# Patient Record
Sex: MALE | Race: OTHER | HISPANIC OR LATINO | URBAN - METROPOLITAN AREA
[De-identification: names, ages, dates, MRNs, and addresses within clinical notes are randomized per-mention and may not be internally consistent; named-entity substitution may affect disease eponyms.]

---

## 2019-01-01 ENCOUNTER — INPATIENT (INPATIENT)
Facility: HOSPITAL | Age: 69
LOS: 13 days | DRG: 870 | End: 2019-06-27
Attending: INTERNAL MEDICINE | Admitting: INTERNAL MEDICINE
Payer: MEDICARE

## 2019-01-01 VITALS
OXYGEN SATURATION: 100 % | DIASTOLIC BLOOD PRESSURE: 60 MMHG | HEART RATE: 72 BPM | TEMPERATURE: 98 F | SYSTOLIC BLOOD PRESSURE: 90 MMHG | RESPIRATION RATE: 22 BRPM | WEIGHT: 220.02 LBS

## 2019-01-01 VITALS — OXYGEN SATURATION: 73 %

## 2019-01-01 DIAGNOSIS — J91.8 PLEURAL EFFUSION IN OTHER CONDITIONS CLASSIFIED ELSEWHERE: ICD-10-CM

## 2019-01-01 DIAGNOSIS — Z78.1 PHYSICAL RESTRAINT STATUS: ICD-10-CM

## 2019-01-01 DIAGNOSIS — E66.01 MORBID (SEVERE) OBESITY DUE TO EXCESS CALORIES: ICD-10-CM

## 2019-01-01 DIAGNOSIS — N12 TUBULO-INTERSTITIAL NEPHRITIS, NOT SPECIFIED AS ACUTE OR CHRONIC: ICD-10-CM

## 2019-01-01 DIAGNOSIS — E87.1 HYPO-OSMOLALITY AND HYPONATREMIA: ICD-10-CM

## 2019-01-01 DIAGNOSIS — Z51.5 ENCOUNTER FOR PALLIATIVE CARE: ICD-10-CM

## 2019-01-01 DIAGNOSIS — Z93.6 OTHER ARTIFICIAL OPENINGS OF URINARY TRACT STATUS: ICD-10-CM

## 2019-01-01 DIAGNOSIS — I12.9 HYPERTENSIVE CHRONIC KIDNEY DISEASE WITH STAGE 1 THROUGH STAGE 4 CHRONIC KIDNEY DISEASE, OR UNSPECIFIED CHRONIC KIDNEY DISEASE: ICD-10-CM

## 2019-01-01 DIAGNOSIS — C34.31 MALIGNANT NEOPLASM OF LOWER LOBE, RIGHT BRONCHUS OR LUNG: ICD-10-CM

## 2019-01-01 DIAGNOSIS — J18.9 PNEUMONIA, UNSPECIFIED ORGANISM: ICD-10-CM

## 2019-01-01 DIAGNOSIS — N13.6 PYONEPHROSIS: ICD-10-CM

## 2019-01-01 DIAGNOSIS — F10.11 ALCOHOL ABUSE, IN REMISSION: ICD-10-CM

## 2019-01-01 DIAGNOSIS — C79.70 SECONDARY MALIGNANT NEOPLASM OF UNSPECIFIED ADRENAL GLAND: ICD-10-CM

## 2019-01-01 DIAGNOSIS — Z85.46 PERSONAL HISTORY OF MALIGNANT NEOPLASM OF PROSTATE: ICD-10-CM

## 2019-01-01 DIAGNOSIS — G89.29 OTHER CHRONIC PAIN: ICD-10-CM

## 2019-01-01 DIAGNOSIS — N19 UNSPECIFIED KIDNEY FAILURE: ICD-10-CM

## 2019-01-01 DIAGNOSIS — N18.4 CHRONIC KIDNEY DISEASE, STAGE 4 (SEVERE): ICD-10-CM

## 2019-01-01 DIAGNOSIS — I95.9 HYPOTENSION, UNSPECIFIED: ICD-10-CM

## 2019-01-01 DIAGNOSIS — R65.21 SEVERE SEPSIS WITH SEPTIC SHOCK: ICD-10-CM

## 2019-01-01 DIAGNOSIS — Z87.828 PERSONAL HISTORY OF OTHER (HEALED) PHYSICAL INJURY AND TRAUMA: ICD-10-CM

## 2019-01-01 DIAGNOSIS — N48.29 OTHER INFLAMMATORY DISORDERS OF PENIS: ICD-10-CM

## 2019-01-01 DIAGNOSIS — J43.9 EMPHYSEMA, UNSPECIFIED: ICD-10-CM

## 2019-01-01 DIAGNOSIS — N26.9 RENAL SCLEROSIS, UNSPECIFIED: ICD-10-CM

## 2019-01-01 DIAGNOSIS — G92 TOXIC ENCEPHALOPATHY: ICD-10-CM

## 2019-01-01 DIAGNOSIS — G47.33 OBSTRUCTIVE SLEEP APNEA (ADULT) (PEDIATRIC): ICD-10-CM

## 2019-01-01 DIAGNOSIS — L03.311 CELLULITIS OF ABDOMINAL WALL: ICD-10-CM

## 2019-01-01 DIAGNOSIS — E86.0 DEHYDRATION: ICD-10-CM

## 2019-01-01 DIAGNOSIS — C78.7 SECONDARY MALIGNANT NEOPLASM OF LIVER AND INTRAHEPATIC BILE DUCT: ICD-10-CM

## 2019-01-01 DIAGNOSIS — K80.10 CALCULUS OF GALLBLADDER WITH CHRONIC CHOLECYSTITIS WITHOUT OBSTRUCTION: ICD-10-CM

## 2019-01-01 DIAGNOSIS — N25.0 RENAL OSTEODYSTROPHY: ICD-10-CM

## 2019-01-01 DIAGNOSIS — Z71.89 OTHER SPECIFIED COUNSELING: ICD-10-CM

## 2019-01-01 DIAGNOSIS — E11.22 TYPE 2 DIABETES MELLITUS WITH DIABETIC CHRONIC KIDNEY DISEASE: ICD-10-CM

## 2019-01-01 DIAGNOSIS — Z90.6 ACQUIRED ABSENCE OF OTHER PARTS OF URINARY TRACT: ICD-10-CM

## 2019-01-01 DIAGNOSIS — E87.0 HYPEROSMOLALITY AND HYPERNATREMIA: ICD-10-CM

## 2019-01-01 DIAGNOSIS — N17.0 ACUTE KIDNEY FAILURE WITH TUBULAR NECROSIS: ICD-10-CM

## 2019-01-01 DIAGNOSIS — E87.5 HYPERKALEMIA: ICD-10-CM

## 2019-01-01 DIAGNOSIS — Z90.5 ACQUIRED ABSENCE OF KIDNEY: ICD-10-CM

## 2019-01-01 DIAGNOSIS — E87.4 MIXED DISORDER OF ACID-BASE BALANCE: ICD-10-CM

## 2019-01-01 DIAGNOSIS — B95.2 ENTEROCOCCUS AS THE CAUSE OF DISEASES CLASSIFIED ELSEWHERE: ICD-10-CM

## 2019-01-01 DIAGNOSIS — E11.65 TYPE 2 DIABETES MELLITUS WITH HYPERGLYCEMIA: ICD-10-CM

## 2019-01-01 DIAGNOSIS — K59.00 CONSTIPATION, UNSPECIFIED: ICD-10-CM

## 2019-01-01 DIAGNOSIS — Z93.3 COLOSTOMY STATUS: ICD-10-CM

## 2019-01-01 DIAGNOSIS — D63.1 ANEMIA IN CHRONIC KIDNEY DISEASE: ICD-10-CM

## 2019-01-01 DIAGNOSIS — F41.9 ANXIETY DISORDER, UNSPECIFIED: ICD-10-CM

## 2019-01-01 DIAGNOSIS — M54.9 DORSALGIA, UNSPECIFIED: ICD-10-CM

## 2019-01-01 DIAGNOSIS — K43.5 PARASTOMAL HERNIA WITHOUT OBSTRUCTION OR GANGRENE: ICD-10-CM

## 2019-01-01 DIAGNOSIS — Z16.11 RESISTANCE TO PENICILLINS: ICD-10-CM

## 2019-01-01 DIAGNOSIS — A41.9 SEPSIS, UNSPECIFIED ORGANISM: ICD-10-CM

## 2019-01-01 DIAGNOSIS — J96.01 ACUTE RESPIRATORY FAILURE WITH HYPOXIA: ICD-10-CM

## 2019-01-01 DIAGNOSIS — Z93.3 COLOSTOMY STATUS: Chronic | ICD-10-CM

## 2019-01-01 DIAGNOSIS — R41.3 OTHER AMNESIA: ICD-10-CM

## 2019-01-01 DIAGNOSIS — Z90.6 ACQUIRED ABSENCE OF OTHER PARTS OF URINARY TRACT: Chronic | ICD-10-CM

## 2019-01-01 DIAGNOSIS — Z66 DO NOT RESUSCITATE: ICD-10-CM

## 2019-01-01 DIAGNOSIS — F17.210 NICOTINE DEPENDENCE, CIGARETTES, UNCOMPLICATED: ICD-10-CM

## 2019-01-01 DIAGNOSIS — T17.990A OTHER FOREIGN OBJECT IN RESPIRATORY TRACT, PART UNSPECIFIED IN CAUSING ASPHYXIATION, INITIAL ENCOUNTER: ICD-10-CM

## 2019-01-01 DIAGNOSIS — I71.9 AORTIC ANEURYSM OF UNSPECIFIED SITE, WITHOUT RUPTURE: ICD-10-CM

## 2019-01-01 DIAGNOSIS — F11.90 OPIOID USE, UNSPECIFIED, UNCOMPLICATED: ICD-10-CM
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
## 2019-01-01 DIAGNOSIS — Z79.84 LONG TERM (CURRENT) USE OF ORAL HYPOGLYCEMIC DRUGS: ICD-10-CM

## 2019-01-01 DIAGNOSIS — E87.70 FLUID OVERLOAD, UNSPECIFIED: ICD-10-CM

## 2019-01-01 DIAGNOSIS — I48.91 UNSPECIFIED ATRIAL FIBRILLATION: ICD-10-CM

## 2019-01-01 LAB
-  AMPICILLIN: SIGNIFICANT CHANGE UP
-  VANCOMYCIN: SIGNIFICANT CHANGE UP
24R-OH-CALCIDIOL SERPL-MCNC: 25.7 NG/ML — LOW (ref 30–80)
ALBUMIN SERPL ELPH-MCNC: 2 G/DL — LOW (ref 3.3–5)
ALBUMIN SERPL ELPH-MCNC: 2.1 G/DL — LOW (ref 3.3–5)
ALBUMIN SERPL ELPH-MCNC: 2.2 G/DL — LOW (ref 3.3–5)
ALBUMIN SERPL ELPH-MCNC: 2.2 G/DL — LOW (ref 3.3–5)
ALBUMIN SERPL ELPH-MCNC: 2.4 G/DL — LOW (ref 3.4–5)
ALBUMIN SERPL ELPH-MCNC: 2.5 G/DL — LOW (ref 3.3–5)
ALBUMIN SERPL ELPH-MCNC: 2.5 G/DL — LOW (ref 3.4–5)
ALBUMIN SERPL ELPH-MCNC: 2.8 G/DL — LOW (ref 3.3–5)
ALBUMIN SERPL ELPH-MCNC: 2.8 G/DL — LOW (ref 3.3–5)
ALBUMIN SERPL ELPH-MCNC: 3 G/DL — LOW (ref 3.3–5)
ALP SERPL-CCNC: 113 U/L — SIGNIFICANT CHANGE UP (ref 40–120)
ALP SERPL-CCNC: 115 U/L — SIGNIFICANT CHANGE UP (ref 40–120)
ALP SERPL-CCNC: 115 U/L — SIGNIFICANT CHANGE UP (ref 40–120)
ALP SERPL-CCNC: 118 U/L — SIGNIFICANT CHANGE UP (ref 40–120)
ALP SERPL-CCNC: 133 U/L — HIGH (ref 40–120)
ALP SERPL-CCNC: 137 U/L — HIGH (ref 40–120)
ALP SERPL-CCNC: 138 U/L — HIGH (ref 40–120)
ALP SERPL-CCNC: 141 U/L — HIGH (ref 40–120)
ALP SERPL-CCNC: 144 U/L — HIGH (ref 40–120)
ALP SERPL-CCNC: 148 U/L — HIGH (ref 40–120)
ALP SERPL-CCNC: 150 U/L — HIGH (ref 40–120)
ALP SERPL-CCNC: 91 U/L — SIGNIFICANT CHANGE UP (ref 40–120)
ALT FLD-CCNC: 11 U/L — SIGNIFICANT CHANGE UP (ref 10–45)
ALT FLD-CCNC: 12 U/L — SIGNIFICANT CHANGE UP (ref 12–42)
ALT FLD-CCNC: 13 U/L — SIGNIFICANT CHANGE UP (ref 10–45)
ALT FLD-CCNC: 14 U/L — SIGNIFICANT CHANGE UP (ref 10–45)
ALT FLD-CCNC: 14 U/L — SIGNIFICANT CHANGE UP (ref 10–45)
ALT FLD-CCNC: 16 U/L — SIGNIFICANT CHANGE UP (ref 10–45)
ALT FLD-CCNC: 17 U/L — SIGNIFICANT CHANGE UP (ref 10–45)
ALT FLD-CCNC: 6 U/L — LOW (ref 10–45)
ALT FLD-CCNC: 7 U/L — LOW (ref 10–45)
ALT FLD-CCNC: 8 U/L — LOW (ref 10–45)
ALT FLD-CCNC: 8 U/L — LOW (ref 10–45)
ALT FLD-CCNC: 9 U/L — LOW (ref 12–42)
AMIODARONE FLD-MCNC: <0.2 MCG/ML — LOW
AMIODARONE+DESETH SERPL-MCNC: <0.2 MCG/ML — SIGNIFICANT CHANGE UP
AMMONIA BLD-MCNC: 28 UMOL/L — SIGNIFICANT CHANGE UP (ref 11–32)
ANION GAP SERPL CALC-SCNC: 12 MMOL/L — SIGNIFICANT CHANGE UP (ref 5–17)
ANION GAP SERPL CALC-SCNC: 13 MMOL/L — SIGNIFICANT CHANGE UP (ref 5–17)
ANION GAP SERPL CALC-SCNC: 13 MMOL/L — SIGNIFICANT CHANGE UP (ref 5–17)
ANION GAP SERPL CALC-SCNC: 14 MMOL/L — SIGNIFICANT CHANGE UP (ref 5–17)
ANION GAP SERPL CALC-SCNC: 15 MMOL/L — SIGNIFICANT CHANGE UP (ref 5–17)
ANION GAP SERPL CALC-SCNC: 16 MMOL/L — SIGNIFICANT CHANGE UP (ref 5–17)
ANION GAP SERPL CALC-SCNC: 16 MMOL/L — SIGNIFICANT CHANGE UP (ref 5–17)
ANION GAP SERPL CALC-SCNC: 17 MMOL/L — SIGNIFICANT CHANGE UP (ref 5–17)
ANION GAP SERPL CALC-SCNC: 18 MMOL/L — HIGH (ref 5–17)
ANION GAP SERPL CALC-SCNC: 20 MMOL/L — HIGH (ref 5–17)
ANION GAP SERPL CALC-SCNC: 20 MMOL/L — HIGH (ref 9–16)
ANION GAP SERPL CALC-SCNC: 20 MMOL/L — HIGH (ref 9–16)
ANION GAP SERPL CALC-SCNC: 23 MMOL/L — HIGH (ref 5–17)
ANISOCYTOSIS BLD QL: SLIGHT — SIGNIFICANT CHANGE UP
APPEARANCE UR: ABNORMAL
APPEARANCE UR: CLEAR — SIGNIFICANT CHANGE UP
APPEARANCE UR: CLEAR — SIGNIFICANT CHANGE UP
APTT BLD: 29.1 SEC — SIGNIFICANT CHANGE UP (ref 27.5–36.3)
APTT BLD: 30 SEC — SIGNIFICANT CHANGE UP (ref 27.5–36.3)
APTT BLD: 31.2 SEC — SIGNIFICANT CHANGE UP (ref 27.5–36.3)
APTT BLD: 35.3 SEC — SIGNIFICANT CHANGE UP (ref 27.5–36.3)
AST SERPL-CCNC: 13 U/L — SIGNIFICANT CHANGE UP (ref 10–40)
AST SERPL-CCNC: 13 U/L — SIGNIFICANT CHANGE UP (ref 10–40)
AST SERPL-CCNC: 14 U/L — LOW (ref 15–37)
AST SERPL-CCNC: 15 U/L — SIGNIFICANT CHANGE UP (ref 10–40)
AST SERPL-CCNC: 16 U/L — SIGNIFICANT CHANGE UP (ref 15–37)
AST SERPL-CCNC: 24 U/L — SIGNIFICANT CHANGE UP (ref 10–40)
AST SERPL-CCNC: 33 U/L — SIGNIFICANT CHANGE UP (ref 10–40)
AST SERPL-CCNC: 35 U/L — SIGNIFICANT CHANGE UP (ref 10–40)
AST SERPL-CCNC: 35 U/L — SIGNIFICANT CHANGE UP (ref 10–40)
AST SERPL-CCNC: 40 U/L — SIGNIFICANT CHANGE UP (ref 10–40)
AST SERPL-CCNC: 43 U/L — HIGH (ref 10–40)
AST SERPL-CCNC: 9 U/L — LOW (ref 10–40)
B PERT IGG+IGM PNL SER: SIGNIFICANT CHANGE UP
BASE EXCESS BLDA CALC-SCNC: -10.5 MMOL/L — LOW (ref -2–3)
BASE EXCESS BLDA CALC-SCNC: -9.2 MMOL/L — LOW (ref -2–3)
BASE EXCESS BLDA CALC-SCNC: -9.9 MMOL/L — LOW (ref -2–3)
BASOPHILS # BLD AUTO: 0 K/UL — SIGNIFICANT CHANGE UP (ref 0–0.2)
BASOPHILS # BLD AUTO: 0.3 K/UL — HIGH (ref 0–0.2)
BASOPHILS NFR BLD AUTO: 0 % — SIGNIFICANT CHANGE UP (ref 0–2)
BASOPHILS NFR BLD AUTO: 0.1 % — SIGNIFICANT CHANGE UP (ref 0–2)
BASOPHILS NFR BLD AUTO: 1 % — SIGNIFICANT CHANGE UP (ref 0–2)
BILIRUB DIRECT SERPL-MCNC: <0.2 MG/DL — SIGNIFICANT CHANGE UP (ref 0–0.2)
BILIRUB INDIRECT FLD-MCNC: >0.1 MG/DL — LOW (ref 0.2–1)
BILIRUB INDIRECT FLD-MCNC: >0.1 MG/DL — LOW (ref 0.2–1)
BILIRUB INDIRECT FLD-MCNC: >0.2 MG/DL — SIGNIFICANT CHANGE UP (ref 0.2–1)
BILIRUB SERPL-MCNC: 0.3 MG/DL — SIGNIFICANT CHANGE UP (ref 0.2–1.2)
BILIRUB SERPL-MCNC: 0.4 MG/DL — SIGNIFICANT CHANGE UP (ref 0.2–1.2)
BILIRUB UR-MCNC: ABNORMAL
BILIRUB UR-MCNC: NEGATIVE — SIGNIFICANT CHANGE UP
BILIRUB UR-MCNC: NEGATIVE — SIGNIFICANT CHANGE UP
BLD GP AB SCN SERPL QL: NEGATIVE — SIGNIFICANT CHANGE UP
BUN SERPL-MCNC: 38 MG/DL — HIGH (ref 7–23)
BUN SERPL-MCNC: 39 MG/DL — HIGH (ref 7–23)
BUN SERPL-MCNC: 40 MG/DL — HIGH (ref 7–23)
BUN SERPL-MCNC: 41 MG/DL — HIGH (ref 7–23)
BUN SERPL-MCNC: 42 MG/DL — HIGH (ref 7–23)
BUN SERPL-MCNC: 43 MG/DL — HIGH (ref 7–23)
BUN SERPL-MCNC: 48 MG/DL — HIGH (ref 7–23)
BUN SERPL-MCNC: 49 MG/DL — HIGH (ref 7–23)
BUN SERPL-MCNC: 50 MG/DL — HIGH (ref 7–23)
BUN SERPL-MCNC: 53 MG/DL — HIGH (ref 7–23)
BUN SERPL-MCNC: 57 MG/DL — HIGH (ref 7–23)
BUN SERPL-MCNC: 58 MG/DL — HIGH (ref 7–23)
BUN SERPL-MCNC: 58 MG/DL — HIGH (ref 7–23)
BUN SERPL-MCNC: 59 MG/DL — HIGH (ref 7–23)
BUN SERPL-MCNC: 60 MG/DL — HIGH (ref 7–23)
BUN SERPL-MCNC: 62 MG/DL — HIGH (ref 7–23)
BUN SERPL-MCNC: 73 MG/DL — HIGH (ref 7–23)
BUN SERPL-MCNC: 74 MG/DL — HIGH (ref 7–23)
BURR CELLS BLD QL SMEAR: PRESENT — SIGNIFICANT CHANGE UP
CALCIUM SERPL-MCNC: 7.4 MG/DL — LOW (ref 8.4–10.5)
CALCIUM SERPL-MCNC: 7.7 MG/DL — LOW (ref 8.4–10.5)
CALCIUM SERPL-MCNC: 8.1 MG/DL — LOW (ref 8.4–10.5)
CALCIUM SERPL-MCNC: 8.2 MG/DL — LOW (ref 8.4–10.5)
CALCIUM SERPL-MCNC: 8.2 MG/DL — LOW (ref 8.4–10.5)
CALCIUM SERPL-MCNC: 8.3 MG/DL — LOW (ref 8.4–10.5)
CALCIUM SERPL-MCNC: 8.4 MG/DL — SIGNIFICANT CHANGE UP (ref 8.4–10.5)
CALCIUM SERPL-MCNC: 8.6 MG/DL — SIGNIFICANT CHANGE UP (ref 8.4–10.5)
CALCIUM SERPL-MCNC: 8.6 MG/DL — SIGNIFICANT CHANGE UP (ref 8.4–10.5)
CALCIUM SERPL-MCNC: 8.7 MG/DL — SIGNIFICANT CHANGE UP (ref 8.4–10.5)
CALCIUM SERPL-MCNC: 8.7 MG/DL — SIGNIFICANT CHANGE UP (ref 8.4–10.5)
CALCIUM SERPL-MCNC: 8.7 MG/DL — SIGNIFICANT CHANGE UP (ref 8.5–10.5)
CALCIUM SERPL-MCNC: 8.8 MG/DL — SIGNIFICANT CHANGE UP (ref 8.4–10.5)
CALCIUM SERPL-MCNC: 8.9 MG/DL — SIGNIFICANT CHANGE UP (ref 8.4–10.5)
CALCIUM SERPL-MCNC: 8.9 MG/DL — SIGNIFICANT CHANGE UP (ref 8.4–10.5)
CALCIUM SERPL-MCNC: 9.1 MG/DL — SIGNIFICANT CHANGE UP (ref 8.5–10.5)
CHLORIDE SERPL-SCNC: 100 MMOL/L — SIGNIFICANT CHANGE UP (ref 96–108)
CHLORIDE SERPL-SCNC: 101 MMOL/L — SIGNIFICANT CHANGE UP (ref 96–108)
CHLORIDE SERPL-SCNC: 101 MMOL/L — SIGNIFICANT CHANGE UP (ref 96–108)
CHLORIDE SERPL-SCNC: 102 MMOL/L — SIGNIFICANT CHANGE UP (ref 96–108)
CHLORIDE SERPL-SCNC: 104 MMOL/L — SIGNIFICANT CHANGE UP (ref 96–108)
CHLORIDE SERPL-SCNC: 105 MMOL/L — SIGNIFICANT CHANGE UP (ref 96–108)
CHLORIDE SERPL-SCNC: 106 MMOL/L — SIGNIFICANT CHANGE UP (ref 96–108)
CHLORIDE SERPL-SCNC: 106 MMOL/L — SIGNIFICANT CHANGE UP (ref 96–108)
CHLORIDE SERPL-SCNC: 109 MMOL/L — HIGH (ref 96–108)
CHLORIDE SERPL-SCNC: 109 MMOL/L — HIGH (ref 96–108)
CHLORIDE SERPL-SCNC: 110 MMOL/L — HIGH (ref 96–108)
CHLORIDE SERPL-SCNC: 110 MMOL/L — HIGH (ref 96–108)
CHLORIDE SERPL-SCNC: 112 MMOL/L — HIGH (ref 96–108)
CHLORIDE SERPL-SCNC: 113 MMOL/L — HIGH (ref 96–108)
CHLORIDE SERPL-SCNC: 115 MMOL/L — HIGH (ref 96–108)
CHLORIDE SERPL-SCNC: 99 MMOL/L — SIGNIFICANT CHANGE UP (ref 96–108)
CK MB BLD-MCNC: 1.82 % — SIGNIFICANT CHANGE UP
CK MB CFR SERPL CALC: 0.8 NG/ML — SIGNIFICANT CHANGE UP (ref 0.5–3.6)
CK SERPL-CCNC: 34 U/L — LOW (ref 39–308)
CO2 SERPL-SCNC: 11 MMOL/L — LOW (ref 22–31)
CO2 SERPL-SCNC: 12 MMOL/L — LOW (ref 22–31)
CO2 SERPL-SCNC: 15 MMOL/L — LOW (ref 22–31)
CO2 SERPL-SCNC: 16 MMOL/L — LOW (ref 22–31)
CO2 SERPL-SCNC: 16 MMOL/L — LOW (ref 22–31)
CO2 SERPL-SCNC: 17 MMOL/L — LOW (ref 22–31)
CO2 SERPL-SCNC: 18 MMOL/L — LOW (ref 22–31)
CO2 SERPL-SCNC: 19 MMOL/L — LOW (ref 22–31)
CO2 SERPL-SCNC: 20 MMOL/L — LOW (ref 22–31)
CO2 SERPL-SCNC: 20 MMOL/L — LOW (ref 22–31)
CO2 SERPL-SCNC: 21 MMOL/L — LOW (ref 22–31)
COHGB MFR BLDV: 2.6 % — HIGH
COLOR FLD: SIGNIFICANT CHANGE UP
COLOR SPEC: YELLOW — SIGNIFICANT CHANGE UP
CREAT ?TM UR-MCNC: 144 MG/DL — SIGNIFICANT CHANGE UP
CREAT ?TM UR-MCNC: 88 MG/DL — SIGNIFICANT CHANGE UP
CREAT SERPL-MCNC: 2.23 MG/DL — HIGH (ref 0.5–1.3)
CREAT SERPL-MCNC: 2.28 MG/DL — HIGH (ref 0.5–1.3)
CREAT SERPL-MCNC: 2.29 MG/DL — HIGH (ref 0.5–1.3)
CREAT SERPL-MCNC: 2.31 MG/DL — HIGH (ref 0.5–1.3)
CREAT SERPL-MCNC: 2.35 MG/DL — HIGH (ref 0.5–1.3)
CREAT SERPL-MCNC: 2.44 MG/DL — HIGH (ref 0.5–1.3)
CREAT SERPL-MCNC: 2.62 MG/DL — HIGH (ref 0.5–1.3)
CREAT SERPL-MCNC: 2.7 MG/DL — HIGH (ref 0.5–1.3)
CREAT SERPL-MCNC: 2.71 MG/DL — HIGH (ref 0.5–1.3)
CREAT SERPL-MCNC: 2.85 MG/DL — HIGH (ref 0.5–1.3)
CREAT SERPL-MCNC: 2.89 MG/DL — HIGH (ref 0.5–1.3)
CREAT SERPL-MCNC: 2.9 MG/DL — HIGH (ref 0.5–1.3)
CREAT SERPL-MCNC: 2.95 MG/DL — HIGH (ref 0.5–1.3)
CREAT SERPL-MCNC: 2.96 MG/DL — HIGH (ref 0.5–1.3)
CREAT SERPL-MCNC: 2.96 MG/DL — HIGH (ref 0.5–1.3)
CREAT SERPL-MCNC: 3.01 MG/DL — HIGH (ref 0.5–1.3)
CREAT SERPL-MCNC: 3.08 MG/DL — HIGH (ref 0.5–1.3)
CREAT SERPL-MCNC: 3.2 MG/DL — HIGH (ref 0.5–1.3)
CREAT SERPL-MCNC: 3.5 MG/DL — HIGH (ref 0.5–1.3)
CREAT SERPL-MCNC: 3.75 MG/DL — HIGH (ref 0.5–1.3)
CREAT SERPL-MCNC: 3.88 MG/DL — HIGH (ref 0.5–1.3)
CREAT SERPL-MCNC: 4.47 MG/DL — HIGH (ref 0.5–1.3)
CREAT SERPL-MCNC: 4.72 MG/DL — HIGH (ref 0.5–1.3)
CULTURE RESULTS: NO GROWTH — SIGNIFICANT CHANGE UP
CULTURE RESULTS: NO GROWTH — SIGNIFICANT CHANGE UP
CULTURE RESULTS: SIGNIFICANT CHANGE UP
DIFF PNL FLD: ABNORMAL
EOSINOPHIL # BLD AUTO: 1.83 K/UL — HIGH (ref 0–0.5)
EOSINOPHIL # BLD AUTO: 2.7 K/UL — HIGH (ref 0–0.5)
EOSINOPHIL # BLD AUTO: 2.88 K/UL — HIGH (ref 0–0.5)
EOSINOPHIL # BLD AUTO: 3.02 K/UL — HIGH (ref 0–0.5)
EOSINOPHIL # BLD AUTO: 3.34 K/UL — HIGH (ref 0–0.5)
EOSINOPHIL # BLD AUTO: 5.25 K/UL — HIGH (ref 0–0.5)
EOSINOPHIL NFR BLD AUTO: 10 % — HIGH (ref 0–6)
EOSINOPHIL NFR BLD AUTO: 10 % — HIGH (ref 0–6)
EOSINOPHIL NFR BLD AUTO: 10.6 % — HIGH (ref 0–6)
EOSINOPHIL NFR BLD AUTO: 10.6 % — HIGH (ref 0–6)
EOSINOPHIL NFR BLD AUTO: 12.5 % — HIGH (ref 0–6)
EOSINOPHIL NFR BLD AUTO: 7 % — HIGH (ref 0–6)
EOSINOPHIL NFR BLD AUTO: 7.9 % — HIGH (ref 0–6)
FLUID INTAKE SUBSTANCE CLASS: SIGNIFICANT CHANGE UP
FLUID SEGMENTED GRANULOCYTES: 81 % — SIGNIFICANT CHANGE UP
GAS PNL BLDA: SIGNIFICANT CHANGE UP
GIANT PLATELETS BLD QL SMEAR: PRESENT — SIGNIFICANT CHANGE UP
GLUCOSE BLDC GLUCOMTR-MCNC: 102 MG/DL — HIGH (ref 70–99)
GLUCOSE BLDC GLUCOMTR-MCNC: 103 MG/DL — HIGH (ref 70–99)
GLUCOSE BLDC GLUCOMTR-MCNC: 107 MG/DL — HIGH (ref 70–99)
GLUCOSE BLDC GLUCOMTR-MCNC: 111 MG/DL — HIGH (ref 70–99)
GLUCOSE BLDC GLUCOMTR-MCNC: 118 MG/DL — HIGH (ref 70–99)
GLUCOSE BLDC GLUCOMTR-MCNC: 118 MG/DL — HIGH (ref 70–99)
GLUCOSE BLDC GLUCOMTR-MCNC: 124 MG/DL — HIGH (ref 70–99)
GLUCOSE BLDC GLUCOMTR-MCNC: 124 MG/DL — HIGH (ref 70–99)
GLUCOSE BLDC GLUCOMTR-MCNC: 129 MG/DL — HIGH (ref 70–99)
GLUCOSE BLDC GLUCOMTR-MCNC: 131 MG/DL — HIGH (ref 70–99)
GLUCOSE BLDC GLUCOMTR-MCNC: 132 MG/DL — HIGH (ref 70–99)
GLUCOSE BLDC GLUCOMTR-MCNC: 132 MG/DL — HIGH (ref 70–99)
GLUCOSE BLDC GLUCOMTR-MCNC: 134 MG/DL — HIGH (ref 70–99)
GLUCOSE BLDC GLUCOMTR-MCNC: 135 MG/DL — HIGH (ref 70–99)
GLUCOSE BLDC GLUCOMTR-MCNC: 138 MG/DL — HIGH (ref 70–99)
GLUCOSE BLDC GLUCOMTR-MCNC: 139 MG/DL — HIGH (ref 70–99)
GLUCOSE BLDC GLUCOMTR-MCNC: 142 MG/DL — HIGH (ref 70–99)
GLUCOSE BLDC GLUCOMTR-MCNC: 143 MG/DL — HIGH (ref 70–99)
GLUCOSE BLDC GLUCOMTR-MCNC: 145 MG/DL — HIGH (ref 70–99)
GLUCOSE BLDC GLUCOMTR-MCNC: 147 MG/DL — HIGH (ref 70–99)
GLUCOSE BLDC GLUCOMTR-MCNC: 147 MG/DL — HIGH (ref 70–99)
GLUCOSE BLDC GLUCOMTR-MCNC: 149 MG/DL — HIGH (ref 70–99)
GLUCOSE BLDC GLUCOMTR-MCNC: 151 MG/DL — HIGH (ref 70–99)
GLUCOSE BLDC GLUCOMTR-MCNC: 155 MG/DL — HIGH (ref 70–99)
GLUCOSE BLDC GLUCOMTR-MCNC: 156 MG/DL — HIGH (ref 70–99)
GLUCOSE BLDC GLUCOMTR-MCNC: 157 MG/DL — HIGH (ref 70–99)
GLUCOSE BLDC GLUCOMTR-MCNC: 158 MG/DL — HIGH (ref 70–99)
GLUCOSE BLDC GLUCOMTR-MCNC: 159 MG/DL — HIGH (ref 70–99)
GLUCOSE BLDC GLUCOMTR-MCNC: 160 MG/DL — HIGH (ref 70–99)
GLUCOSE BLDC GLUCOMTR-MCNC: 160 MG/DL — HIGH (ref 70–99)
GLUCOSE BLDC GLUCOMTR-MCNC: 165 MG/DL — HIGH (ref 70–99)
GLUCOSE BLDC GLUCOMTR-MCNC: 165 MG/DL — HIGH (ref 70–99)
GLUCOSE BLDC GLUCOMTR-MCNC: 166 MG/DL — HIGH (ref 70–99)
GLUCOSE BLDC GLUCOMTR-MCNC: 168 MG/DL — HIGH (ref 70–99)
GLUCOSE BLDC GLUCOMTR-MCNC: 169 MG/DL — HIGH (ref 70–99)
GLUCOSE BLDC GLUCOMTR-MCNC: 170 MG/DL — HIGH (ref 70–99)
GLUCOSE BLDC GLUCOMTR-MCNC: 171 MG/DL — HIGH (ref 70–99)
GLUCOSE BLDC GLUCOMTR-MCNC: 175 MG/DL — HIGH (ref 70–99)
GLUCOSE BLDC GLUCOMTR-MCNC: 177 MG/DL — HIGH (ref 70–99)
GLUCOSE BLDC GLUCOMTR-MCNC: 186 MG/DL — HIGH (ref 70–99)
GLUCOSE BLDC GLUCOMTR-MCNC: 187 MG/DL — HIGH (ref 70–99)
GLUCOSE BLDC GLUCOMTR-MCNC: 188 MG/DL — HIGH (ref 70–99)
GLUCOSE BLDC GLUCOMTR-MCNC: 193 MG/DL — HIGH (ref 70–99)
GLUCOSE BLDC GLUCOMTR-MCNC: 195 MG/DL — HIGH (ref 70–99)
GLUCOSE BLDC GLUCOMTR-MCNC: 197 MG/DL — HIGH (ref 70–99)
GLUCOSE BLDC GLUCOMTR-MCNC: 206 MG/DL — HIGH (ref 70–99)
GLUCOSE BLDC GLUCOMTR-MCNC: 211 MG/DL — HIGH (ref 70–99)
GLUCOSE BLDC GLUCOMTR-MCNC: 225 MG/DL — HIGH (ref 70–99)
GLUCOSE SERPL-MCNC: 111 MG/DL — HIGH (ref 70–99)
GLUCOSE SERPL-MCNC: 115 MG/DL — HIGH (ref 70–99)
GLUCOSE SERPL-MCNC: 125 MG/DL — HIGH (ref 70–99)
GLUCOSE SERPL-MCNC: 134 MG/DL — HIGH (ref 70–99)
GLUCOSE SERPL-MCNC: 137 MG/DL — HIGH (ref 70–99)
GLUCOSE SERPL-MCNC: 141 MG/DL — HIGH (ref 70–99)
GLUCOSE SERPL-MCNC: 142 MG/DL — HIGH (ref 70–99)
GLUCOSE SERPL-MCNC: 142 MG/DL — HIGH (ref 70–99)
GLUCOSE SERPL-MCNC: 154 MG/DL — HIGH (ref 70–99)
GLUCOSE SERPL-MCNC: 154 MG/DL — HIGH (ref 70–99)
GLUCOSE SERPL-MCNC: 155 MG/DL — HIGH (ref 70–99)
GLUCOSE SERPL-MCNC: 155 MG/DL — HIGH (ref 70–99)
GLUCOSE SERPL-MCNC: 158 MG/DL — HIGH (ref 70–99)
GLUCOSE SERPL-MCNC: 160 MG/DL — HIGH (ref 70–99)
GLUCOSE SERPL-MCNC: 161 MG/DL — HIGH (ref 70–99)
GLUCOSE SERPL-MCNC: 163 MG/DL — HIGH (ref 70–99)
GLUCOSE SERPL-MCNC: 165 MG/DL — HIGH (ref 70–99)
GLUCOSE SERPL-MCNC: 170 MG/DL — HIGH (ref 70–99)
GLUCOSE SERPL-MCNC: 181 MG/DL — HIGH (ref 70–99)
GLUCOSE SERPL-MCNC: 185 MG/DL — HIGH (ref 70–99)
GLUCOSE SERPL-MCNC: 191 MG/DL — HIGH (ref 70–99)
GLUCOSE SERPL-MCNC: 209 MG/DL — HIGH (ref 70–99)
GLUCOSE SERPL-MCNC: 249 MG/DL — HIGH (ref 70–99)
GLUCOSE UR QL: NEGATIVE — SIGNIFICANT CHANGE UP
GRAM STN FLD: SIGNIFICANT CHANGE UP
HBA1C BLD-MCNC: 7.4 % — HIGH (ref 4–5.6)
HCO3 BLDA-SCNC: 15 MMOL/L — LOW (ref 21–28)
HCO3 BLDA-SCNC: 17 MMOL/L — LOW (ref 21–28)
HCO3 BLDA-SCNC: 18 MMOL/L — LOW (ref 21–28)
HCT VFR BLD CALC: 30.9 % — LOW (ref 39–50)
HCT VFR BLD CALC: 31 % — LOW (ref 39–50)
HCT VFR BLD CALC: 32.5 % — LOW (ref 39–50)
HCT VFR BLD CALC: 33 % — LOW (ref 39–50)
HCT VFR BLD CALC: 33 % — LOW (ref 39–50)
HCT VFR BLD CALC: 33.2 % — LOW (ref 39–50)
HCT VFR BLD CALC: 33.5 % — LOW (ref 39–50)
HCT VFR BLD CALC: 33.6 % — LOW (ref 39–50)
HCT VFR BLD CALC: 33.7 % — LOW (ref 39–50)
HCT VFR BLD CALC: 34 % — LOW (ref 39–50)
HCT VFR BLD CALC: 34.1 % — LOW (ref 39–50)
HCT VFR BLD CALC: 35.1 % — LOW (ref 39–50)
HCT VFR BLD CALC: 36.5 % — LOW (ref 39–50)
HCT VFR BLD CALC: 36.9 % — LOW (ref 39–50)
HCT VFR BLD CALC: 39.5 % — SIGNIFICANT CHANGE UP (ref 39–50)
HCT VFR BLD CALC: 40.8 % — SIGNIFICANT CHANGE UP (ref 39–50)
HGB BLD-MCNC: 10.2 G/DL — LOW (ref 13–17)
HGB BLD-MCNC: 10.7 G/DL — LOW (ref 13–17)
HGB BLD-MCNC: 10.9 G/DL — LOW (ref 13–17)
HGB BLD-MCNC: 11.3 G/DL — LOW (ref 13–17)
HGB BLD-MCNC: 11.5 G/DL — LOW (ref 13–17)
HGB BLD-MCNC: 11.6 G/DL — LOW (ref 13–17)
HGB BLD-MCNC: 12.7 G/DL — LOW (ref 13–17)
HGB BLD-MCNC: 13.3 G/DL — SIGNIFICANT CHANGE UP (ref 13–17)
HGB BLD-MCNC: 9.5 G/DL — LOW (ref 13–17)
HGB BLD-MCNC: 9.7 G/DL — LOW (ref 13–17)
HGB BLD-MCNC: 9.8 G/DL — LOW (ref 13–17)
HGB BLD-MCNC: 9.9 G/DL — LOW (ref 13–17)
HGB BLD-MCNC: 9.9 G/DL — LOW (ref 13–17)
HYPOCHROMIA BLD QL: SLIGHT — SIGNIFICANT CHANGE UP
HYPOCHROMIA BLD QL: SLIGHT — SIGNIFICANT CHANGE UP
IMM GRANULOCYTES NFR BLD AUTO: 5 % — HIGH (ref 0–1.5)
INR BLD: 1.19 — HIGH (ref 0.88–1.16)
INR BLD: 1.21 — HIGH (ref 0.88–1.16)
INR BLD: 1.22 — HIGH (ref 0.88–1.16)
INR BLD: 1.25 — HIGH (ref 0.88–1.16)
INR BLD: 1.47 — HIGH (ref 0.88–1.16)
KETONES UR-MCNC: NEGATIVE — SIGNIFICANT CHANGE UP
LACTATE SERPL-SCNC: 1.8 MMOL/L — SIGNIFICANT CHANGE UP (ref 0.5–2)
LACTATE SERPL-SCNC: 2 MMOL/L — SIGNIFICANT CHANGE UP (ref 0.5–2)
LACTATE SERPL-SCNC: 2 MMOL/L — SIGNIFICANT CHANGE UP (ref 0.5–2)
LACTATE SERPL-SCNC: 2.1 MMOL/L — HIGH (ref 0.5–2)
LACTATE SERPL-SCNC: 2.3 MMOL/L — HIGH (ref 0.5–2)
LACTATE SERPL-SCNC: 2.4 MMOL/L — HIGH (ref 0.5–2)
LACTATE SERPL-SCNC: 2.6 MMOL/L — HIGH (ref 0.4–2)
LACTATE SERPL-SCNC: 2.6 MMOL/L — HIGH (ref 0.5–2)
LACTATE SERPL-SCNC: 2.8 MMOL/L — HIGH (ref 0.5–2)
LEUKOCYTE ESTERASE UR-ACNC: ABNORMAL
LG PLATELETS BLD QL AUTO: SLIGHT — SIGNIFICANT CHANGE UP
LIDOCAIN IGE QN: 148 U/L — SIGNIFICANT CHANGE UP (ref 73–393)
LYMPHOCYTES # BLD AUTO: 1.74 K/UL — SIGNIFICANT CHANGE UP (ref 1–3.3)
LYMPHOCYTES # BLD AUTO: 10 % — LOW (ref 13–44)
LYMPHOCYTES # BLD AUTO: 10.1 % — LOW (ref 13–44)
LYMPHOCYTES # BLD AUTO: 10.6 % — LOW (ref 13–44)
LYMPHOCYTES # BLD AUTO: 13 % — SIGNIFICANT CHANGE UP (ref 13–44)
LYMPHOCYTES # BLD AUTO: 15.8 % — SIGNIFICANT CHANGE UP (ref 13–44)
LYMPHOCYTES # BLD AUTO: 2.11 K/UL — SIGNIFICANT CHANGE UP (ref 1–3.3)
LYMPHOCYTES # BLD AUTO: 2.61 K/UL — SIGNIFICANT CHANGE UP (ref 1–3.3)
LYMPHOCYTES # BLD AUTO: 2.88 K/UL — SIGNIFICANT CHANGE UP (ref 1–3.3)
LYMPHOCYTES # BLD AUTO: 3.34 K/UL — HIGH (ref 1–3.3)
LYMPHOCYTES # BLD AUTO: 3.66 K/UL — HIGH (ref 1–3.3)
LYMPHOCYTES # BLD AUTO: 6.2 % — LOW (ref 13–44)
LYMPHOCYTES # BLD AUTO: 7 % — LOW (ref 13–44)
LYMPHOCYTES # SPEC AUTO: 0.9 % — HIGH (ref 0–0)
MACROCYTES BLD QL: SLIGHT — SIGNIFICANT CHANGE UP
MACROCYTES OVAL BLD QL SMEAR: SLIGHT — SIGNIFICANT CHANGE UP
MAGNESIUM SERPL-MCNC: 1.6 MG/DL — SIGNIFICANT CHANGE UP (ref 1.6–2.6)
MAGNESIUM SERPL-MCNC: 1.6 MG/DL — SIGNIFICANT CHANGE UP (ref 1.6–2.6)
MAGNESIUM SERPL-MCNC: 1.7 MG/DL — SIGNIFICANT CHANGE UP (ref 1.6–2.6)
MAGNESIUM SERPL-MCNC: 1.8 MG/DL — SIGNIFICANT CHANGE UP (ref 1.6–2.6)
MAGNESIUM SERPL-MCNC: 1.9 MG/DL — SIGNIFICANT CHANGE UP (ref 1.6–2.6)
MAGNESIUM SERPL-MCNC: 1.9 MG/DL — SIGNIFICANT CHANGE UP (ref 1.6–2.6)
MAGNESIUM SERPL-MCNC: 2 MG/DL — SIGNIFICANT CHANGE UP (ref 1.6–2.6)
MAGNESIUM SERPL-MCNC: 2 MG/DL — SIGNIFICANT CHANGE UP (ref 1.6–2.6)
MAGNESIUM SERPL-MCNC: 2.1 MG/DL — SIGNIFICANT CHANGE UP (ref 1.6–2.6)
MAGNESIUM SERPL-MCNC: 2.1 MG/DL — SIGNIFICANT CHANGE UP (ref 1.6–2.6)
MAGNESIUM SERPL-MCNC: 2.2 MG/DL — SIGNIFICANT CHANGE UP (ref 1.6–2.6)
MAGNESIUM SERPL-MCNC: 2.2 MG/DL — SIGNIFICANT CHANGE UP (ref 1.6–2.6)
MANUAL DIF COMMENT BLD-IMP: SIGNIFICANT CHANGE UP
MANUAL SMEAR VERIFICATION: SIGNIFICANT CHANGE UP
MCHC RBC-ENTMCNC: 25.6 PG — LOW (ref 27–34)
MCHC RBC-ENTMCNC: 26 PG — LOW (ref 27–34)
MCHC RBC-ENTMCNC: 26.1 PG — LOW (ref 27–34)
MCHC RBC-ENTMCNC: 26.2 PG — LOW (ref 27–34)
MCHC RBC-ENTMCNC: 26.3 PG — LOW (ref 27–34)
MCHC RBC-ENTMCNC: 26.4 PG — LOW (ref 27–34)
MCHC RBC-ENTMCNC: 26.5 PG — LOW (ref 27–34)
MCHC RBC-ENTMCNC: 26.6 PG — LOW (ref 27–34)
MCHC RBC-ENTMCNC: 26.6 PG — LOW (ref 27–34)
MCHC RBC-ENTMCNC: 26.8 PG — LOW (ref 27–34)
MCHC RBC-ENTMCNC: 29.8 GM/DL — LOW (ref 32–36)
MCHC RBC-ENTMCNC: 30 GM/DL — LOW (ref 32–36)
MCHC RBC-ENTMCNC: 30 GM/DL — LOW (ref 32–36)
MCHC RBC-ENTMCNC: 30.2 GM/DL — LOW (ref 32–36)
MCHC RBC-ENTMCNC: 30.3 GM/DL — LOW (ref 32–36)
MCHC RBC-ENTMCNC: 30.4 GM/DL — LOW (ref 32–36)
MCHC RBC-ENTMCNC: 30.7 GM/DL — LOW (ref 32–36)
MCHC RBC-ENTMCNC: 30.9 GM/DL — LOW (ref 32–36)
MCHC RBC-ENTMCNC: 31.2 GM/DL — LOW (ref 32–36)
MCHC RBC-ENTMCNC: 31.3 GM/DL — LOW (ref 32–36)
MCHC RBC-ENTMCNC: 31.4 GM/DL — LOW (ref 32–36)
MCHC RBC-ENTMCNC: 31.8 GM/DL — LOW (ref 32–36)
MCHC RBC-ENTMCNC: 32.2 G/DL — SIGNIFICANT CHANGE UP (ref 32–36)
MCHC RBC-ENTMCNC: 32.2 GM/DL — SIGNIFICANT CHANGE UP (ref 32–36)
MCHC RBC-ENTMCNC: 32.5 GM/DL — SIGNIFICANT CHANGE UP (ref 32–36)
MCHC RBC-ENTMCNC: 32.6 G/DL — SIGNIFICANT CHANGE UP (ref 32–36)
MCV RBC AUTO: 81.3 FL — SIGNIFICANT CHANGE UP (ref 80–100)
MCV RBC AUTO: 81.5 FL — SIGNIFICANT CHANGE UP (ref 80–100)
MCV RBC AUTO: 81.8 FL — SIGNIFICANT CHANGE UP (ref 80–100)
MCV RBC AUTO: 82.6 FL — SIGNIFICANT CHANGE UP (ref 80–100)
MCV RBC AUTO: 83.1 FL — SIGNIFICANT CHANGE UP (ref 80–100)
MCV RBC AUTO: 83.4 FL — SIGNIFICANT CHANGE UP (ref 80–100)
MCV RBC AUTO: 83.5 FL — SIGNIFICANT CHANGE UP (ref 80–100)
MCV RBC AUTO: 85.2 FL — SIGNIFICANT CHANGE UP (ref 80–100)
MCV RBC AUTO: 85.4 FL — SIGNIFICANT CHANGE UP (ref 80–100)
MCV RBC AUTO: 85.6 FL — SIGNIFICANT CHANGE UP (ref 80–100)
MCV RBC AUTO: 86.4 FL — SIGNIFICANT CHANGE UP (ref 80–100)
MCV RBC AUTO: 86.8 FL — SIGNIFICANT CHANGE UP (ref 80–100)
MCV RBC AUTO: 86.8 FL — SIGNIFICANT CHANGE UP (ref 80–100)
MCV RBC AUTO: 87.3 FL — SIGNIFICANT CHANGE UP (ref 80–100)
MCV RBC AUTO: 88.1 FL — SIGNIFICANT CHANGE UP (ref 80–100)
MCV RBC AUTO: 88.5 FL — SIGNIFICANT CHANGE UP (ref 80–100)
METAMYELOCYTES # FLD: 1 % — HIGH (ref 0–0)
METAMYELOCYTES # FLD: 1 % — HIGH (ref 0–0)
METAMYELOCYTES # FLD: 2.7 % — HIGH (ref 0–0)
METAMYELOCYTES # FLD: 4.5 % — HIGH (ref 0–0)
METHOD TYPE: SIGNIFICANT CHANGE UP
MICROCYTES BLD QL: SLIGHT — SIGNIFICANT CHANGE UP
MONOCYTES # BLD AUTO: 1 K/UL — HIGH (ref 0–0.9)
MONOCYTES # BLD AUTO: 1.1 K/UL — HIGH (ref 0–0.9)
MONOCYTES # BLD AUTO: 1.15 K/UL — HIGH (ref 0–0.9)
MONOCYTES # BLD AUTO: 1.23 K/UL — HIGH (ref 0–0.9)
MONOCYTES # BLD AUTO: 1.51 K/UL — HIGH (ref 0–0.9)
MONOCYTES # BLD AUTO: 1.89 K/UL — HIGH (ref 0–0.9)
MONOCYTES NFR BLD AUTO: 3.5 % — SIGNIFICANT CHANGE UP (ref 2–14)
MONOCYTES NFR BLD AUTO: 4 % — SIGNIFICANT CHANGE UP (ref 2–14)
MONOCYTES NFR BLD AUTO: 4 % — SIGNIFICANT CHANGE UP (ref 2–14)
MONOCYTES NFR BLD AUTO: 4.5 % — SIGNIFICANT CHANGE UP (ref 2–14)
MONOCYTES NFR BLD AUTO: 5 % — SIGNIFICANT CHANGE UP (ref 2–14)
MONOCYTES NFR BLD AUTO: 5.3 % — SIGNIFICANT CHANGE UP (ref 2–14)
MONOCYTES NFR BLD AUTO: 6.4 % — SIGNIFICANT CHANGE UP (ref 2–14)
MONOS+MACROS # FLD: 19 % — SIGNIFICANT CHANGE UP
MYELOCYTES NFR BLD: 0.9 % — HIGH (ref 0–0)
MYELOCYTES NFR BLD: 3 % — HIGH (ref 0–0)
MYELOCYTES NFR BLD: 6.2 % — HIGH (ref 0–0)
NEUTROPHILS # BLD AUTO: 15.65 K/UL — HIGH (ref 1.8–7.4)
NEUTROPHILS # BLD AUTO: 20.94 K/UL — HIGH (ref 1.8–7.4)
NEUTROPHILS # BLD AUTO: 21.56 K/UL — HIGH (ref 1.8–7.4)
NEUTROPHILS # BLD AUTO: 22.02 K/UL — HIGH (ref 1.8–7.4)
NEUTROPHILS # BLD AUTO: 22.51 K/UL — HIGH (ref 1.8–7.4)
NEUTROPHILS # BLD AUTO: 30.39 K/UL — HIGH (ref 1.8–7.4)
NEUTROPHILS NFR BLD AUTO: 41.2 % — LOW (ref 43–77)
NEUTROPHILS NFR BLD AUTO: 62.5 % — SIGNIFICANT CHANGE UP (ref 43–77)
NEUTROPHILS NFR BLD AUTO: 63 % — SIGNIFICANT CHANGE UP (ref 43–77)
NEUTROPHILS NFR BLD AUTO: 63 % — SIGNIFICANT CHANGE UP (ref 43–77)
NEUTROPHILS NFR BLD AUTO: 64.6 % — SIGNIFICANT CHANGE UP (ref 43–77)
NEUTROPHILS NFR BLD AUTO: 67.8 % — SIGNIFICANT CHANGE UP (ref 43–77)
NEUTROPHILS NFR BLD AUTO: 70 % — SIGNIFICANT CHANGE UP (ref 43–77)
NEUTS BAND # BLD: 1.8 % — SIGNIFICANT CHANGE UP (ref 0–8)
NEUTS BAND # BLD: 10 % — HIGH (ref 0–8)
NEUTS BAND # BLD: 12 % — HIGH (ref 0–8)
NEUTS BAND # BLD: 6 % — SIGNIFICANT CHANGE UP (ref 0–8)
NEUTS BAND # BLD: 9.8 % — HIGH (ref 0–8)
NIGHT BLUE STAIN TISS: SIGNIFICANT CHANGE UP
NITRITE UR-MCNC: NEGATIVE — SIGNIFICANT CHANGE UP
NON-GYNECOLOGICAL CYTOLOGY STUDY: SIGNIFICANT CHANGE UP
NON-GYNECOLOGICAL CYTOLOGY STUDY: SIGNIFICANT CHANGE UP
NRBC # BLD: 0 /100 WBCS — SIGNIFICANT CHANGE UP (ref 0–0)
NRBC # BLD: 0 /100 — SIGNIFICANT CHANGE UP (ref 0–0)
NRBC # BLD: 1 /100 — HIGH (ref 0–0)
NRBC # BLD: 1 /100 — HIGH (ref 0–0)
NRBC # BLD: 3 /100 — HIGH (ref 0–0)
NRBC # BLD: SIGNIFICANT CHANGE UP /100 WBCS (ref 0–0)
NRBC # BLD: SIGNIFICANT CHANGE UP /100 WBCS (ref 0–0)
NT-PROBNP SERPL-SCNC: 4348 PG/ML — HIGH
ORGANISM # SPEC MICROSCOPIC CNT: SIGNIFICANT CHANGE UP
ORGANISM # SPEC MICROSCOPIC CNT: SIGNIFICANT CHANGE UP
OSMOLALITY UR: 334 MOSMOL/KG — SIGNIFICANT CHANGE UP (ref 100–650)
OVALOCYTES BLD QL SMEAR: SLIGHT — SIGNIFICANT CHANGE UP
PCO2 BLDA: 31 MMHG — LOW (ref 35–48)
PCO2 BLDA: 43 MMHG — SIGNIFICANT CHANGE UP (ref 35–48)
PCO2 BLDA: 43 MMHG — SIGNIFICANT CHANGE UP (ref 35–48)
PCO2 BLDV: 39 MMHG — LOW (ref 41–51)
PCO2 BLDV: 40 MMHG — LOW (ref 41–51)
PCP SPEC-MCNC: SIGNIFICANT CHANGE UP
PH BLDA: 7.22 — CRITICAL LOW (ref 7.35–7.45)
PH BLDA: 7.23 — CRITICAL LOW (ref 7.35–7.45)
PH BLDA: 7.3 — LOW (ref 7.35–7.45)
PH BLDV: 7.08 — CRITICAL LOW (ref 7.32–7.43)
PH BLDV: 7.12 — CRITICAL LOW (ref 7.32–7.43)
PH UR: 6 — SIGNIFICANT CHANGE UP (ref 5–8)
PH UR: 6 — SIGNIFICANT CHANGE UP (ref 5–8)
PH UR: 7 — SIGNIFICANT CHANGE UP (ref 5–8)
PHOSPHATE SERPL-MCNC: 3.4 MG/DL — SIGNIFICANT CHANGE UP (ref 2.5–4.5)
PHOSPHATE SERPL-MCNC: 3.7 MG/DL — SIGNIFICANT CHANGE UP (ref 2.5–4.5)
PHOSPHATE SERPL-MCNC: 4 MG/DL — SIGNIFICANT CHANGE UP (ref 2.5–4.5)
PHOSPHATE SERPL-MCNC: 4.1 MG/DL — SIGNIFICANT CHANGE UP (ref 2.5–4.5)
PHOSPHATE SERPL-MCNC: 4.5 MG/DL — SIGNIFICANT CHANGE UP (ref 2.5–4.5)
PHOSPHATE SERPL-MCNC: 4.6 MG/DL — HIGH (ref 2.5–4.5)
PHOSPHATE SERPL-MCNC: 4.6 MG/DL — HIGH (ref 2.5–4.5)
PHOSPHATE SERPL-MCNC: 4.8 MG/DL — HIGH (ref 2.5–4.5)
PHOSPHATE SERPL-MCNC: 5 MG/DL — HIGH (ref 2.5–4.5)
PHOSPHATE SERPL-MCNC: 5.1 MG/DL — HIGH (ref 2.5–4.5)
PHOSPHATE SERPL-MCNC: 5.2 MG/DL — HIGH (ref 2.5–4.5)
PHOSPHATE SERPL-MCNC: 5.3 MG/DL — HIGH (ref 2.5–4.5)
PHOSPHATE SERPL-MCNC: 5.3 MG/DL — HIGH (ref 2.5–4.5)
PHOSPHATE SERPL-MCNC: 5.9 MG/DL — HIGH (ref 2.5–4.5)
PLAT MORPH BLD: ABNORMAL
PLATELET # BLD AUTO: 302 K/UL — SIGNIFICANT CHANGE UP (ref 150–400)
PLATELET # BLD AUTO: 314 K/UL — SIGNIFICANT CHANGE UP (ref 150–400)
PLATELET # BLD AUTO: 319 K/UL — SIGNIFICANT CHANGE UP (ref 150–400)
PLATELET # BLD AUTO: 333 K/UL — SIGNIFICANT CHANGE UP (ref 150–400)
PLATELET # BLD AUTO: 347 K/UL — SIGNIFICANT CHANGE UP (ref 150–400)
PLATELET # BLD AUTO: 351 K/UL — SIGNIFICANT CHANGE UP (ref 150–400)
PLATELET # BLD AUTO: 366 K/UL — SIGNIFICANT CHANGE UP (ref 150–400)
PLATELET # BLD AUTO: 367 K/UL — SIGNIFICANT CHANGE UP (ref 150–400)
PLATELET # BLD AUTO: 399 K/UL — SIGNIFICANT CHANGE UP (ref 150–400)
PLATELET # BLD AUTO: 401 K/UL — HIGH (ref 150–400)
PLATELET # BLD AUTO: 405 K/UL — HIGH (ref 150–400)
PLATELET # BLD AUTO: 423 K/UL — HIGH (ref 150–400)
PLATELET # BLD AUTO: 431 K/UL — HIGH (ref 150–400)
PLATELET # BLD AUTO: 433 K/UL — HIGH (ref 150–400)
PLATELET # BLD AUTO: 461 K/UL — HIGH (ref 150–400)
PLATELET # BLD AUTO: 560 K/UL — HIGH (ref 150–400)
PLATELET COUNT - ESTIMATE: NORMAL — SIGNIFICANT CHANGE UP
PO2 BLDA: 127 MMHG — HIGH (ref 83–108)
PO2 BLDA: 182 MMHG — HIGH (ref 83–108)
PO2 BLDA: 93 MMHG — SIGNIFICANT CHANGE UP (ref 83–108)
PO2 BLDV: 34 MMHG — LOW (ref 35–40)
PO2 BLDV: 42 MMHG — HIGH (ref 35–40)
POIKILOCYTOSIS BLD QL AUTO: SLIGHT — SIGNIFICANT CHANGE UP
POLYCHROMASIA BLD QL SMEAR: SLIGHT — SIGNIFICANT CHANGE UP
POTASSIUM SERPL-MCNC: 3 MMOL/L — LOW (ref 3.5–5.3)
POTASSIUM SERPL-MCNC: 3.5 MMOL/L — SIGNIFICANT CHANGE UP (ref 3.5–5.3)
POTASSIUM SERPL-MCNC: 3.8 MMOL/L — SIGNIFICANT CHANGE UP (ref 3.5–5.3)
POTASSIUM SERPL-MCNC: 3.9 MMOL/L — SIGNIFICANT CHANGE UP (ref 3.5–5.3)
POTASSIUM SERPL-MCNC: 4 MMOL/L — SIGNIFICANT CHANGE UP (ref 3.5–5.3)
POTASSIUM SERPL-MCNC: 4.1 MMOL/L — SIGNIFICANT CHANGE UP (ref 3.5–5.3)
POTASSIUM SERPL-MCNC: 4.1 MMOL/L — SIGNIFICANT CHANGE UP (ref 3.5–5.3)
POTASSIUM SERPL-MCNC: 4.3 MMOL/L — SIGNIFICANT CHANGE UP (ref 3.5–5.3)
POTASSIUM SERPL-MCNC: 4.4 MMOL/L — SIGNIFICANT CHANGE UP (ref 3.5–5.3)
POTASSIUM SERPL-MCNC: 4.4 MMOL/L — SIGNIFICANT CHANGE UP (ref 3.5–5.3)
POTASSIUM SERPL-MCNC: 4.5 MMOL/L — SIGNIFICANT CHANGE UP (ref 3.5–5.3)
POTASSIUM SERPL-MCNC: 4.5 MMOL/L — SIGNIFICANT CHANGE UP (ref 3.5–5.3)
POTASSIUM SERPL-MCNC: 4.6 MMOL/L — SIGNIFICANT CHANGE UP (ref 3.5–5.3)
POTASSIUM SERPL-MCNC: 4.7 MMOL/L — SIGNIFICANT CHANGE UP (ref 3.5–5.3)
POTASSIUM SERPL-MCNC: 4.8 MMOL/L — SIGNIFICANT CHANGE UP (ref 3.5–5.3)
POTASSIUM SERPL-MCNC: 5.7 MMOL/L — HIGH (ref 3.5–5.3)
POTASSIUM SERPL-SCNC: 3 MMOL/L — LOW (ref 3.5–5.3)
POTASSIUM SERPL-SCNC: 3.5 MMOL/L — SIGNIFICANT CHANGE UP (ref 3.5–5.3)
POTASSIUM SERPL-SCNC: 3.8 MMOL/L — SIGNIFICANT CHANGE UP (ref 3.5–5.3)
POTASSIUM SERPL-SCNC: 3.9 MMOL/L — SIGNIFICANT CHANGE UP (ref 3.5–5.3)
POTASSIUM SERPL-SCNC: 4 MMOL/L — SIGNIFICANT CHANGE UP (ref 3.5–5.3)
POTASSIUM SERPL-SCNC: 4.1 MMOL/L — SIGNIFICANT CHANGE UP (ref 3.5–5.3)
POTASSIUM SERPL-SCNC: 4.1 MMOL/L — SIGNIFICANT CHANGE UP (ref 3.5–5.3)
POTASSIUM SERPL-SCNC: 4.3 MMOL/L — SIGNIFICANT CHANGE UP (ref 3.5–5.3)
POTASSIUM SERPL-SCNC: 4.4 MMOL/L — SIGNIFICANT CHANGE UP (ref 3.5–5.3)
POTASSIUM SERPL-SCNC: 4.4 MMOL/L — SIGNIFICANT CHANGE UP (ref 3.5–5.3)
POTASSIUM SERPL-SCNC: 4.5 MMOL/L — SIGNIFICANT CHANGE UP (ref 3.5–5.3)
POTASSIUM SERPL-SCNC: 4.5 MMOL/L — SIGNIFICANT CHANGE UP (ref 3.5–5.3)
POTASSIUM SERPL-SCNC: 4.6 MMOL/L — SIGNIFICANT CHANGE UP (ref 3.5–5.3)
POTASSIUM SERPL-SCNC: 4.7 MMOL/L — SIGNIFICANT CHANGE UP (ref 3.5–5.3)
POTASSIUM SERPL-SCNC: 4.8 MMOL/L — SIGNIFICANT CHANGE UP (ref 3.5–5.3)
POTASSIUM SERPL-SCNC: 5.7 MMOL/L — HIGH (ref 3.5–5.3)
PROT ?TM UR-MCNC: 138 MG/DL — HIGH (ref 0–12)
PROT ?TM UR-MCNC: 303 MG/DL — SIGNIFICANT CHANGE UP (ref 0–12)
PROT SERPL-MCNC: 6.1 G/DL — SIGNIFICANT CHANGE UP (ref 6–8.3)
PROT SERPL-MCNC: 6.3 G/DL — SIGNIFICANT CHANGE UP (ref 6–8.3)
PROT SERPL-MCNC: 6.5 G/DL — SIGNIFICANT CHANGE UP (ref 6–8.3)
PROT SERPL-MCNC: 6.6 G/DL — SIGNIFICANT CHANGE UP (ref 6–8.3)
PROT SERPL-MCNC: 7 G/DL — SIGNIFICANT CHANGE UP (ref 6–8.3)
PROT SERPL-MCNC: 7.1 G/DL — SIGNIFICANT CHANGE UP (ref 6–8.3)
PROT SERPL-MCNC: 7.2 G/DL — SIGNIFICANT CHANGE UP (ref 6–8.3)
PROT SERPL-MCNC: 7.5 G/DL — SIGNIFICANT CHANGE UP (ref 6–8.3)
PROT SERPL-MCNC: 7.8 G/DL — SIGNIFICANT CHANGE UP (ref 6.4–8.2)
PROT SERPL-MCNC: 8.1 G/DL — SIGNIFICANT CHANGE UP (ref 6.4–8.2)
PROT UR-MCNC: 100 MG/DL
PROT UR-MCNC: 100 MG/DL
PROT UR-MCNC: >=300 MG/DL
PROT/CREAT UR-RTO: 1.6 RATIO — HIGH (ref 0–0.2)
PROT/CREAT UR-RTO: 2.1 RATIO — SIGNIFICANT CHANGE UP (ref 0–0.2)
PROTHROM AB SERPL-ACNC: 13.5 SEC — HIGH (ref 10–12.9)
PROTHROM AB SERPL-ACNC: 13.7 SEC — HIGH (ref 10–12.9)
PROTHROM AB SERPL-ACNC: 13.9 SEC — HIGH (ref 10–12.9)
PROTHROM AB SERPL-ACNC: 14.2 SEC — HIGH (ref 10–12.9)
PROTHROM AB SERPL-ACNC: 16.8 SEC — HIGH (ref 10–12.9)
PTH-INTACT FLD-MCNC: 125 PG/ML — HIGH (ref 15–65)
RBC # BLD: 3.61 M/UL — LOW (ref 4.2–5.8)
RBC # BLD: 3.64 M/UL — LOW (ref 4.2–5.8)
RBC # BLD: 3.73 M/UL — LOW (ref 4.2–5.8)
RBC # BLD: 3.76 M/UL — LOW (ref 4.2–5.8)
RBC # BLD: 3.77 M/UL — LOW (ref 4.2–5.8)
RBC # BLD: 3.8 M/UL — LOW (ref 4.2–5.8)
RBC # BLD: 3.86 M/UL — LOW (ref 4.2–5.8)
RBC # BLD: 3.87 M/UL — LOW (ref 4.2–5.8)
RBC # BLD: 3.98 M/UL — LOW (ref 4.2–5.8)
RBC # BLD: 4.09 M/UL — LOW (ref 4.2–5.8)
RBC # BLD: 4.11 M/UL — LOW (ref 4.2–5.8)
RBC # BLD: 4.25 M/UL — SIGNIFICANT CHANGE UP (ref 4.2–5.8)
RBC # BLD: 4.39 M/UL — SIGNIFICANT CHANGE UP (ref 4.2–5.8)
RBC # BLD: 4.42 M/UL — SIGNIFICANT CHANGE UP (ref 4.2–5.8)
RBC # BLD: 4.83 M/UL — SIGNIFICANT CHANGE UP (ref 4.2–5.8)
RBC # BLD: 5.02 M/UL — SIGNIFICANT CHANGE UP (ref 4.2–5.8)
RBC # FLD: 15.7 % — HIGH (ref 10.3–14.5)
RBC # FLD: 16 % — HIGH (ref 10.3–14.5)
RBC # FLD: 16 % — HIGH (ref 10.3–14.5)
RBC # FLD: 16.3 % — HIGH (ref 10.3–14.5)
RBC # FLD: 16.4 % — HIGH (ref 10.3–14.5)
RBC # FLD: 16.6 % — HIGH (ref 10.3–14.5)
RBC # FLD: 16.7 % — HIGH (ref 10.3–14.5)
RBC # FLD: 17.1 % — HIGH (ref 10.3–14.5)
RBC # FLD: 17.3 % — HIGH (ref 10.3–14.5)
RBC # FLD: 17.6 % — HIGH (ref 10.3–14.5)
RBC # FLD: 17.7 % — HIGH (ref 10.3–14.5)
RBC # FLD: 17.8 % — HIGH (ref 10.3–14.5)
RBC # FLD: 18 % — HIGH (ref 10.3–14.5)
RBC # FLD: 18 % — HIGH (ref 10.3–14.5)
RBC # FLD: 18.3 % — HIGH (ref 10.3–14.5)
RBC # FLD: 18.4 % — HIGH (ref 10.3–14.5)
RBC BLD AUTO: ABNORMAL
RCV VOL RI: 91 /UL — HIGH (ref 0–5)
RH IG SCN BLD-IMP: POSITIVE — SIGNIFICANT CHANGE UP
SAO2 % BLDA: 96 % — SIGNIFICANT CHANGE UP (ref 95–100)
SAO2 % BLDA: 98 % — SIGNIFICANT CHANGE UP (ref 95–100)
SAO2 % BLDA: 99 % — SIGNIFICANT CHANGE UP (ref 95–100)
SAO2 % BLDV: 55 % — SIGNIFICANT CHANGE UP
SAO2 % BLDV: 68 % — SIGNIFICANT CHANGE UP
SCHISTOCYTES BLD QL AUTO: SLIGHT — SIGNIFICANT CHANGE UP
SMUDGE CELLS # BLD: PRESENT — SIGNIFICANT CHANGE UP
SODIUM SERPL-SCNC: 132 MMOL/L — SIGNIFICANT CHANGE UP (ref 132–145)
SODIUM SERPL-SCNC: 132 MMOL/L — SIGNIFICANT CHANGE UP (ref 132–145)
SODIUM SERPL-SCNC: 134 MMOL/L — LOW (ref 135–145)
SODIUM SERPL-SCNC: 136 MMOL/L — SIGNIFICANT CHANGE UP (ref 135–145)
SODIUM SERPL-SCNC: 137 MMOL/L — SIGNIFICANT CHANGE UP (ref 135–145)
SODIUM SERPL-SCNC: 138 MMOL/L — SIGNIFICANT CHANGE UP (ref 135–145)
SODIUM SERPL-SCNC: 138 MMOL/L — SIGNIFICANT CHANGE UP (ref 135–145)
SODIUM SERPL-SCNC: 139 MMOL/L — SIGNIFICANT CHANGE UP (ref 135–145)
SODIUM SERPL-SCNC: 139 MMOL/L — SIGNIFICANT CHANGE UP (ref 135–145)
SODIUM SERPL-SCNC: 141 MMOL/L — SIGNIFICANT CHANGE UP (ref 135–145)
SODIUM SERPL-SCNC: 142 MMOL/L — SIGNIFICANT CHANGE UP (ref 135–145)
SODIUM SERPL-SCNC: 143 MMOL/L — SIGNIFICANT CHANGE UP (ref 135–145)
SODIUM SERPL-SCNC: 145 MMOL/L — SIGNIFICANT CHANGE UP (ref 135–145)
SODIUM SERPL-SCNC: 147 MMOL/L — HIGH (ref 135–145)
SODIUM SERPL-SCNC: 147 MMOL/L — HIGH (ref 135–145)
SODIUM SERPL-SCNC: 148 MMOL/L — HIGH (ref 135–145)
SODIUM SERPL-SCNC: 149 MMOL/L — HIGH (ref 135–145)
SODIUM SERPL-SCNC: 149 MMOL/L — HIGH (ref 135–145)
SODIUM SERPL-SCNC: 151 MMOL/L — HIGH (ref 135–145)
SODIUM UR-SCNC: 49 MMOL/L — SIGNIFICANT CHANGE UP
SODIUM UR-SCNC: 50 MMOL/L — SIGNIFICANT CHANGE UP
SP GR SPEC: 1.01 — SIGNIFICANT CHANGE UP (ref 1–1.03)
SP GR SPEC: 1.01 — SIGNIFICANT CHANGE UP (ref 1–1.03)
SP GR SPEC: 1.02 — SIGNIFICANT CHANGE UP (ref 1–1.03)
SPECIMEN SOURCE FLD: SIGNIFICANT CHANGE UP
SPECIMEN SOURCE: SIGNIFICANT CHANGE UP
SPHEROCYTES BLD QL SMEAR: SLIGHT — SIGNIFICANT CHANGE UP
TOTAL NUCLEATED CELL COUNT, BODY FLUID: 1550 /UL — HIGH (ref 0–5)
TRIGL SERPL-MCNC: 144 MG/DL — SIGNIFICANT CHANGE UP (ref 10–149)
TRIGL SERPL-MCNC: 147 MG/DL — SIGNIFICANT CHANGE UP (ref 10–149)
TROPONIN I SERPL-MCNC: 0.02 NG/ML — SIGNIFICANT CHANGE UP (ref 0.02–0.06)
TROPONIN I SERPL-MCNC: 0.03 NG/ML — SIGNIFICANT CHANGE UP (ref 0.02–0.06)
TSH SERPL-MCNC: 1.06 UIU/ML — SIGNIFICANT CHANGE UP (ref 0.36–3.74)
TUBE TYPE: SIGNIFICANT CHANGE UP
UROBILINOGEN FLD QL: 0.2 E.U./DL — SIGNIFICANT CHANGE UP
UUN UR-MCNC: 375 MG/DL — SIGNIFICANT CHANGE UP
UUN UR-MCNC: 432 MG/DL — SIGNIFICANT CHANGE UP
VANCOMYCIN FLD-MCNC: 12.6 UG/ML — SIGNIFICANT CHANGE UP
VANCOMYCIN TROUGH SERPL-MCNC: 19.6 UG/ML — SIGNIFICANT CHANGE UP (ref 10–20)
VANCOMYCIN TROUGH SERPL-MCNC: 28.2 UG/ML — CRITICAL HIGH (ref 10–20)
VIT D25+D1,25 OH+D1,25 PNL SERPL-MCNC: 32.5 PG/ML — SIGNIFICANT CHANGE UP (ref 19.9–79.3)
WBC # BLD: 22.33 K/UL — HIGH (ref 3.8–10.5)
WBC # BLD: 23.18 K/UL — HIGH (ref 3.8–10.5)
WBC # BLD: 23.38 K/UL — HIGH (ref 3.8–10.5)
WBC # BLD: 25.81 K/UL — HIGH (ref 3.8–10.5)
WBC # BLD: 27.46 K/UL — HIGH (ref 3.8–10.5)
WBC # BLD: 28.46 K/UL — HIGH (ref 3.8–10.5)
WBC # BLD: 28.66 K/UL — HIGH (ref 3.8–10.5)
WBC # BLD: 28.75 K/UL — HIGH (ref 3.8–10.5)
WBC # BLD: 28.83 K/UL — HIGH (ref 3.8–10.5)
WBC # BLD: 29.55 K/UL — HIGH (ref 3.8–10.5)
WBC # BLD: 30.16 K/UL — HIGH (ref 3.8–10.5)
WBC # BLD: 30.8 K/UL — HIGH (ref 3.8–10.5)
WBC # BLD: 31.54 K/UL — HIGH (ref 3.8–10.5)
WBC # BLD: 32 K/UL — HIGH (ref 3.8–10.5)
WBC # BLD: 35.89 K/UL — HIGH (ref 3.8–10.5)
WBC # BLD: 42.03 K/UL — CRITICAL HIGH (ref 3.8–10.5)
WBC # FLD AUTO: 22.33 K/UL — HIGH (ref 3.8–10.5)
WBC # FLD AUTO: 23.18 K/UL — HIGH (ref 3.8–10.5)
WBC # FLD AUTO: 23.38 K/UL — HIGH (ref 3.8–10.5)
WBC # FLD AUTO: 25.81 K/UL — HIGH (ref 3.8–10.5)
WBC # FLD AUTO: 27.46 K/UL — HIGH (ref 3.8–10.5)
WBC # FLD AUTO: 28.46 K/UL — HIGH (ref 3.8–10.5)
WBC # FLD AUTO: 28.66 K/UL — HIGH (ref 3.8–10.5)
WBC # FLD AUTO: 28.75 K/UL — HIGH (ref 3.8–10.5)
WBC # FLD AUTO: 28.83 K/UL — HIGH (ref 3.8–10.5)
WBC # FLD AUTO: 29.55 K/UL — HIGH (ref 3.8–10.5)
WBC # FLD AUTO: 30.16 K/UL — HIGH (ref 3.8–10.5)
WBC # FLD AUTO: 30.8 K/UL — HIGH (ref 3.8–10.5)
WBC # FLD AUTO: 31.54 K/UL — HIGH (ref 3.8–10.5)
WBC # FLD AUTO: 32 K/UL — HIGH (ref 3.8–10.5)
WBC # FLD AUTO: 35.89 K/UL — HIGH (ref 3.8–10.5)
WBC # FLD AUTO: 42.03 K/UL — CRITICAL HIGH (ref 3.8–10.5)

## 2019-01-01 PROCEDURE — 87040 BLOOD CULTURE FOR BACTERIA: CPT

## 2019-01-01 PROCEDURE — 99233 SBSQ HOSP IP/OBS HIGH 50: CPT | Mod: GC

## 2019-01-01 PROCEDURE — 88112 CYTOPATH CELL ENHANCE TECH: CPT

## 2019-01-01 PROCEDURE — 86901 BLOOD TYPING SEROLOGIC RH(D): CPT

## 2019-01-01 PROCEDURE — 94002 VENT MGMT INPAT INIT DAY: CPT

## 2019-01-01 PROCEDURE — 84295 ASSAY OF SERUM SODIUM: CPT

## 2019-01-01 PROCEDURE — 94640 AIRWAY INHALATION TREATMENT: CPT

## 2019-01-01 PROCEDURE — 93970 EXTREMITY STUDY: CPT | Mod: 26

## 2019-01-01 PROCEDURE — 99233 SBSQ HOSP IP/OBS HIGH 50: CPT | Mod: GC,25

## 2019-01-01 PROCEDURE — 99232 SBSQ HOSP IP/OBS MODERATE 35: CPT | Mod: GC

## 2019-01-01 PROCEDURE — 76870 US EXAM SCROTUM: CPT | Mod: 26

## 2019-01-01 PROCEDURE — 83036 HEMOGLOBIN GLYCOSYLATED A1C: CPT

## 2019-01-01 PROCEDURE — 93005 ELECTROCARDIOGRAM TRACING: CPT

## 2019-01-01 PROCEDURE — 71045 X-RAY EXAM CHEST 1 VIEW: CPT | Mod: 26

## 2019-01-01 PROCEDURE — 84443 ASSAY THYROID STIM HORMONE: CPT

## 2019-01-01 PROCEDURE — P9047: CPT

## 2019-01-01 PROCEDURE — 85610 PROTHROMBIN TIME: CPT

## 2019-01-01 PROCEDURE — 99358 PROLONG SERVICE W/O CONTACT: CPT

## 2019-01-01 PROCEDURE — 99291 CRITICAL CARE FIRST HOUR: CPT

## 2019-01-01 PROCEDURE — 36415 COLL VENOUS BLD VENIPUNCTURE: CPT

## 2019-01-01 PROCEDURE — 83735 ASSAY OF MAGNESIUM: CPT

## 2019-01-01 PROCEDURE — 94003 VENT MGMT INPAT SUBQ DAY: CPT

## 2019-01-01 PROCEDURE — 74176 CT ABD & PELVIS W/O CONTRAST: CPT | Mod: 26

## 2019-01-01 PROCEDURE — 80307 DRUG TEST PRSMV CHEM ANLYZR: CPT

## 2019-01-01 PROCEDURE — 82310 ASSAY OF CALCIUM: CPT

## 2019-01-01 PROCEDURE — 80202 ASSAY OF VANCOMYCIN: CPT

## 2019-01-01 PROCEDURE — 76705 ECHO EXAM OF ABDOMEN: CPT | Mod: 26

## 2019-01-01 PROCEDURE — 89051 BODY FLUID CELL COUNT: CPT

## 2019-01-01 PROCEDURE — 94660 CPAP INITIATION&MGMT: CPT

## 2019-01-01 PROCEDURE — 99232 SBSQ HOSP IP/OBS MODERATE 35: CPT | Mod: 25

## 2019-01-01 PROCEDURE — 86850 RBC ANTIBODY SCREEN: CPT

## 2019-01-01 PROCEDURE — 82553 CREATINE MB FRACTION: CPT

## 2019-01-01 PROCEDURE — 76705 ECHO EXAM OF ABDOMEN: CPT

## 2019-01-01 PROCEDURE — 84484 ASSAY OF TROPONIN QUANT: CPT

## 2019-01-01 PROCEDURE — 93010 ELECTROCARDIOGRAM REPORT: CPT

## 2019-01-01 PROCEDURE — 99285 EMERGENCY DEPT VISIT HI MDM: CPT | Mod: 25

## 2019-01-01 PROCEDURE — 87205 SMEAR GRAM STAIN: CPT

## 2019-01-01 PROCEDURE — 99223 1ST HOSP IP/OBS HIGH 75: CPT

## 2019-01-01 PROCEDURE — 99498 ADVNCD CARE PLAN ADDL 30 MIN: CPT

## 2019-01-01 PROCEDURE — 84156 ASSAY OF PROTEIN URINE: CPT

## 2019-01-01 PROCEDURE — 82375 ASSAY CARBOXYHB QUANT: CPT

## 2019-01-01 PROCEDURE — 31622 DX BRONCHOSCOPE/WASH: CPT | Mod: GC

## 2019-01-01 PROCEDURE — 99233 SBSQ HOSP IP/OBS HIGH 50: CPT

## 2019-01-01 PROCEDURE — 86900 BLOOD TYPING SEROLOGIC ABO: CPT

## 2019-01-01 PROCEDURE — 87075 CULTR BACTERIA EXCEPT BLOOD: CPT

## 2019-01-01 PROCEDURE — 83935 ASSAY OF URINE OSMOLALITY: CPT

## 2019-01-01 PROCEDURE — 84100 ASSAY OF PHOSPHORUS: CPT

## 2019-01-01 PROCEDURE — 82140 ASSAY OF AMMONIA: CPT

## 2019-01-01 PROCEDURE — 78226 HEPATOBILIARY SYSTEM IMAGING: CPT

## 2019-01-01 PROCEDURE — 87086 URINE CULTURE/COLONY COUNT: CPT

## 2019-01-01 PROCEDURE — 93306 TTE W/DOPPLER COMPLETE: CPT | Mod: 26

## 2019-01-01 PROCEDURE — 80076 HEPATIC FUNCTION PANEL: CPT

## 2019-01-01 PROCEDURE — 99232 SBSQ HOSP IP/OBS MODERATE 35: CPT

## 2019-01-01 PROCEDURE — 71045 X-RAY EXAM CHEST 1 VIEW: CPT | Mod: 26,76

## 2019-01-01 PROCEDURE — 99231 SBSQ HOSP IP/OBS SF/LOW 25: CPT | Mod: GC

## 2019-01-01 PROCEDURE — 83880 ASSAY OF NATRIURETIC PEPTIDE: CPT

## 2019-01-01 PROCEDURE — 82652 VIT D 1 25-DIHYDROXY: CPT

## 2019-01-01 PROCEDURE — 93970 EXTREMITY STUDY: CPT

## 2019-01-01 PROCEDURE — 85025 COMPLETE CBC W/AUTO DIFF WBC: CPT

## 2019-01-01 PROCEDURE — 87206 SMEAR FLUORESCENT/ACID STAI: CPT

## 2019-01-01 PROCEDURE — 84540 ASSAY OF URINE/UREA-N: CPT

## 2019-01-01 PROCEDURE — 84300 ASSAY OF URINE SODIUM: CPT

## 2019-01-01 PROCEDURE — 82550 ASSAY OF CK (CPK): CPT

## 2019-01-01 PROCEDURE — P9045: CPT

## 2019-01-01 PROCEDURE — 31500 INSERT EMERGENCY AIRWAY: CPT | Mod: GC

## 2019-01-01 PROCEDURE — 87015 SPECIMEN INFECT AGNT CONCNTJ: CPT

## 2019-01-01 PROCEDURE — 36556 INSERT NON-TUNNEL CV CATH: CPT | Mod: GC

## 2019-01-01 PROCEDURE — 87186 SC STD MICRODIL/AGAR DIL: CPT

## 2019-01-01 PROCEDURE — 80053 COMPREHEN METABOLIC PANEL: CPT

## 2019-01-01 PROCEDURE — 71045 X-RAY EXAM CHEST 1 VIEW: CPT | Mod: 26,77

## 2019-01-01 PROCEDURE — 82306 VITAMIN D 25 HYDROXY: CPT

## 2019-01-01 PROCEDURE — 71250 CT THORAX DX C-: CPT

## 2019-01-01 PROCEDURE — 85027 COMPLETE CBC AUTOMATED: CPT

## 2019-01-01 PROCEDURE — 84478 ASSAY OF TRIGLYCERIDES: CPT

## 2019-01-01 PROCEDURE — 80151 DRUG ASSAY AMIODARONE: CPT

## 2019-01-01 PROCEDURE — 71250 CT THORAX DX C-: CPT | Mod: 26

## 2019-01-01 PROCEDURE — 87116 MYCOBACTERIA CULTURE: CPT

## 2019-01-01 PROCEDURE — 88305 TISSUE EXAM BY PATHOLOGIST: CPT

## 2019-01-01 PROCEDURE — 82962 GLUCOSE BLOOD TEST: CPT

## 2019-01-01 PROCEDURE — 82803 BLOOD GASES ANY COMBINATION: CPT

## 2019-01-01 PROCEDURE — 83970 ASSAY OF PARATHORMONE: CPT

## 2019-01-01 PROCEDURE — 81001 URINALYSIS AUTO W/SCOPE: CPT

## 2019-01-01 PROCEDURE — 82570 ASSAY OF URINE CREATININE: CPT

## 2019-01-01 PROCEDURE — 84132 ASSAY OF SERUM POTASSIUM: CPT

## 2019-01-01 PROCEDURE — 96376 TX/PRO/DX INJ SAME DRUG ADON: CPT

## 2019-01-01 PROCEDURE — A9537: CPT

## 2019-01-01 PROCEDURE — 78226 HEPATOBILIARY SYSTEM IMAGING: CPT | Mod: 26

## 2019-01-01 PROCEDURE — 83605 ASSAY OF LACTIC ACID: CPT

## 2019-01-01 PROCEDURE — 83690 ASSAY OF LIPASE: CPT

## 2019-01-01 PROCEDURE — 74176 CT ABD & PELVIS W/O CONTRAST: CPT

## 2019-01-01 PROCEDURE — 80048 BASIC METABOLIC PNL TOTAL CA: CPT

## 2019-01-01 PROCEDURE — 99497 ADVNCD CARE PLAN 30 MIN: CPT | Mod: 25

## 2019-01-01 PROCEDURE — 93306 TTE W/DOPPLER COMPLETE: CPT

## 2019-01-01 PROCEDURE — 99497 ADVNCD CARE PLAN 30 MIN: CPT

## 2019-01-01 PROCEDURE — 96374 THER/PROPH/DIAG INJ IV PUSH: CPT

## 2019-01-01 PROCEDURE — 87102 FUNGUS ISOLATION CULTURE: CPT

## 2019-01-01 PROCEDURE — 87070 CULTURE OTHR SPECIMN AEROBIC: CPT

## 2019-01-01 PROCEDURE — 85730 THROMBOPLASTIN TIME PARTIAL: CPT

## 2019-01-01 PROCEDURE — 76870 US EXAM SCROTUM: CPT

## 2019-01-01 PROCEDURE — 96375 TX/PRO/DX INJ NEW DRUG ADDON: CPT

## 2019-01-01 PROCEDURE — 71045 X-RAY EXAM CHEST 1 VIEW: CPT

## 2019-01-01 PROCEDURE — 82330 ASSAY OF CALCIUM: CPT

## 2019-01-01 RX ORDER — PROPOFOL 10 MG/ML
5 INJECTION, EMULSION INTRAVENOUS
Qty: 1000 | Refills: 0 | Status: DISCONTINUED | OUTPATIENT
Start: 2019-01-01 | End: 2019-01-01

## 2019-01-01 RX ORDER — FENTANYL CITRATE 50 UG/ML
0.5 INJECTION INTRAVENOUS
Qty: 2500 | Refills: 0 | Status: DISCONTINUED | OUTPATIENT
Start: 2019-01-01 | End: 2019-01-01

## 2019-01-01 RX ORDER — SODIUM BICARBONATE 1 MEQ/ML
650 SYRINGE (ML) INTRAVENOUS DAILY
Refills: 0 | Status: DISCONTINUED | OUTPATIENT
Start: 2019-01-01 | End: 2019-01-01

## 2019-01-01 RX ORDER — METHADONE HYDROCHLORIDE 40 MG/1
5 TABLET ORAL DAILY
Refills: 0 | Status: DISCONTINUED | OUTPATIENT
Start: 2019-01-01 | End: 2019-01-01

## 2019-01-01 RX ORDER — SODIUM BICARBONATE 1 MEQ/ML
975 SYRINGE (ML) INTRAVENOUS EVERY 8 HOURS
Refills: 0 | Status: DISCONTINUED | OUTPATIENT
Start: 2019-01-01 | End: 2019-01-01

## 2019-01-01 RX ORDER — DEXTROSE 50 % IN WATER 50 %
25 SYRINGE (ML) INTRAVENOUS ONCE
Refills: 0 | Status: DISCONTINUED | OUTPATIENT
Start: 2019-01-01 | End: 2019-01-01

## 2019-01-01 RX ORDER — MIDAZOLAM HYDROCHLORIDE 1 MG/ML
2 INJECTION, SOLUTION INTRAMUSCULAR; INTRAVENOUS ONCE
Refills: 0 | Status: DISCONTINUED | OUTPATIENT
Start: 2019-01-01 | End: 2019-01-01

## 2019-01-01 RX ORDER — POTASSIUM CHLORIDE 20 MEQ
20 PACKET (EA) ORAL
Refills: 0 | Status: DISCONTINUED | OUTPATIENT
Start: 2019-01-01 | End: 2019-01-01

## 2019-01-01 RX ORDER — POTASSIUM CHLORIDE 20 MEQ
20 PACKET (EA) ORAL
Refills: 0 | Status: COMPLETED | OUTPATIENT
Start: 2019-01-01 | End: 2019-01-01

## 2019-01-01 RX ORDER — METHYLPHENIDATE HCL 5 MG
0 TABLET ORAL
Qty: 0 | Refills: 0 | DISCHARGE

## 2019-01-01 RX ORDER — CEFEPIME 1 G/1
1000 INJECTION, POWDER, FOR SOLUTION INTRAMUSCULAR; INTRAVENOUS ONCE
Refills: 0 | Status: COMPLETED | OUTPATIENT
Start: 2019-01-01 | End: 2019-01-01

## 2019-01-01 RX ORDER — HEPARIN SODIUM 5000 [USP'U]/ML
7500 INJECTION INTRAVENOUS; SUBCUTANEOUS EVERY 8 HOURS
Refills: 0 | Status: DISCONTINUED | OUTPATIENT
Start: 2019-01-01 | End: 2019-01-01

## 2019-01-01 RX ORDER — MAGNESIUM SULFATE 500 MG/ML
2 VIAL (ML) INJECTION ONCE
Refills: 0 | Status: COMPLETED | OUTPATIENT
Start: 2019-01-01 | End: 2019-01-01

## 2019-01-01 RX ORDER — SODIUM BICARBONATE 1 MEQ/ML
650 SYRINGE (ML) INTRAVENOUS EVERY 12 HOURS
Refills: 0 | Status: DISCONTINUED | OUTPATIENT
Start: 2019-01-01 | End: 2019-01-01

## 2019-01-01 RX ORDER — CHLORHEXIDINE GLUCONATE 213 G/1000ML
15 SOLUTION TOPICAL
Refills: 0 | Status: DISCONTINUED | OUTPATIENT
Start: 2019-01-01 | End: 2019-01-01

## 2019-01-01 RX ORDER — SERTRALINE 25 MG/1
150 TABLET, FILM COATED ORAL
Qty: 0 | Refills: 0 | DISCHARGE

## 2019-01-01 RX ORDER — ATORVASTATIN CALCIUM 80 MG/1
1 TABLET, FILM COATED ORAL
Qty: 0 | Refills: 0 | DISCHARGE

## 2019-01-01 RX ORDER — HALOPERIDOL DECANOATE 100 MG/ML
2 INJECTION INTRAMUSCULAR ONCE
Refills: 0 | Status: DISCONTINUED | OUTPATIENT
Start: 2019-01-01 | End: 2019-01-01

## 2019-01-01 RX ORDER — VANCOMYCIN HCL 1 G
1000 VIAL (EA) INTRAVENOUS ONCE
Refills: 0 | Status: COMPLETED | OUTPATIENT
Start: 2019-01-01 | End: 2019-01-01

## 2019-01-01 RX ORDER — FUROSEMIDE 40 MG
20 TABLET ORAL
Qty: 500 | Refills: 0 | Status: DISCONTINUED | OUTPATIENT
Start: 2019-01-01 | End: 2019-01-01

## 2019-01-01 RX ORDER — SODIUM BICARBONATE 1 MEQ/ML
1300 SYRINGE (ML) INTRAVENOUS EVERY 8 HOURS
Refills: 0 | Status: DISCONTINUED | OUTPATIENT
Start: 2019-01-01 | End: 2019-01-01

## 2019-01-01 RX ORDER — SODIUM CHLORIDE 9 MG/ML
1000 INJECTION, SOLUTION INTRAVENOUS
Refills: 0 | Status: DISCONTINUED | OUTPATIENT
Start: 2019-01-01 | End: 2019-01-01

## 2019-01-01 RX ORDER — SODIUM BICARBONATE 1 MEQ/ML
100 SYRINGE (ML) INTRAVENOUS ONCE
Refills: 0 | Status: COMPLETED | OUTPATIENT
Start: 2019-01-01 | End: 2019-01-01

## 2019-01-01 RX ORDER — DEXTROSE 50 % IN WATER 50 %
50 SYRINGE (ML) INTRAVENOUS ONCE
Refills: 0 | Status: COMPLETED | OUTPATIENT
Start: 2019-01-01 | End: 2019-01-01

## 2019-01-01 RX ORDER — SODIUM BICARBONATE 1 MEQ/ML
0.12 SYRINGE (ML) INTRAVENOUS
Qty: 150 | Refills: 0 | Status: DISCONTINUED | OUTPATIENT
Start: 2019-01-01 | End: 2019-01-01

## 2019-01-01 RX ORDER — PANTOPRAZOLE SODIUM 20 MG/1
40 TABLET, DELAYED RELEASE ORAL DAILY
Refills: 0 | Status: DISCONTINUED | OUTPATIENT
Start: 2019-01-01 | End: 2019-01-01

## 2019-01-01 RX ORDER — ACETAMINOPHEN 500 MG
650 TABLET ORAL ONCE
Refills: 0 | Status: DISCONTINUED | OUTPATIENT
Start: 2019-01-01 | End: 2019-01-01

## 2019-01-01 RX ORDER — SODIUM BICARBONATE 1 MEQ/ML
650 SYRINGE (ML) INTRAVENOUS EVERY 8 HOURS
Refills: 0 | Status: DISCONTINUED | OUTPATIENT
Start: 2019-01-01 | End: 2019-01-01

## 2019-01-01 RX ORDER — METHADONE HYDROCHLORIDE 40 MG/1
0 TABLET ORAL
Qty: 0 | Refills: 0 | DISCHARGE

## 2019-01-01 RX ORDER — MORPHINE SULFATE 50 MG/1
2 CAPSULE, EXTENDED RELEASE ORAL
Refills: 0 | Status: DISCONTINUED | OUTPATIENT
Start: 2019-01-01 | End: 2019-01-01

## 2019-01-01 RX ORDER — VANCOMYCIN HCL 1 G
500 VIAL (EA) INTRAVENOUS ONCE
Refills: 0 | Status: COMPLETED | OUTPATIENT
Start: 2019-01-01 | End: 2019-01-01

## 2019-01-01 RX ORDER — MORPHINE SULFATE 50 MG/1
1 CAPSULE, EXTENDED RELEASE ORAL
Qty: 100 | Refills: 0 | Status: DISCONTINUED | OUTPATIENT
Start: 2019-01-01 | End: 2019-01-01

## 2019-01-01 RX ORDER — DEXTROSE 50 % IN WATER 50 %
12.5 SYRINGE (ML) INTRAVENOUS ONCE
Refills: 0 | Status: DISCONTINUED | OUTPATIENT
Start: 2019-01-01 | End: 2019-01-01

## 2019-01-01 RX ORDER — NOREPINEPHRINE BITARTRATE/D5W 8 MG/250ML
0.05 PLASTIC BAG, INJECTION (ML) INTRAVENOUS
Qty: 8 | Refills: 0 | Status: DISCONTINUED | OUTPATIENT
Start: 2019-01-01 | End: 2019-01-01

## 2019-01-01 RX ORDER — FUROSEMIDE 40 MG
80 TABLET ORAL ONCE
Refills: 0 | Status: COMPLETED | OUTPATIENT
Start: 2019-01-01 | End: 2019-01-01

## 2019-01-01 RX ORDER — ACETAMINOPHEN 500 MG
650 TABLET ORAL ONCE
Refills: 0 | Status: COMPLETED | OUTPATIENT
Start: 2019-01-01 | End: 2019-01-01

## 2019-01-01 RX ORDER — SODIUM CHLORIDE 9 MG/ML
1000 INJECTION INTRAMUSCULAR; INTRAVENOUS; SUBCUTANEOUS
Refills: 0 | Status: DISCONTINUED | OUTPATIENT
Start: 2019-01-01 | End: 2019-01-01

## 2019-01-01 RX ORDER — DOCUSATE SODIUM 100 MG
100 CAPSULE ORAL
Refills: 0 | Status: DISCONTINUED | OUTPATIENT
Start: 2019-01-01 | End: 2019-01-01

## 2019-01-01 RX ORDER — ONDANSETRON 8 MG/1
4 TABLET, FILM COATED ORAL EVERY 6 HOURS
Refills: 0 | Status: DISCONTINUED | OUTPATIENT
Start: 2019-01-01 | End: 2019-01-01

## 2019-01-01 RX ORDER — MEROPENEM 1 G/30ML
1000 INJECTION INTRAVENOUS EVERY 12 HOURS
Refills: 0 | Status: DISCONTINUED | OUTPATIENT
Start: 2019-01-01 | End: 2019-01-01

## 2019-01-01 RX ORDER — SODIUM CHLORIDE 9 MG/ML
2000 INJECTION INTRAMUSCULAR; INTRAVENOUS; SUBCUTANEOUS ONCE
Refills: 0 | Status: COMPLETED | OUTPATIENT
Start: 2019-01-01 | End: 2019-01-01

## 2019-01-01 RX ORDER — IPRATROPIUM/ALBUTEROL SULFATE 18-103MCG
3 AEROSOL WITH ADAPTER (GRAM) INHALATION EVERY 4 HOURS
Refills: 0 | Status: DISCONTINUED | OUTPATIENT
Start: 2019-01-01 | End: 2019-01-01

## 2019-01-01 RX ORDER — HYDROMORPHONE HYDROCHLORIDE 2 MG/ML
0.5 INJECTION INTRAMUSCULAR; INTRAVENOUS; SUBCUTANEOUS EVERY 4 HOURS
Refills: 0 | Status: DISCONTINUED | OUTPATIENT
Start: 2019-01-01 | End: 2019-01-01

## 2019-01-01 RX ORDER — QUETIAPINE FUMARATE 200 MG/1
50 TABLET, FILM COATED ORAL
Refills: 0 | Status: DISCONTINUED | OUTPATIENT
Start: 2019-01-01 | End: 2019-01-01

## 2019-01-01 RX ORDER — QUETIAPINE FUMARATE 200 MG/1
25 TABLET, FILM COATED ORAL EVERY 12 HOURS
Refills: 0 | Status: DISCONTINUED | OUTPATIENT
Start: 2019-01-01 | End: 2019-01-01

## 2019-01-01 RX ORDER — HALOPERIDOL DECANOATE 100 MG/ML
2 INJECTION INTRAMUSCULAR ONCE
Refills: 0 | Status: COMPLETED | OUTPATIENT
Start: 2019-01-01 | End: 2019-01-01

## 2019-01-01 RX ORDER — KETAMINE HYDROCHLORIDE 100 MG/ML
30 INJECTION INTRAMUSCULAR; INTRAVENOUS ONCE
Refills: 0 | Status: DISCONTINUED | OUTPATIENT
Start: 2019-01-01 | End: 2019-01-01

## 2019-01-01 RX ORDER — ATENOLOL 25 MG/1
1 TABLET ORAL
Qty: 0 | Refills: 0 | DISCHARGE

## 2019-01-01 RX ORDER — FUROSEMIDE 40 MG
200 TABLET ORAL ONCE
Refills: 0 | Status: COMPLETED | OUTPATIENT
Start: 2019-01-01 | End: 2019-01-01

## 2019-01-01 RX ORDER — QUETIAPINE FUMARATE 200 MG/1
25 TABLET, FILM COATED ORAL ONCE
Refills: 0 | Status: COMPLETED | OUTPATIENT
Start: 2019-01-01 | End: 2019-01-01

## 2019-01-01 RX ORDER — OMEPRAZOLE 10 MG/1
0 CAPSULE, DELAYED RELEASE ORAL
Qty: 0 | Refills: 0 | DISCHARGE

## 2019-01-01 RX ORDER — SODIUM CHLORIDE 9 MG/ML
1000 INJECTION, SOLUTION INTRAVENOUS ONCE
Refills: 0 | Status: COMPLETED | OUTPATIENT
Start: 2019-01-01 | End: 2019-01-01

## 2019-01-01 RX ORDER — FUROSEMIDE 40 MG
0 TABLET ORAL
Qty: 0 | Refills: 0 | DISCHARGE

## 2019-01-01 RX ORDER — KETOROLAC TROMETHAMINE 30 MG/ML
15 SYRINGE (ML) INJECTION ONCE
Refills: 0 | Status: DISCONTINUED | OUTPATIENT
Start: 2019-01-01 | End: 2019-01-01

## 2019-01-01 RX ORDER — MAGNESIUM SULFATE 500 MG/ML
1 VIAL (ML) INJECTION ONCE
Refills: 0 | Status: COMPLETED | OUTPATIENT
Start: 2019-01-01 | End: 2019-01-01

## 2019-01-01 RX ORDER — GLUCAGON INJECTION, SOLUTION 0.5 MG/.1ML
1 INJECTION, SOLUTION SUBCUTANEOUS ONCE
Refills: 0 | Status: DISCONTINUED | OUTPATIENT
Start: 2019-01-01 | End: 2019-01-01

## 2019-01-01 RX ORDER — VANCOMYCIN HCL 1 G
1000 VIAL (EA) INTRAVENOUS ONCE
Refills: 0 | Status: DISCONTINUED | OUTPATIENT
Start: 2019-01-01 | End: 2019-01-01

## 2019-01-01 RX ORDER — MORPHINE SULFATE 50 MG/1
4 CAPSULE, EXTENDED RELEASE ORAL ONCE
Refills: 0 | Status: DISCONTINUED | OUTPATIENT
Start: 2019-01-01 | End: 2019-01-01

## 2019-01-01 RX ORDER — POTASSIUM CHLORIDE 20 MEQ
20 PACKET (EA) ORAL ONCE
Refills: 0 | Status: COMPLETED | OUTPATIENT
Start: 2019-01-01 | End: 2019-01-01

## 2019-01-01 RX ORDER — QUETIAPINE FUMARATE 200 MG/1
50 TABLET, FILM COATED ORAL EVERY 12 HOURS
Refills: 0 | Status: DISCONTINUED | OUTPATIENT
Start: 2019-01-01 | End: 2019-01-01

## 2019-01-01 RX ORDER — FENTANYL CITRATE 50 UG/ML
50 INJECTION INTRAVENOUS ONCE
Refills: 0 | Status: DISCONTINUED | OUTPATIENT
Start: 2019-01-01 | End: 2019-01-01

## 2019-01-01 RX ORDER — CEFEPIME 1 G/1
500 INJECTION, POWDER, FOR SOLUTION INTRAMUSCULAR; INTRAVENOUS ONCE
Refills: 0 | Status: DISCONTINUED | OUTPATIENT
Start: 2019-01-01 | End: 2019-01-01

## 2019-01-01 RX ORDER — MEROPENEM 1 G/30ML
1000 INJECTION INTRAVENOUS EVERY 12 HOURS
Refills: 0 | Status: COMPLETED | OUTPATIENT
Start: 2019-01-01 | End: 2019-01-01

## 2019-01-01 RX ORDER — SODIUM BICARBONATE 1 MEQ/ML
0.23 SYRINGE (ML) INTRAVENOUS
Qty: 150 | Refills: 0 | Status: DISCONTINUED | OUTPATIENT
Start: 2019-01-01 | End: 2019-01-01

## 2019-01-01 RX ORDER — METOPROLOL TARTRATE 50 MG
2.5 TABLET ORAL EVERY 12 HOURS
Refills: 0 | Status: DISCONTINUED | OUTPATIENT
Start: 2019-01-01 | End: 2019-01-01

## 2019-01-01 RX ORDER — INSULIN LISPRO 100/ML
VIAL (ML) SUBCUTANEOUS
Refills: 0 | Status: DISCONTINUED | OUTPATIENT
Start: 2019-01-01 | End: 2019-01-01

## 2019-01-01 RX ORDER — MEROPENEM 1 G/30ML
1000 INJECTION INTRAVENOUS EVERY 8 HOURS
Refills: 0 | Status: DISCONTINUED | OUTPATIENT
Start: 2019-01-01 | End: 2019-01-01

## 2019-01-01 RX ORDER — FUROSEMIDE 40 MG
120 TABLET ORAL ONCE
Refills: 0 | Status: COMPLETED | OUTPATIENT
Start: 2019-01-01 | End: 2019-01-01

## 2019-01-01 RX ORDER — MEROPENEM 1 G/30ML
1000 INJECTION INTRAVENOUS ONCE
Refills: 0 | Status: COMPLETED | OUTPATIENT
Start: 2019-01-01 | End: 2019-01-01

## 2019-01-01 RX ORDER — DEXTROSE 50 % IN WATER 50 %
15 SYRINGE (ML) INTRAVENOUS ONCE
Refills: 0 | Status: DISCONTINUED | OUTPATIENT
Start: 2019-01-01 | End: 2019-01-01

## 2019-01-01 RX ORDER — POLYETHYLENE GLYCOL 3350 17 G/17G
17 POWDER, FOR SOLUTION ORAL DAILY
Refills: 0 | Status: DISCONTINUED | OUTPATIENT
Start: 2019-01-01 | End: 2019-01-01

## 2019-01-01 RX ORDER — SENNA PLUS 8.6 MG/1
1 TABLET ORAL DAILY
Refills: 0 | Status: DISCONTINUED | OUTPATIENT
Start: 2019-01-01 | End: 2019-01-01

## 2019-01-01 RX ORDER — HYDROMORPHONE HYDROCHLORIDE 2 MG/ML
0.5 INJECTION INTRAMUSCULAR; INTRAVENOUS; SUBCUTANEOUS ONCE
Refills: 0 | Status: DISCONTINUED | OUTPATIENT
Start: 2019-01-01 | End: 2019-01-01

## 2019-01-01 RX ORDER — CHLORHEXIDINE GLUCONATE 213 G/1000ML
1 SOLUTION TOPICAL
Refills: 0 | Status: DISCONTINUED | OUTPATIENT
Start: 2019-01-01 | End: 2019-01-01

## 2019-01-01 RX ORDER — PANTOPRAZOLE SODIUM 20 MG/1
40 TABLET, DELAYED RELEASE ORAL
Refills: 0 | Status: DISCONTINUED | OUTPATIENT
Start: 2019-01-01 | End: 2019-01-01

## 2019-01-01 RX ORDER — VANCOMYCIN HCL 1 G
1250 VIAL (EA) INTRAVENOUS EVERY 24 HOURS
Refills: 0 | Status: DISCONTINUED | OUTPATIENT
Start: 2019-01-01 | End: 2019-01-01

## 2019-01-01 RX ORDER — PANTOPRAZOLE SODIUM 20 MG/1
40 TABLET, DELAYED RELEASE ORAL EVERY 24 HOURS
Refills: 0 | Status: DISCONTINUED | OUTPATIENT
Start: 2019-01-01 | End: 2019-01-01

## 2019-01-01 RX ORDER — INSULIN LISPRO 100/ML
VIAL (ML) SUBCUTANEOUS EVERY 6 HOURS
Refills: 0 | Status: DISCONTINUED | OUTPATIENT
Start: 2019-01-01 | End: 2019-01-01

## 2019-01-01 RX ORDER — SENNA PLUS 8.6 MG/1
2 TABLET ORAL AT BEDTIME
Refills: 0 | Status: DISCONTINUED | OUTPATIENT
Start: 2019-01-01 | End: 2019-01-01

## 2019-01-01 RX ORDER — ALBUMIN HUMAN 25 %
50 VIAL (ML) INTRAVENOUS EVERY 6 HOURS
Refills: 0 | Status: COMPLETED | OUTPATIENT
Start: 2019-01-01 | End: 2019-01-01

## 2019-01-01 RX ORDER — HYDROMORPHONE HYDROCHLORIDE 2 MG/ML
1 INJECTION INTRAMUSCULAR; INTRAVENOUS; SUBCUTANEOUS ONCE
Refills: 0 | Status: DISCONTINUED | OUTPATIENT
Start: 2019-01-01 | End: 2019-01-01

## 2019-01-01 RX ORDER — ALBUMIN HUMAN 25 %
500 VIAL (ML) INTRAVENOUS ONCE
Refills: 0 | Status: COMPLETED | OUTPATIENT
Start: 2019-01-01 | End: 2019-01-01

## 2019-01-01 RX ORDER — MAGNESIUM SULFATE 500 MG/ML
2 VIAL (ML) INJECTION ONCE
Refills: 0 | Status: DISCONTINUED | OUTPATIENT
Start: 2019-01-01 | End: 2019-01-01

## 2019-01-01 RX ORDER — MORPHINE SULFATE 50 MG/1
2 CAPSULE, EXTENDED RELEASE ORAL ONCE
Refills: 0 | Status: DISCONTINUED | OUTPATIENT
Start: 2019-01-01 | End: 2019-01-01

## 2019-01-01 RX ORDER — AMIODARONE HYDROCHLORIDE 400 MG/1
200 TABLET ORAL DAILY
Refills: 0 | Status: DISCONTINUED | OUTPATIENT
Start: 2019-01-01 | End: 2019-01-01

## 2019-01-01 RX ORDER — KETAMINE HYDROCHLORIDE 100 MG/ML
20 INJECTION INTRAMUSCULAR; INTRAVENOUS ONCE
Refills: 0 | Status: DISCONTINUED | OUTPATIENT
Start: 2019-01-01 | End: 2019-01-01

## 2019-01-01 RX ORDER — IPRATROPIUM/ALBUTEROL SULFATE 18-103MCG
3 AEROSOL WITH ADAPTER (GRAM) INHALATION EVERY 6 HOURS
Refills: 0 | Status: DISCONTINUED | OUTPATIENT
Start: 2019-01-01 | End: 2019-01-01

## 2019-01-01 RX ORDER — SODIUM CHLORIDE 9 MG/ML
500 INJECTION, SOLUTION INTRAVENOUS ONCE
Refills: 0 | Status: COMPLETED | OUTPATIENT
Start: 2019-01-01 | End: 2019-01-01

## 2019-01-01 RX ORDER — ACETAMINOPHEN 500 MG
1000 TABLET ORAL ONCE
Refills: 0 | Status: COMPLETED | OUTPATIENT
Start: 2019-01-01 | End: 2019-01-01

## 2019-01-01 RX ORDER — METOPROLOL TARTRATE 50 MG
12.5 TABLET ORAL EVERY 12 HOURS
Refills: 0 | Status: DISCONTINUED | OUTPATIENT
Start: 2019-01-01 | End: 2019-01-01

## 2019-01-01 RX ORDER — INSULIN HUMAN 100 [IU]/ML
8 INJECTION, SOLUTION SUBCUTANEOUS ONCE
Refills: 0 | Status: COMPLETED | OUTPATIENT
Start: 2019-01-01 | End: 2019-01-01

## 2019-01-01 RX ORDER — VANCOMYCIN HCL 1 G
1500 VIAL (EA) INTRAVENOUS ONCE
Refills: 0 | Status: COMPLETED | OUTPATIENT
Start: 2019-01-01 | End: 2019-01-01

## 2019-01-01 RX ORDER — POTASSIUM CHLORIDE 20 MEQ
60 PACKET (EA) ORAL ONCE
Refills: 0 | Status: COMPLETED | OUTPATIENT
Start: 2019-01-01 | End: 2019-01-01

## 2019-01-01 RX ORDER — ALBUMIN HUMAN 25 %
50 VIAL (ML) INTRAVENOUS ONCE
Refills: 0 | Status: COMPLETED | OUTPATIENT
Start: 2019-01-01 | End: 2019-01-01

## 2019-01-01 RX ORDER — ACETAMINOPHEN 500 MG
650 TABLET ORAL EVERY 6 HOURS
Refills: 0 | Status: DISCONTINUED | OUTPATIENT
Start: 2019-01-01 | End: 2019-01-01

## 2019-01-01 RX ORDER — PIPERACILLIN AND TAZOBACTAM 4; .5 G/20ML; G/20ML
2.25 INJECTION, POWDER, LYOPHILIZED, FOR SOLUTION INTRAVENOUS EVERY 8 HOURS
Refills: 0 | Status: DISCONTINUED | OUTPATIENT
Start: 2019-01-01 | End: 2019-01-01

## 2019-01-01 RX ORDER — AMIODARONE HYDROCHLORIDE 400 MG/1
150 TABLET ORAL ONCE
Refills: 0 | Status: COMPLETED | OUTPATIENT
Start: 2019-01-01 | End: 2019-01-01

## 2019-01-01 RX ORDER — IPRATROPIUM/ALBUTEROL SULFATE 18-103MCG
3 AEROSOL WITH ADAPTER (GRAM) INHALATION
Refills: 0 | Status: COMPLETED | OUTPATIENT
Start: 2019-01-01 | End: 2019-01-01

## 2019-01-01 RX ORDER — METOPROLOL TARTRATE 50 MG
2.5 TABLET ORAL EVERY 6 HOURS
Refills: 0 | Status: DISCONTINUED | OUTPATIENT
Start: 2019-01-01 | End: 2019-01-01

## 2019-01-01 RX ADMIN — PANTOPRAZOLE SODIUM 40 MILLIGRAM(S): 20 TABLET, DELAYED RELEASE ORAL at 12:31

## 2019-01-01 RX ADMIN — Medication 650 MILLIGRAM(S): at 10:46

## 2019-01-01 RX ADMIN — CHLORHEXIDINE GLUCONATE 1 APPLICATION(S): 213 SOLUTION TOPICAL at 06:39

## 2019-01-01 RX ADMIN — HEPARIN SODIUM 7500 UNIT(S): 5000 INJECTION INTRAVENOUS; SUBCUTANEOUS at 06:46

## 2019-01-01 RX ADMIN — HEPARIN SODIUM 7500 UNIT(S): 5000 INJECTION INTRAVENOUS; SUBCUTANEOUS at 21:52

## 2019-01-01 RX ADMIN — Medication 50 MILLIEQUIVALENT(S): at 03:54

## 2019-01-01 RX ADMIN — Medication 3 MILLILITER(S): at 17:34

## 2019-01-01 RX ADMIN — Medication 1000 MILLIGRAM(S): at 01:21

## 2019-01-01 RX ADMIN — FENTANYL CITRATE 4.79 MICROGRAM(S)/KG/HR: 50 INJECTION INTRAVENOUS at 17:07

## 2019-01-01 RX ADMIN — SODIUM CHLORIDE 1500 MILLILITER(S): 9 INJECTION, SOLUTION INTRAVENOUS at 16:12

## 2019-01-01 RX ADMIN — POLYETHYLENE GLYCOL 3350 17 GRAM(S): 17 POWDER, FOR SOLUTION ORAL at 11:48

## 2019-01-01 RX ADMIN — MEROPENEM 100 MILLIGRAM(S): 1 INJECTION INTRAVENOUS at 18:00

## 2019-01-01 RX ADMIN — Medication 650 MILLIGRAM(S): at 08:26

## 2019-01-01 RX ADMIN — Medication 3 MILLILITER(S): at 00:34

## 2019-01-01 RX ADMIN — PROPOFOL 2.87 MICROGRAM(S)/KG/MIN: 10 INJECTION, EMULSION INTRAVENOUS at 12:13

## 2019-01-01 RX ADMIN — Medication 140 MILLIGRAM(S): at 20:10

## 2019-01-01 RX ADMIN — Medication 50 MILLILITER(S): at 11:14

## 2019-01-01 RX ADMIN — CHLORHEXIDINE GLUCONATE 15 MILLILITER(S): 213 SOLUTION TOPICAL at 06:33

## 2019-01-01 RX ADMIN — Medication 650 MILLIGRAM(S): at 22:21

## 2019-01-01 RX ADMIN — AMIODARONE HYDROCHLORIDE 200 MILLIGRAM(S): 400 TABLET ORAL at 12:19

## 2019-01-01 RX ADMIN — MEROPENEM 100 MILLIGRAM(S): 1 INJECTION INTRAVENOUS at 06:45

## 2019-01-01 RX ADMIN — PROPOFOL 2.87 MICROGRAM(S)/KG/MIN: 10 INJECTION, EMULSION INTRAVENOUS at 14:20

## 2019-01-01 RX ADMIN — Medication 650 MILLIGRAM(S): at 13:15

## 2019-01-01 RX ADMIN — QUETIAPINE FUMARATE 50 MILLIGRAM(S): 200 TABLET, FILM COATED ORAL at 06:26

## 2019-01-01 RX ADMIN — SODIUM CHLORIDE 130 MILLILITER(S): 9 INJECTION INTRAMUSCULAR; INTRAVENOUS; SUBCUTANEOUS at 22:39

## 2019-01-01 RX ADMIN — MORPHINE SULFATE 2 MILLIGRAM(S): 50 CAPSULE, EXTENDED RELEASE ORAL at 16:56

## 2019-01-01 RX ADMIN — FENTANYL CITRATE 4.79 MICROGRAM(S)/KG/HR: 50 INJECTION INTRAVENOUS at 09:59

## 2019-01-01 RX ADMIN — Medication 2.5 MILLIGRAM(S): at 00:23

## 2019-01-01 RX ADMIN — Medication 2.5 MILLIGRAM(S): at 12:30

## 2019-01-01 RX ADMIN — Medication 2.5 MILLIGRAM(S): at 14:23

## 2019-01-01 RX ADMIN — Medication 3 MILLILITER(S): at 06:34

## 2019-01-01 RX ADMIN — Medication 400 MILLIGRAM(S): at 01:07

## 2019-01-01 RX ADMIN — CHLORHEXIDINE GLUCONATE 15 MILLILITER(S): 213 SOLUTION TOPICAL at 21:15

## 2019-01-01 RX ADMIN — PANTOPRAZOLE SODIUM 40 MILLIGRAM(S): 20 TABLET, DELAYED RELEASE ORAL at 12:19

## 2019-01-01 RX ADMIN — Medication 2.5 MILLIGRAM(S): at 07:01

## 2019-01-01 RX ADMIN — HEPARIN SODIUM 7500 UNIT(S): 5000 INJECTION INTRAVENOUS; SUBCUTANEOUS at 14:56

## 2019-01-01 RX ADMIN — Medication 650 MILLIGRAM(S): at 18:51

## 2019-01-01 RX ADMIN — Medication 650 MILLIGRAM(S): at 06:13

## 2019-01-01 RX ADMIN — Medication 8.97 MICROGRAM(S)/KG/MIN: at 22:17

## 2019-01-01 RX ADMIN — MEROPENEM 100 MILLIGRAM(S): 1 INJECTION INTRAVENOUS at 17:51

## 2019-01-01 RX ADMIN — AMIODARONE HYDROCHLORIDE 200 MILLIGRAM(S): 400 TABLET ORAL at 21:52

## 2019-01-01 RX ADMIN — PIPERACILLIN AND TAZOBACTAM 200 GRAM(S): 4; .5 INJECTION, POWDER, LYOPHILIZED, FOR SOLUTION INTRAVENOUS at 22:17

## 2019-01-01 RX ADMIN — MEROPENEM 100 MILLIGRAM(S): 1 INJECTION INTRAVENOUS at 05:56

## 2019-01-01 RX ADMIN — PROPOFOL 2.87 MICROGRAM(S)/KG/MIN: 10 INJECTION, EMULSION INTRAVENOUS at 06:02

## 2019-01-01 RX ADMIN — SENNA PLUS 1 TABLET(S): 8.6 TABLET ORAL at 22:34

## 2019-01-01 RX ADMIN — SODIUM CHLORIDE 1000 MILLILITER(S): 9 INJECTION, SOLUTION INTRAVENOUS at 17:42

## 2019-01-01 RX ADMIN — Medication 650 MILLIGRAM(S): at 01:28

## 2019-01-01 RX ADMIN — MORPHINE SULFATE 2 MILLIGRAM(S): 50 CAPSULE, EXTENDED RELEASE ORAL at 17:28

## 2019-01-01 RX ADMIN — Medication 100 MILLIEQUIVALENT(S): at 17:40

## 2019-01-01 RX ADMIN — Medication 650 MILLIGRAM(S): at 11:00

## 2019-01-01 RX ADMIN — PIPERACILLIN AND TAZOBACTAM 200 GRAM(S): 4; .5 INJECTION, POWDER, LYOPHILIZED, FOR SOLUTION INTRAVENOUS at 07:15

## 2019-01-01 RX ADMIN — CHLORHEXIDINE GLUCONATE 15 MILLILITER(S): 213 SOLUTION TOPICAL at 09:57

## 2019-01-01 RX ADMIN — MORPHINE SULFATE 1 MG/HR: 50 CAPSULE, EXTENDED RELEASE ORAL at 23:35

## 2019-01-01 RX ADMIN — FENTANYL CITRATE 4.79 MICROGRAM(S)/KG/HR: 50 INJECTION INTRAVENOUS at 14:04

## 2019-01-01 RX ADMIN — Medication 300 MILLIGRAM(S): at 12:13

## 2019-01-01 RX ADMIN — Medication 3 MILLILITER(S): at 06:18

## 2019-01-01 RX ADMIN — Medication 3 MILLILITER(S): at 18:08

## 2019-01-01 RX ADMIN — Medication 2: at 18:54

## 2019-01-01 RX ADMIN — Medication 2: at 17:56

## 2019-01-01 RX ADMIN — HEPARIN SODIUM 7500 UNIT(S): 5000 INJECTION INTRAVENOUS; SUBCUTANEOUS at 21:17

## 2019-01-01 RX ADMIN — MEROPENEM 100 MILLIGRAM(S): 1 INJECTION INTRAVENOUS at 06:56

## 2019-01-01 RX ADMIN — Medication 3 MILLILITER(S): at 12:56

## 2019-01-01 RX ADMIN — Medication 1300 MILLIGRAM(S): at 22:40

## 2019-01-01 RX ADMIN — Medication 166.67 MILLIGRAM(S): at 13:42

## 2019-01-01 RX ADMIN — CHLORHEXIDINE GLUCONATE 15 MILLILITER(S): 213 SOLUTION TOPICAL at 10:42

## 2019-01-01 RX ADMIN — FENTANYL CITRATE 4.79 MICROGRAM(S)/KG/HR: 50 INJECTION INTRAVENOUS at 16:48

## 2019-01-01 RX ADMIN — POLYETHYLENE GLYCOL 3350 17 GRAM(S): 17 POWDER, FOR SOLUTION ORAL at 12:27

## 2019-01-01 RX ADMIN — Medication 2.5 MILLIGRAM(S): at 06:24

## 2019-01-01 RX ADMIN — Medication 50 GRAM(S): at 06:54

## 2019-01-01 RX ADMIN — Medication 2: at 17:51

## 2019-01-01 RX ADMIN — HEPARIN SODIUM 7500 UNIT(S): 5000 INJECTION INTRAVENOUS; SUBCUTANEOUS at 06:25

## 2019-01-01 RX ADMIN — SODIUM CHLORIDE 1000 MILLILITER(S): 9 INJECTION, SOLUTION INTRAVENOUS at 14:03

## 2019-01-01 RX ADMIN — AMIODARONE HYDROCHLORIDE 600 MILLIGRAM(S): 400 TABLET ORAL at 00:43

## 2019-01-01 RX ADMIN — CHLORHEXIDINE GLUCONATE 15 MILLILITER(S): 213 SOLUTION TOPICAL at 10:37

## 2019-01-01 RX ADMIN — PANTOPRAZOLE SODIUM 40 MILLIGRAM(S): 20 TABLET, DELAYED RELEASE ORAL at 16:21

## 2019-01-01 RX ADMIN — Medication 2.5 MILLIGRAM(S): at 06:00

## 2019-01-01 RX ADMIN — Medication 100 MILLIEQUIVALENT(S): at 03:10

## 2019-01-01 RX ADMIN — HEPARIN SODIUM 7500 UNIT(S): 5000 INJECTION INTRAVENOUS; SUBCUTANEOUS at 21:30

## 2019-01-01 RX ADMIN — Medication 650 MILLIGRAM(S): at 07:13

## 2019-01-01 RX ADMIN — HALOPERIDOL DECANOATE 2 MILLIGRAM(S): 100 INJECTION INTRAMUSCULAR at 10:55

## 2019-01-01 RX ADMIN — HYDROMORPHONE HYDROCHLORIDE 1 MILLIGRAM(S): 2 INJECTION INTRAMUSCULAR; INTRAVENOUS; SUBCUTANEOUS at 19:40

## 2019-01-01 RX ADMIN — Medication 1300 MILLIGRAM(S): at 06:34

## 2019-01-01 RX ADMIN — Medication 3 MILLILITER(S): at 05:49

## 2019-01-01 RX ADMIN — Medication 650 MILLIGRAM(S): at 14:06

## 2019-01-01 RX ADMIN — MEROPENEM 100 MILLIGRAM(S): 1 INJECTION INTRAVENOUS at 18:03

## 2019-01-01 RX ADMIN — Medication 2 MILLIGRAM(S): at 15:00

## 2019-01-01 RX ADMIN — Medication 2: at 11:20

## 2019-01-01 RX ADMIN — HEPARIN SODIUM 7500 UNIT(S): 5000 INJECTION INTRAVENOUS; SUBCUTANEOUS at 00:23

## 2019-01-01 RX ADMIN — QUETIAPINE FUMARATE 25 MILLIGRAM(S): 200 TABLET, FILM COATED ORAL at 17:16

## 2019-01-01 RX ADMIN — MEROPENEM 100 MILLIGRAM(S): 1 INJECTION INTRAVENOUS at 06:12

## 2019-01-01 RX ADMIN — Medication 2: at 07:56

## 2019-01-01 RX ADMIN — Medication 8.97 MICROGRAM(S)/KG/MIN: at 11:21

## 2019-01-01 RX ADMIN — Medication 650 MILLIGRAM(S): at 19:40

## 2019-01-01 RX ADMIN — Medication 3 MILLILITER(S): at 11:46

## 2019-01-01 RX ADMIN — METHADONE HYDROCHLORIDE 5 MILLIGRAM(S): 40 TABLET ORAL at 12:31

## 2019-01-01 RX ADMIN — CHLORHEXIDINE GLUCONATE 1 APPLICATION(S): 213 SOLUTION TOPICAL at 05:28

## 2019-01-01 RX ADMIN — CHLORHEXIDINE GLUCONATE 15 MILLILITER(S): 213 SOLUTION TOPICAL at 22:42

## 2019-01-01 RX ADMIN — Medication 2.5 MILLIGRAM(S): at 18:25

## 2019-01-01 RX ADMIN — HEPARIN SODIUM 7500 UNIT(S): 5000 INJECTION INTRAVENOUS; SUBCUTANEOUS at 21:49

## 2019-01-01 RX ADMIN — Medication 4: at 17:51

## 2019-01-01 RX ADMIN — CHLORHEXIDINE GLUCONATE 1 APPLICATION(S): 213 SOLUTION TOPICAL at 05:56

## 2019-01-01 RX ADMIN — FENTANYL CITRATE 50 MICROGRAM(S): 50 INJECTION INTRAVENOUS at 21:32

## 2019-01-01 RX ADMIN — Medication 3 MILLILITER(S): at 11:03

## 2019-01-01 RX ADMIN — Medication 3 MILLILITER(S): at 12:21

## 2019-01-01 RX ADMIN — FENTANYL CITRATE 4.79 MICROGRAM(S)/KG/HR: 50 INJECTION INTRAVENOUS at 00:26

## 2019-01-01 RX ADMIN — MEROPENEM 100 MILLIGRAM(S): 1 INJECTION INTRAVENOUS at 06:33

## 2019-01-01 RX ADMIN — Medication 3 MILLILITER(S): at 23:44

## 2019-01-01 RX ADMIN — QUETIAPINE FUMARATE 50 MILLIGRAM(S): 200 TABLET, FILM COATED ORAL at 18:51

## 2019-01-01 RX ADMIN — Medication 2: at 23:56

## 2019-01-01 RX ADMIN — Medication 2.5 MILLIGRAM(S): at 22:33

## 2019-01-01 RX ADMIN — Medication 3 MILLILITER(S): at 11:47

## 2019-01-01 RX ADMIN — Medication 250 MILLIGRAM(S): at 01:26

## 2019-01-01 RX ADMIN — Medication 2: at 07:36

## 2019-01-01 RX ADMIN — Medication 3 MILLILITER(S): at 06:14

## 2019-01-01 RX ADMIN — HEPARIN SODIUM 7500 UNIT(S): 5000 INJECTION INTRAVENOUS; SUBCUTANEOUS at 13:01

## 2019-01-01 RX ADMIN — Medication 8.97 MICROGRAM(S)/KG/MIN: at 06:26

## 2019-01-01 RX ADMIN — Medication 2: at 17:15

## 2019-01-01 RX ADMIN — MEROPENEM 100 MILLIGRAM(S): 1 INJECTION INTRAVENOUS at 07:29

## 2019-01-01 RX ADMIN — Medication 100 MILLIEQUIVALENT(S): at 02:11

## 2019-01-01 RX ADMIN — Medication 50 MILLILITER(S): at 12:08

## 2019-01-01 RX ADMIN — PROPOFOL 2.87 MICROGRAM(S)/KG/MIN: 10 INJECTION, EMULSION INTRAVENOUS at 16:49

## 2019-01-01 RX ADMIN — PROPOFOL 2.87 MICROGRAM(S)/KG/MIN: 10 INJECTION, EMULSION INTRAVENOUS at 14:53

## 2019-01-01 RX ADMIN — Medication 650 MILLIGRAM(S): at 07:16

## 2019-01-01 RX ADMIN — QUETIAPINE FUMARATE 25 MILLIGRAM(S): 200 TABLET, FILM COATED ORAL at 06:13

## 2019-01-01 RX ADMIN — POLYETHYLENE GLYCOL 3350 17 GRAM(S): 17 POWDER, FOR SOLUTION ORAL at 17:47

## 2019-01-01 RX ADMIN — QUETIAPINE FUMARATE 50 MILLIGRAM(S): 200 TABLET, FILM COATED ORAL at 06:25

## 2019-01-01 RX ADMIN — Medication 650 MILLIGRAM(S): at 16:03

## 2019-01-01 RX ADMIN — CHLORHEXIDINE GLUCONATE 15 MILLILITER(S): 213 SOLUTION TOPICAL at 21:49

## 2019-01-01 RX ADMIN — Medication 8.97 MICROGRAM(S)/KG/MIN: at 20:33

## 2019-01-01 RX ADMIN — PROPOFOL 2.87 MICROGRAM(S)/KG/MIN: 10 INJECTION, EMULSION INTRAVENOUS at 21:52

## 2019-01-01 RX ADMIN — PIPERACILLIN AND TAZOBACTAM 200 GRAM(S): 4; .5 INJECTION, POWDER, LYOPHILIZED, FOR SOLUTION INTRAVENOUS at 20:45

## 2019-01-01 RX ADMIN — Medication 650 MILLIGRAM(S): at 21:23

## 2019-01-01 RX ADMIN — HEPARIN SODIUM 7500 UNIT(S): 5000 INJECTION INTRAVENOUS; SUBCUTANEOUS at 22:06

## 2019-01-01 RX ADMIN — CHLORHEXIDINE GLUCONATE 15 MILLILITER(S): 213 SOLUTION TOPICAL at 21:32

## 2019-01-01 RX ADMIN — PANTOPRAZOLE SODIUM 40 MILLIGRAM(S): 20 TABLET, DELAYED RELEASE ORAL at 12:12

## 2019-01-01 RX ADMIN — Medication 4: at 07:01

## 2019-01-01 RX ADMIN — PROPOFOL 2.87 MICROGRAM(S)/KG/MIN: 10 INJECTION, EMULSION INTRAVENOUS at 18:33

## 2019-01-01 RX ADMIN — METHADONE HYDROCHLORIDE 5 MILLIGRAM(S): 40 TABLET ORAL at 12:19

## 2019-01-01 RX ADMIN — HYDROMORPHONE HYDROCHLORIDE 0.5 MILLIGRAM(S): 2 INJECTION INTRAMUSCULAR; INTRAVENOUS; SUBCUTANEOUS at 18:55

## 2019-01-01 RX ADMIN — Medication 50 MILLILITER(S): at 18:30

## 2019-01-01 RX ADMIN — POLYETHYLENE GLYCOL 3350 17 GRAM(S): 17 POWDER, FOR SOLUTION ORAL at 15:05

## 2019-01-01 RX ADMIN — Medication 8.97 MICROGRAM(S)/KG/MIN: at 10:41

## 2019-01-01 RX ADMIN — Medication 650 MILLIGRAM(S): at 15:00

## 2019-01-01 RX ADMIN — FENTANYL CITRATE 4.79 MICROGRAM(S)/KG/HR: 50 INJECTION INTRAVENOUS at 01:10

## 2019-01-01 RX ADMIN — Medication 650 MILLIGRAM(S): at 13:37

## 2019-01-01 RX ADMIN — Medication 3 MILLILITER(S): at 05:41

## 2019-01-01 RX ADMIN — Medication 1300 MILLIGRAM(S): at 23:28

## 2019-01-01 RX ADMIN — MORPHINE SULFATE 1 MG/HR: 50 CAPSULE, EXTENDED RELEASE ORAL at 13:55

## 2019-01-01 RX ADMIN — CHLORHEXIDINE GLUCONATE 1 APPLICATION(S): 213 SOLUTION TOPICAL at 06:46

## 2019-01-01 RX ADMIN — HEPARIN SODIUM 7500 UNIT(S): 5000 INJECTION INTRAVENOUS; SUBCUTANEOUS at 07:29

## 2019-01-01 RX ADMIN — CHLORHEXIDINE GLUCONATE 1 APPLICATION(S): 213 SOLUTION TOPICAL at 07:02

## 2019-01-01 RX ADMIN — Medication 8.97 MICROGRAM(S)/KG/MIN: at 05:45

## 2019-01-01 RX ADMIN — AMIODARONE HYDROCHLORIDE 200 MILLIGRAM(S): 400 TABLET ORAL at 06:34

## 2019-01-01 RX ADMIN — Medication 80 MILLIGRAM(S): at 06:00

## 2019-01-01 RX ADMIN — SODIUM CHLORIDE 100 MILLILITER(S): 9 INJECTION, SOLUTION INTRAVENOUS at 20:46

## 2019-01-01 RX ADMIN — Medication 8.97 MICROGRAM(S)/KG/MIN: at 14:04

## 2019-01-01 RX ADMIN — AMIODARONE HYDROCHLORIDE 200 MILLIGRAM(S): 400 TABLET ORAL at 21:16

## 2019-01-01 RX ADMIN — SODIUM CHLORIDE 1000 MILLILITER(S): 9 INJECTION, SOLUTION INTRAVENOUS at 18:00

## 2019-01-01 RX ADMIN — MORPHINE SULFATE 2 MILLIGRAM(S): 50 CAPSULE, EXTENDED RELEASE ORAL at 16:43

## 2019-01-01 RX ADMIN — Medication 60 MILLIEQUIVALENT(S): at 01:06

## 2019-01-01 RX ADMIN — Medication 8.97 MICROGRAM(S)/KG/MIN: at 22:44

## 2019-01-01 RX ADMIN — Medication 3 MILLILITER(S): at 18:03

## 2019-01-01 RX ADMIN — AMIODARONE HYDROCHLORIDE 200 MILLIGRAM(S): 400 TABLET ORAL at 21:15

## 2019-01-01 RX ADMIN — Medication 3 MILLILITER(S): at 17:50

## 2019-01-01 RX ADMIN — CHLORHEXIDINE GLUCONATE 15 MILLILITER(S): 213 SOLUTION TOPICAL at 23:07

## 2019-01-01 RX ADMIN — AMIODARONE HYDROCHLORIDE 200 MILLIGRAM(S): 400 TABLET ORAL at 21:32

## 2019-01-01 RX ADMIN — Medication 650 MILLIGRAM(S): at 13:47

## 2019-01-01 RX ADMIN — Medication 20 MILLIEQUIVALENT(S): at 07:01

## 2019-01-01 RX ADMIN — Medication 3 MILLILITER(S): at 02:06

## 2019-01-01 RX ADMIN — Medication 3 MILLILITER(S): at 10:23

## 2019-01-01 RX ADMIN — INSULIN HUMAN 8 UNIT(S): 100 INJECTION, SOLUTION SUBCUTANEOUS at 12:08

## 2019-01-01 RX ADMIN — Medication 650 MILLIGRAM(S): at 08:27

## 2019-01-01 RX ADMIN — FENTANYL CITRATE 4.79 MICROGRAM(S)/KG/HR: 50 INJECTION INTRAVENOUS at 10:43

## 2019-01-01 RX ADMIN — POLYETHYLENE GLYCOL 3350 17 GRAM(S): 17 POWDER, FOR SOLUTION ORAL at 12:13

## 2019-01-01 RX ADMIN — HEPARIN SODIUM 7500 UNIT(S): 5000 INJECTION INTRAVENOUS; SUBCUTANEOUS at 14:53

## 2019-01-01 RX ADMIN — HYDROMORPHONE HYDROCHLORIDE 0.5 MILLIGRAM(S): 2 INJECTION INTRAMUSCULAR; INTRAVENOUS; SUBCUTANEOUS at 23:06

## 2019-01-01 RX ADMIN — SENNA PLUS 1 TABLET(S): 8.6 TABLET ORAL at 17:47

## 2019-01-01 RX ADMIN — HEPARIN SODIUM 7500 UNIT(S): 5000 INJECTION INTRAVENOUS; SUBCUTANEOUS at 05:44

## 2019-01-01 RX ADMIN — Medication 650 MILLIGRAM(S): at 15:05

## 2019-01-01 RX ADMIN — SODIUM CHLORIDE 66.67 MILLILITER(S): 9 INJECTION INTRAMUSCULAR; INTRAVENOUS; SUBCUTANEOUS at 17:45

## 2019-01-01 RX ADMIN — MEROPENEM 100 MILLIGRAM(S): 1 INJECTION INTRAVENOUS at 05:59

## 2019-01-01 RX ADMIN — Medication 3 MILLILITER(S): at 19:22

## 2019-01-01 RX ADMIN — Medication 100 GRAM(S): at 09:46

## 2019-01-01 RX ADMIN — Medication 8.97 MICROGRAM(S)/KG/MIN: at 14:21

## 2019-01-01 RX ADMIN — QUETIAPINE FUMARATE 25 MILLIGRAM(S): 200 TABLET, FILM COATED ORAL at 18:02

## 2019-01-01 RX ADMIN — CHLORHEXIDINE GLUCONATE 15 MILLILITER(S): 213 SOLUTION TOPICAL at 10:43

## 2019-01-01 RX ADMIN — MEROPENEM 100 MILLIGRAM(S): 1 INJECTION INTRAVENOUS at 07:54

## 2019-01-01 RX ADMIN — HEPARIN SODIUM 7500 UNIT(S): 5000 INJECTION INTRAVENOUS; SUBCUTANEOUS at 21:14

## 2019-01-01 RX ADMIN — HEPARIN SODIUM 7500 UNIT(S): 5000 INJECTION INTRAVENOUS; SUBCUTANEOUS at 23:28

## 2019-01-01 RX ADMIN — HEPARIN SODIUM 7500 UNIT(S): 5000 INJECTION INTRAVENOUS; SUBCUTANEOUS at 07:15

## 2019-01-01 RX ADMIN — Medication 50 MILLIEQUIVALENT(S): at 01:46

## 2019-01-01 RX ADMIN — PANTOPRAZOLE SODIUM 40 MILLIGRAM(S): 20 TABLET, DELAYED RELEASE ORAL at 12:29

## 2019-01-01 RX ADMIN — PROPOFOL 2.87 MICROGRAM(S)/KG/MIN: 10 INJECTION, EMULSION INTRAVENOUS at 09:12

## 2019-01-01 RX ADMIN — Medication 100 GRAM(S): at 07:12

## 2019-01-01 RX ADMIN — PIPERACILLIN AND TAZOBACTAM 200 GRAM(S): 4; .5 INJECTION, POWDER, LYOPHILIZED, FOR SOLUTION INTRAVENOUS at 13:43

## 2019-01-01 RX ADMIN — Medication 3 MILLILITER(S): at 12:34

## 2019-01-01 RX ADMIN — PROPOFOL 2.87 MICROGRAM(S)/KG/MIN: 10 INJECTION, EMULSION INTRAVENOUS at 19:17

## 2019-01-01 RX ADMIN — Medication 50 GRAM(S): at 01:06

## 2019-01-01 RX ADMIN — Medication 2: at 23:33

## 2019-01-01 RX ADMIN — Medication 80 MILLIGRAM(S): at 15:06

## 2019-01-01 RX ADMIN — Medication 3 MILLILITER(S): at 01:52

## 2019-01-01 RX ADMIN — Medication 3 MILLILITER(S): at 05:53

## 2019-01-01 RX ADMIN — Medication 8.97 MICROGRAM(S)/KG/MIN: at 22:40

## 2019-01-01 RX ADMIN — MIDAZOLAM HYDROCHLORIDE 2 MILLIGRAM(S): 1 INJECTION, SOLUTION INTRAMUSCULAR; INTRAVENOUS at 21:55

## 2019-01-01 RX ADMIN — Medication 124 MILLIGRAM(S): at 10:38

## 2019-01-01 RX ADMIN — QUETIAPINE FUMARATE 25 MILLIGRAM(S): 200 TABLET, FILM COATED ORAL at 07:29

## 2019-01-01 RX ADMIN — CHLORHEXIDINE GLUCONATE 15 MILLILITER(S): 213 SOLUTION TOPICAL at 22:34

## 2019-01-01 RX ADMIN — QUETIAPINE FUMARATE 25 MILLIGRAM(S): 200 TABLET, FILM COATED ORAL at 15:05

## 2019-01-01 RX ADMIN — MEROPENEM 100 MILLIGRAM(S): 1 INJECTION INTRAVENOUS at 05:44

## 2019-01-01 RX ADMIN — HEPARIN SODIUM 7500 UNIT(S): 5000 INJECTION INTRAVENOUS; SUBCUTANEOUS at 07:01

## 2019-01-01 RX ADMIN — AMIODARONE HYDROCHLORIDE 200 MILLIGRAM(S): 400 TABLET ORAL at 21:49

## 2019-01-01 RX ADMIN — Medication 100 GRAM(S): at 13:45

## 2019-01-01 RX ADMIN — Medication 650 MILLIGRAM(S): at 10:07

## 2019-01-01 RX ADMIN — HYDROMORPHONE HYDROCHLORIDE 1 MILLIGRAM(S): 2 INJECTION INTRAMUSCULAR; INTRAVENOUS; SUBCUTANEOUS at 06:54

## 2019-01-01 RX ADMIN — Medication 3 MILLILITER(S): at 22:47

## 2019-01-01 RX ADMIN — HEPARIN SODIUM 7500 UNIT(S): 5000 INJECTION INTRAVENOUS; SUBCUTANEOUS at 22:42

## 2019-01-01 RX ADMIN — PROPOFOL 2.87 MICROGRAM(S)/KG/MIN: 10 INJECTION, EMULSION INTRAVENOUS at 13:00

## 2019-01-01 RX ADMIN — CHLORHEXIDINE GLUCONATE 1 APPLICATION(S): 213 SOLUTION TOPICAL at 13:00

## 2019-01-01 RX ADMIN — Medication 100 GRAM(S): at 08:43

## 2019-01-01 RX ADMIN — Medication 8.97 MICROGRAM(S)/KG/MIN: at 15:33

## 2019-01-01 RX ADMIN — KETAMINE HYDROCHLORIDE 20 MILLIGRAM(S): 100 INJECTION INTRAMUSCULAR; INTRAVENOUS at 12:56

## 2019-01-01 RX ADMIN — Medication 3 MILLILITER(S): at 06:01

## 2019-01-01 RX ADMIN — Medication 2: at 12:28

## 2019-01-01 RX ADMIN — AMIODARONE HYDROCHLORIDE 200 MILLIGRAM(S): 400 TABLET ORAL at 22:34

## 2019-01-01 RX ADMIN — Medication 1300 MILLIGRAM(S): at 06:26

## 2019-01-01 RX ADMIN — CHLORHEXIDINE GLUCONATE 15 MILLILITER(S): 213 SOLUTION TOPICAL at 10:34

## 2019-01-01 RX ADMIN — HYDROMORPHONE HYDROCHLORIDE 1 MILLIGRAM(S): 2 INJECTION INTRAMUSCULAR; INTRAVENOUS; SUBCUTANEOUS at 07:30

## 2019-01-01 RX ADMIN — Medication 2: at 19:03

## 2019-01-01 RX ADMIN — PROPOFOL 2.87 MICROGRAM(S)/KG/MIN: 10 INJECTION, EMULSION INTRAVENOUS at 15:57

## 2019-01-01 RX ADMIN — PROPOFOL 2.87 MICROGRAM(S)/KG/MIN: 10 INJECTION, EMULSION INTRAVENOUS at 12:17

## 2019-01-01 RX ADMIN — MEROPENEM 100 MILLIGRAM(S): 1 INJECTION INTRAVENOUS at 17:06

## 2019-01-01 RX ADMIN — Medication 650 MILLIGRAM(S): at 22:50

## 2019-01-01 RX ADMIN — SENNA PLUS 2 TABLET(S): 8.6 TABLET ORAL at 21:49

## 2019-01-01 RX ADMIN — MEROPENEM 100 MILLIGRAM(S): 1 INJECTION INTRAVENOUS at 17:44

## 2019-01-01 RX ADMIN — Medication 3 MILLILITER(S): at 05:24

## 2019-01-01 RX ADMIN — CHLORHEXIDINE GLUCONATE 1 APPLICATION(S): 213 SOLUTION TOPICAL at 06:26

## 2019-01-01 RX ADMIN — CHLORHEXIDINE GLUCONATE 15 MILLILITER(S): 213 SOLUTION TOPICAL at 12:27

## 2019-01-01 RX ADMIN — ONDANSETRON 4 MILLIGRAM(S): 8 TABLET, FILM COATED ORAL at 18:27

## 2019-01-01 RX ADMIN — HYDROMORPHONE HYDROCHLORIDE 0.5 MILLIGRAM(S): 2 INJECTION INTRAMUSCULAR; INTRAVENOUS; SUBCUTANEOUS at 02:18

## 2019-01-01 RX ADMIN — Medication 2: at 06:41

## 2019-01-01 RX ADMIN — CHLORHEXIDINE GLUCONATE 15 MILLILITER(S): 213 SOLUTION TOPICAL at 10:07

## 2019-01-01 RX ADMIN — Medication 100 MILLIEQUIVALENT(S): at 01:06

## 2019-01-01 RX ADMIN — Medication 3 MILLILITER(S): at 01:19

## 2019-01-01 RX ADMIN — Medication 2: at 01:16

## 2019-01-01 RX ADMIN — HYDROMORPHONE HYDROCHLORIDE 0.5 MILLIGRAM(S): 2 INJECTION INTRAMUSCULAR; INTRAVENOUS; SUBCUTANEOUS at 06:13

## 2019-01-01 RX ADMIN — HEPARIN SODIUM 7500 UNIT(S): 5000 INJECTION INTRAVENOUS; SUBCUTANEOUS at 06:36

## 2019-01-01 RX ADMIN — Medication 3 MILLILITER(S): at 23:14

## 2019-01-01 RX ADMIN — HEPARIN SODIUM 5000 UNIT(S): 5000 INJECTION INTRAVENOUS; SUBCUTANEOUS at 13:44

## 2019-01-01 RX ADMIN — MEROPENEM 100 MILLIGRAM(S): 1 INJECTION INTRAVENOUS at 18:33

## 2019-01-01 RX ADMIN — Medication 2.5 MILLIGRAM(S): at 23:00

## 2019-01-01 RX ADMIN — Medication 8.97 MICROGRAM(S)/KG/MIN: at 21:14

## 2019-01-01 RX ADMIN — PROPOFOL 2.87 MICROGRAM(S)/KG/MIN: 10 INJECTION, EMULSION INTRAVENOUS at 23:27

## 2019-01-01 RX ADMIN — Medication 50 MILLILITER(S): at 00:17

## 2019-01-01 RX ADMIN — PROPOFOL 2.87 MICROGRAM(S)/KG/MIN: 10 INJECTION, EMULSION INTRAVENOUS at 06:24

## 2019-01-01 RX ADMIN — MORPHINE SULFATE 1 MG/HR: 50 CAPSULE, EXTENDED RELEASE ORAL at 13:51

## 2019-01-01 RX ADMIN — PROPOFOL 2.87 MICROGRAM(S)/KG/MIN: 10 INJECTION, EMULSION INTRAVENOUS at 03:48

## 2019-01-01 RX ADMIN — Medication 2: at 06:36

## 2019-01-01 RX ADMIN — QUETIAPINE FUMARATE 50 MILLIGRAM(S): 200 TABLET, FILM COATED ORAL at 17:51

## 2019-01-01 RX ADMIN — Medication 3 MILLILITER(S): at 19:01

## 2019-01-01 RX ADMIN — MEROPENEM 100 MILLIGRAM(S): 1 INJECTION INTRAVENOUS at 05:27

## 2019-01-01 RX ADMIN — Medication 8.97 MICROGRAM(S)/KG/MIN: at 12:31

## 2019-01-01 RX ADMIN — Medication 2: at 13:02

## 2019-01-01 RX ADMIN — Medication 3 MILLILITER(S): at 05:34

## 2019-01-01 RX ADMIN — HEPARIN SODIUM 7500 UNIT(S): 5000 INJECTION INTRAVENOUS; SUBCUTANEOUS at 22:16

## 2019-01-01 RX ADMIN — HEPARIN SODIUM 7500 UNIT(S): 5000 INJECTION INTRAVENOUS; SUBCUTANEOUS at 13:45

## 2019-01-01 RX ADMIN — Medication 975 MILLIGRAM(S): at 05:59

## 2019-01-01 RX ADMIN — Medication 650 MILLIGRAM(S): at 12:20

## 2019-01-01 RX ADMIN — PROPOFOL 2.87 MICROGRAM(S)/KG/MIN: 10 INJECTION, EMULSION INTRAVENOUS at 10:42

## 2019-01-01 RX ADMIN — Medication 2: at 07:00

## 2019-01-01 RX ADMIN — PROPOFOL 2.87 MICROGRAM(S)/KG/MIN: 10 INJECTION, EMULSION INTRAVENOUS at 23:02

## 2019-01-01 RX ADMIN — Medication 3 MILLILITER(S): at 01:14

## 2019-01-01 RX ADMIN — PROPOFOL 2.87 MICROGRAM(S)/KG/MIN: 10 INJECTION, EMULSION INTRAVENOUS at 10:43

## 2019-01-01 RX ADMIN — PROPOFOL 2.87 MICROGRAM(S)/KG/MIN: 10 INJECTION, EMULSION INTRAVENOUS at 21:28

## 2019-01-01 RX ADMIN — Medication 650 MILLIGRAM(S): at 02:21

## 2019-01-01 RX ADMIN — Medication 3 MILLILITER(S): at 18:00

## 2019-01-01 RX ADMIN — Medication 400 MILLIGRAM(S): at 19:14

## 2019-01-01 RX ADMIN — KETAMINE HYDROCHLORIDE 30 MILLIGRAM(S): 100 INJECTION INTRAMUSCULAR; INTRAVENOUS at 11:02

## 2019-01-01 RX ADMIN — AMIODARONE HYDROCHLORIDE 200 MILLIGRAM(S): 400 TABLET ORAL at 23:07

## 2019-01-01 RX ADMIN — Medication 3 MILLILITER(S): at 10:15

## 2019-01-01 RX ADMIN — SENNA PLUS 1 TABLET(S): 8.6 TABLET ORAL at 22:42

## 2019-01-01 RX ADMIN — Medication 12.5 MILLIGRAM(S): at 21:31

## 2019-01-01 RX ADMIN — Medication 3 MILLILITER(S): at 05:32

## 2019-01-01 RX ADMIN — Medication 650 MILLIGRAM(S): at 07:15

## 2019-01-01 RX ADMIN — Medication 3 MILLILITER(S): at 09:58

## 2019-01-01 RX ADMIN — CHLORHEXIDINE GLUCONATE 1 APPLICATION(S): 213 SOLUTION TOPICAL at 06:35

## 2019-01-01 RX ADMIN — PROPOFOL 2.87 MICROGRAM(S)/KG/MIN: 10 INJECTION, EMULSION INTRAVENOUS at 22:40

## 2019-01-01 RX ADMIN — Medication 3 MILLILITER(S): at 17:31

## 2019-01-01 RX ADMIN — PROPOFOL 2.87 MICROGRAM(S)/KG/MIN: 10 INJECTION, EMULSION INTRAVENOUS at 02:19

## 2019-01-01 RX ADMIN — Medication 50 MILLILITER(S): at 03:09

## 2019-01-01 RX ADMIN — Medication 8.97 MICROGRAM(S)/KG/MIN: at 22:06

## 2019-01-01 RX ADMIN — PROPOFOL 2.87 MICROGRAM(S)/KG/MIN: 10 INJECTION, EMULSION INTRAVENOUS at 21:13

## 2019-01-01 RX ADMIN — HEPARIN SODIUM 7500 UNIT(S): 5000 INJECTION INTRAVENOUS; SUBCUTANEOUS at 14:18

## 2019-01-01 RX ADMIN — HYDROMORPHONE HYDROCHLORIDE 0.5 MILLIGRAM(S): 2 INJECTION INTRAMUSCULAR; INTRAVENOUS; SUBCUTANEOUS at 22:46

## 2019-01-01 RX ADMIN — QUETIAPINE FUMARATE 50 MILLIGRAM(S): 200 TABLET, FILM COATED ORAL at 06:02

## 2019-01-01 RX ADMIN — Medication 975 MILLIGRAM(S): at 13:36

## 2019-01-01 RX ADMIN — HYDROMORPHONE HYDROCHLORIDE 0.5 MILLIGRAM(S): 2 INJECTION INTRAMUSCULAR; INTRAVENOUS; SUBCUTANEOUS at 19:10

## 2019-01-01 RX ADMIN — Medication 3 MILLILITER(S): at 17:21

## 2019-01-01 RX ADMIN — Medication 3 MILLILITER(S): at 02:23

## 2019-01-01 RX ADMIN — Medication 150 MEQ/KG/HR: at 12:13

## 2019-01-01 RX ADMIN — CHLORHEXIDINE GLUCONATE 1 APPLICATION(S): 213 SOLUTION TOPICAL at 07:04

## 2019-01-01 RX ADMIN — AMIODARONE HYDROCHLORIDE 200 MILLIGRAM(S): 400 TABLET ORAL at 00:21

## 2019-01-01 RX ADMIN — QUETIAPINE FUMARATE 50 MILLIGRAM(S): 200 TABLET, FILM COATED ORAL at 07:00

## 2019-01-01 RX ADMIN — Medication 3 MILLILITER(S): at 10:30

## 2019-01-01 RX ADMIN — PANTOPRAZOLE SODIUM 40 MILLIGRAM(S): 20 TABLET, DELAYED RELEASE ORAL at 11:48

## 2019-01-01 RX ADMIN — Medication 3 MILLILITER(S): at 00:50

## 2019-01-01 RX ADMIN — Medication 2: at 05:55

## 2019-01-01 RX ADMIN — FENTANYL CITRATE 50 MICROGRAM(S): 50 INJECTION INTRAVENOUS at 22:10

## 2019-01-01 RX ADMIN — Medication 975 MILLIGRAM(S): at 05:56

## 2019-01-01 RX ADMIN — CEFEPIME 100 MILLIGRAM(S): 1 INJECTION, POWDER, FOR SOLUTION INTRAMUSCULAR; INTRAVENOUS at 12:08

## 2019-01-01 RX ADMIN — Medication 3 MILLILITER(S): at 02:12

## 2019-01-01 RX ADMIN — HEPARIN SODIUM 7500 UNIT(S): 5000 INJECTION INTRAVENOUS; SUBCUTANEOUS at 23:07

## 2019-01-01 RX ADMIN — Medication 8.97 MICROGRAM(S)/KG/MIN: at 22:12

## 2019-01-01 RX ADMIN — Medication 650 MILLIGRAM(S): at 07:34

## 2019-01-01 RX ADMIN — Medication 166.67 MILLIGRAM(S): at 12:19

## 2019-01-01 RX ADMIN — HYDROMORPHONE HYDROCHLORIDE 0.5 MILLIGRAM(S): 2 INJECTION INTRAMUSCULAR; INTRAVENOUS; SUBCUTANEOUS at 06:55

## 2019-01-01 RX ADMIN — HEPARIN SODIUM 7500 UNIT(S): 5000 INJECTION INTRAVENOUS; SUBCUTANEOUS at 14:15

## 2019-01-01 RX ADMIN — Medication 3 MILLILITER(S): at 23:01

## 2019-01-01 RX ADMIN — AMIODARONE HYDROCHLORIDE 200 MILLIGRAM(S): 400 TABLET ORAL at 21:46

## 2019-01-01 RX ADMIN — Medication 650 MILLIGRAM(S): at 07:04

## 2019-01-01 RX ADMIN — AMIODARONE HYDROCHLORIDE 200 MILLIGRAM(S): 400 TABLET ORAL at 22:42

## 2019-01-01 RX ADMIN — Medication 8.97 MICROGRAM(S)/KG/MIN: at 04:44

## 2019-01-01 RX ADMIN — HEPARIN SODIUM 7500 UNIT(S): 5000 INJECTION INTRAVENOUS; SUBCUTANEOUS at 06:00

## 2019-01-01 RX ADMIN — CHLORHEXIDINE GLUCONATE 15 MILLILITER(S): 213 SOLUTION TOPICAL at 21:52

## 2019-01-01 RX ADMIN — Medication 8.97 MICROGRAM(S)/KG/MIN: at 02:20

## 2019-01-01 RX ADMIN — HEPARIN SODIUM 7500 UNIT(S): 5000 INJECTION INTRAVENOUS; SUBCUTANEOUS at 06:56

## 2019-01-01 RX ADMIN — Medication 8.97 MICROGRAM(S)/KG/MIN: at 07:15

## 2019-01-01 RX ADMIN — Medication 80 MILLIGRAM(S): at 13:48

## 2019-01-01 RX ADMIN — Medication 8.97 MICROGRAM(S)/KG/MIN: at 07:41

## 2019-01-01 RX ADMIN — Medication 650 MILLIGRAM(S): at 14:00

## 2019-01-01 RX ADMIN — Medication 650 MILLIGRAM(S): at 21:49

## 2019-01-01 RX ADMIN — FENTANYL CITRATE 50 MICROGRAM(S): 50 INJECTION INTRAVENOUS at 21:54

## 2019-01-01 RX ADMIN — Medication 3 MILLILITER(S): at 07:05

## 2019-01-01 RX ADMIN — PIPERACILLIN AND TAZOBACTAM 200 GRAM(S): 4; .5 INJECTION, POWDER, LYOPHILIZED, FOR SOLUTION INTRAVENOUS at 03:17

## 2019-01-01 RX ADMIN — HEPARIN SODIUM 7500 UNIT(S): 5000 INJECTION INTRAVENOUS; SUBCUTANEOUS at 22:34

## 2019-01-01 RX ADMIN — Medication 2: at 17:47

## 2019-01-01 RX ADMIN — Medication 250 MILLILITER(S): at 23:14

## 2019-01-01 RX ADMIN — PROPOFOL 2.87 MICROGRAM(S)/KG/MIN: 10 INJECTION, EMULSION INTRAVENOUS at 18:03

## 2019-01-01 RX ADMIN — FENTANYL CITRATE 50 MICROGRAM(S): 50 INJECTION INTRAVENOUS at 21:17

## 2019-01-01 RX ADMIN — Medication 650 MILLIGRAM(S): at 13:45

## 2019-01-01 RX ADMIN — HYDROMORPHONE HYDROCHLORIDE 1 MILLIGRAM(S): 2 INJECTION INTRAMUSCULAR; INTRAVENOUS; SUBCUTANEOUS at 20:00

## 2019-01-01 RX ADMIN — MEROPENEM 100 MILLIGRAM(S): 1 INJECTION INTRAVENOUS at 18:28

## 2019-01-01 RX ADMIN — HEPARIN SODIUM 7500 UNIT(S): 5000 INJECTION INTRAVENOUS; SUBCUTANEOUS at 15:45

## 2019-01-01 RX ADMIN — MORPHINE SULFATE 2 MILLIGRAM(S): 50 CAPSULE, EXTENDED RELEASE ORAL at 17:27

## 2019-01-01 RX ADMIN — Medication 1300 MILLIGRAM(S): at 16:20

## 2019-01-01 RX ADMIN — PANTOPRAZOLE SODIUM 40 MILLIGRAM(S): 20 TABLET, DELAYED RELEASE ORAL at 12:27

## 2019-01-01 RX ADMIN — CHLORHEXIDINE GLUCONATE 15 MILLILITER(S): 213 SOLUTION TOPICAL at 21:17

## 2019-01-01 RX ADMIN — Medication 50 MILLIGRAM(S): at 01:28

## 2019-01-01 RX ADMIN — Medication 3 MILLILITER(S): at 12:55

## 2019-01-01 RX ADMIN — MEROPENEM 100 MILLIGRAM(S): 1 INJECTION INTRAVENOUS at 17:17

## 2019-01-01 RX ADMIN — Medication 2: at 12:05

## 2019-01-01 RX ADMIN — Medication 8.97 MICROGRAM(S)/KG/MIN: at 17:08

## 2019-01-01 RX ADMIN — Medication 2: at 22:07

## 2019-01-01 RX ADMIN — HYDROMORPHONE HYDROCHLORIDE 0.5 MILLIGRAM(S): 2 INJECTION INTRAMUSCULAR; INTRAVENOUS; SUBCUTANEOUS at 02:05

## 2019-01-01 RX ADMIN — PROPOFOL 2.87 MICROGRAM(S)/KG/MIN: 10 INJECTION, EMULSION INTRAVENOUS at 17:42

## 2019-01-01 RX ADMIN — HEPARIN SODIUM 7500 UNIT(S): 5000 INJECTION INTRAVENOUS; SUBCUTANEOUS at 06:34

## 2019-01-01 RX ADMIN — CHLORHEXIDINE GLUCONATE 1 APPLICATION(S): 213 SOLUTION TOPICAL at 06:21

## 2019-01-01 RX ADMIN — CHLORHEXIDINE GLUCONATE 15 MILLILITER(S): 213 SOLUTION TOPICAL at 00:22

## 2019-01-01 RX ADMIN — Medication 975 MILLIGRAM(S): at 21:31

## 2019-01-01 RX ADMIN — CHLORHEXIDINE GLUCONATE 15 MILLILITER(S): 213 SOLUTION TOPICAL at 17:46

## 2019-01-01 RX ADMIN — Medication 3 MILLILITER(S): at 17:04

## 2019-01-01 RX ADMIN — PROPOFOL 2.87 MICROGRAM(S)/KG/MIN: 10 INJECTION, EMULSION INTRAVENOUS at 01:10

## 2019-01-01 RX ADMIN — Medication 650 MILLIGRAM(S): at 06:15

## 2019-01-01 RX ADMIN — Medication 650 MILLIGRAM(S): at 01:17

## 2019-01-01 RX ADMIN — HEPARIN SODIUM 7500 UNIT(S): 5000 INJECTION INTRAVENOUS; SUBCUTANEOUS at 05:56

## 2019-01-01 RX ADMIN — SODIUM CHLORIDE 60 MILLILITER(S): 9 INJECTION, SOLUTION INTRAVENOUS at 17:47

## 2019-01-01 RX ADMIN — HEPARIN SODIUM 7500 UNIT(S): 5000 INJECTION INTRAVENOUS; SUBCUTANEOUS at 14:02

## 2019-01-01 RX ADMIN — Medication 3 MILLILITER(S): at 12:53

## 2019-01-01 RX ADMIN — Medication 650 MILLIGRAM(S): at 05:27

## 2019-01-01 RX ADMIN — METHADONE HYDROCHLORIDE 5 MILLIGRAM(S): 40 TABLET ORAL at 12:12

## 2019-01-01 RX ADMIN — Medication 2: at 19:16

## 2019-01-01 RX ADMIN — Medication 2: at 11:47

## 2019-01-01 RX ADMIN — Medication 3 MILLILITER(S): at 18:09

## 2019-01-01 RX ADMIN — QUETIAPINE FUMARATE 25 MILLIGRAM(S): 200 TABLET, FILM COATED ORAL at 17:44

## 2019-01-01 RX ADMIN — PROPOFOL 2.87 MICROGRAM(S)/KG/MIN: 10 INJECTION, EMULSION INTRAVENOUS at 22:17

## 2019-01-01 RX ADMIN — CHLORHEXIDINE GLUCONATE 15 MILLILITER(S): 213 SOLUTION TOPICAL at 09:37

## 2019-01-01 RX ADMIN — Medication 8.97 MICROGRAM(S)/KG/MIN: at 12:20

## 2019-01-01 RX ADMIN — Medication 8.97 MICROGRAM(S)/KG/MIN: at 23:33

## 2019-01-01 RX ADMIN — Medication 650 MILLIGRAM(S): at 20:00

## 2019-01-01 RX ADMIN — QUETIAPINE FUMARATE 50 MILLIGRAM(S): 200 TABLET, FILM COATED ORAL at 05:27

## 2019-01-01 RX ADMIN — POLYETHYLENE GLYCOL 3350 17 GRAM(S): 17 POWDER, FOR SOLUTION ORAL at 12:29

## 2019-01-01 RX ADMIN — CHLORHEXIDINE GLUCONATE 15 MILLILITER(S): 213 SOLUTION TOPICAL at 11:20

## 2019-01-01 RX ADMIN — Medication 1000 MILLIGRAM(S): at 19:52

## 2019-01-01 RX ADMIN — Medication 1300 MILLIGRAM(S): at 07:01

## 2019-01-01 RX ADMIN — METHADONE HYDROCHLORIDE 5 MILLIGRAM(S): 40 TABLET ORAL at 06:36

## 2019-01-01 RX ADMIN — Medication 3 MILLILITER(S): at 11:30

## 2019-01-01 RX ADMIN — Medication 4: at 00:25

## 2019-01-01 RX ADMIN — Medication 8.97 MICROGRAM(S)/KG/MIN: at 03:50

## 2019-01-01 RX ADMIN — Medication 650 MILLIGRAM(S): at 21:53

## 2019-01-01 RX ADMIN — HEPARIN SODIUM 7500 UNIT(S): 5000 INJECTION INTRAVENOUS; SUBCUTANEOUS at 21:15

## 2019-01-01 RX ADMIN — Medication 650 MILLIGRAM(S): at 14:23

## 2019-01-01 RX ADMIN — Medication 1300 MILLIGRAM(S): at 15:33

## 2019-01-01 RX ADMIN — Medication 1300 MILLIGRAM(S): at 00:22

## 2019-01-01 RX ADMIN — Medication 3 MILLILITER(S): at 12:11

## 2019-01-01 RX ADMIN — HEPARIN SODIUM 7500 UNIT(S): 5000 INJECTION INTRAVENOUS; SUBCUTANEOUS at 05:27

## 2019-01-01 RX ADMIN — MEROPENEM 100 MILLIGRAM(S): 1 INJECTION INTRAVENOUS at 17:52

## 2019-01-01 RX ADMIN — SODIUM CHLORIDE 80 MILLILITER(S): 9 INJECTION, SOLUTION INTRAVENOUS at 16:17

## 2019-01-01 RX ADMIN — Medication 250 MILLIGRAM(S): at 19:14

## 2019-01-01 RX ADMIN — Medication 3 MILLILITER(S): at 06:28

## 2019-01-01 RX ADMIN — QUETIAPINE FUMARATE 50 MILLIGRAM(S): 200 TABLET, FILM COATED ORAL at 18:00

## 2019-01-01 RX ADMIN — Medication 975 MILLIGRAM(S): at 21:16

## 2019-01-01 RX ADMIN — Medication 2: at 17:33

## 2019-01-01 RX ADMIN — Medication 50 MILLILITER(S): at 21:28

## 2019-01-01 RX ADMIN — HEPARIN SODIUM 7500 UNIT(S): 5000 INJECTION INTRAVENOUS; SUBCUTANEOUS at 13:37

## 2019-01-01 RX ADMIN — QUETIAPINE FUMARATE 25 MILLIGRAM(S): 200 TABLET, FILM COATED ORAL at 05:44

## 2019-01-01 RX ADMIN — Medication 3 MILLILITER(S): at 22:05

## 2019-01-01 RX ADMIN — Medication 3 MILLILITER(S): at 17:08

## 2019-01-01 RX ADMIN — HEPARIN SODIUM 7500 UNIT(S): 5000 INJECTION INTRAVENOUS; SUBCUTANEOUS at 14:51

## 2019-01-01 RX ADMIN — CHLORHEXIDINE GLUCONATE 1 APPLICATION(S): 213 SOLUTION TOPICAL at 07:30

## 2019-06-13 NOTE — PATIENT PROFILE ADULT - NSTOBACCOWITHDRW_GEN_A_CORE_SD
MINESH pt confused. Not cooperating when asked question irritability/MINESH pt confused. Not cooperating when asked question

## 2019-06-13 NOTE — ED PROVIDER NOTE - CARE PLAN
Principal Discharge DX:	Acute congestive heart failure, unspecified heart failure type Principal Discharge DX:	Uremia

## 2019-06-13 NOTE — CONSULT NOTE ADULT - SUBJECTIVE AND OBJECTIVE BOX
HPI:  68 y/o M PMH HTN, smoker, chronic back pain, s/p radical cystectomy w/ one remaining kidney, colostomy, p/w SOB x 3 days. Pt denies fever, cough, CP. Pt in pain in R back. No belly pain. Could not provide hx of when had procedures or smoking hx.    In the ED, pt afebrile, HR 72, satting 100% on 3L NC, RR 22-->18, BP 90/60-->120s. Labs significant for WBC 30, K 5.7, bicarb 11, AG 20, VBG pH 7.12-->7.08, CO2 40, lactate 2.6, BUN ~70, Cr ~4, unknown baseline. Carboxyhemoglobin slightly elevated at 2.6. CXR clear. CT chest abd concerning for metastatic lung cancer, and possible cholecystitis and nephritis w/ hydro. S/p vanc, cefepime, ketamine, bicarb drip 150/hr, insulin/ d50, LR 1 L bolus. Transferred to [facility] MICU for emergent HD and further care. On arrival, renal and urology consulted. Pt putting out urine into nephrostomy tube and abg improved so no urgent HD, but renal will monitor and start HD if needed. Pt started on vanc/ zosyn for pyelonephritis and broad coverage while r/o pna and cholecystitis, or infected surgical wound. (2019 15:29)    : above noted. Patient poor historian. Called to evaluate abnormal CT results. Patient with h/o atrophic R kidney. Doesnt recall when he had cystectomy, ileal conduit. Patient with evidence met lung Ca. Noted to have elevated Cr of 4. Unknown baseline Cr.   Lactate 2.6. WBC elevated. Admitted with severe acidemia and sepsis.       PAST MEDICAL & SURGICAL HISTORY:  Chronic back pain  HTN (hypertension)  S/P colostomy  H/O total cystectomy      MEDICATIONS  (STANDING):  albumin human 25% IVPB 50 milliLiter(s) IV Intermittent once  chlorhexidine 4% Liquid 1 Application(s) Topical <User Schedule>  dextrose 5%. 1000 milliLiter(s) (50 mL/Hr) IV Continuous <Continuous>  dextrose 50% Injectable 12.5 Gram(s) IV Push once  dextrose 50% Injectable 25 Gram(s) IV Push once  dextrose 50% Injectable 25 Gram(s) IV Push once  heparin  Injectable 7500 Unit(s) SubCutaneous every 8 hours  insulin lispro (HumaLOG) corrective regimen sliding scale   SubCutaneous Before meals and at bedtime  lactated ringers. 1000 milliLiter(s) (100 mL/Hr) IV Continuous <Continuous>  methadone    Tablet 5 milliGRAM(s) Oral daily  norepinephrine Infusion 0.05 MICROgram(s)/kG/Min (8.972 mL/Hr) IV Continuous <Continuous>  piperacillin/tazobactam IVPB. 2.25 Gram(s) IV Intermittent every 8 hours    MEDICATIONS  (PRN):  ALBUTerol/ipratropium for Nebulization 3 milliLiter(s) Nebulizer every 4 hours PRN Shortness of Breath and/or Wheezing  dextrose 40% Gel 15 Gram(s) Oral once PRN Blood Glucose LESS THAN 70 milliGRAM(s)/deciliter  glucagon  Injectable 1 milliGRAM(s) IntraMuscular once PRN Glucose LESS THAN 70 milligrams/deciliter  HYDROmorphone  Injectable 0.5 milliGRAM(s) IV Push every 4 hours PRN Severe Pain (7 - 10)      Allergies    No Known Allergies    Intolerances        SOCIAL HISTORY:    FAMILY HISTORY:      Vital Signs Last 24 Hrs  T(C): 36.9 (2019 21:58), Max: 37.1 (2019 09:41)  T(F): 98.5 (2019 21:58), Max: 98.7 (2019 09:41)  HR: 68 (2019 23:40) (46 - 95)  BP: 94/42 (2019 23:40) (72/43 - 128/70)  BP(mean): 61 (2019 23:40) (52 - 100)  RR: 18 (2019 23:40) (15 - 38)  SpO2: 99% (2019 23:40) (93% - 100%)    On PE:  General: awake , responsive  Abdomen: obese, +midline scar with eschar noted, ileal conduit intact, urine cloudy, +parastomal hernia    : nml male    EXT: venous stasis dx L >RLE    LABS:                        10.9   23.18 )-----------( 347      ( 2019 18:00 )             33.5     06-13    136  |  102  |  60<H>  ----------------------------<  163<H>  4.4   |  20<L>  |  3.75<H>    Ca    7.4<L>      2019 20:33  Mg     2.0         TPro  6.5  /  Alb  2.5<L>  /  TBili  0.3  /  DBili  x   /  AST  9<L>  /  ALT  7<L>  /  AlkPhos  91      PT/INR - ( 2019 18:00 )   PT: 16.8 sec;   INR: 1.47          PTT - ( 2019 18:00 )  PTT:29.1 sec  Urinalysis Basic - ( 2019 16:17 )    Color: Yellow / Appearance: Clear / S.020 / pH: x  Gluc: x / Ketone: NEGATIVE  / Bili: NEGATIVE / Urobili: 0.2 E.U./dL   Blood: x / Protein: >=300 mg/dL / Nitrite: NEGATIVE   Leuk Esterase: Moderate / RBC: > 10 /HPF / WBC Many /HPF   Sq Epi: x / Non Sq Epi: 0-5 /HPF / Bacteria: Present /HPF        RADIOLOGY & ADDITIONAL STUDIES:< from: CT Abdomen and Pelvis No Cont (19 @ 12:46) >  IMPRESSION: Limited study without contrast.    7 cm masslike consolidation right lower lobe suspicious for neoplasm.   Superimposed pneumonia cannot exclude.    Mediastinal, right hilar and abdominal lymphadenopathy.    Hypodense hepatic lesions, suspicious for metastasis.    Right adrenal mass and left adrenal nodules, likely metastatic in nature.    Status post radical cystectomy and ileal conduit. Right renal atrophy   with moderate hydronephrosis and perinephric stranding. Superimposed   urinary tract infection is not excluded.    Parastomal nonobstructive herniation of ileocecal bowel in the right   lower quadrant.    Distended gallbladder with layering gallstones, favor gallbladder   hydrops. Consider radionuclide biliary scan.    Mild aneurysmal dilatation of the aortic root and ascending aorta.      < end of copied text >

## 2019-06-13 NOTE — ED ADULT NURSE NOTE - OBJECTIVE STATEMENT
as per partner he has had worsening SOB, also reports PMH of hypotension last week at neurologist office

## 2019-06-13 NOTE — ED PROVIDER NOTE - CLINICAL SUMMARY MEDICAL DECISION MAKING FREE TEXT BOX
Patient is ill appearing but stable, speaking, AAO x 3, and feels better after pain control with ketamine in the ED. Breathing better. However, given patient's single kidney status with HUGO of Cr of 4.5, inability to perform CT with IV contrast given radiology's concern for PAMELA, WBC of 30 and BNP of 5000 wit ectopic P waves in the inferior leads, elevated K of 5.7, and the possible need for emergent dialysis, patient should be transferred to [facility] for critical care involving multiple disciplines including cardiology, nephrology, and intensivist. Patient is ill appearing but stable, speaking, AAO x 3, and feels better after pain control with ketamine in the ED. Breathing better. However, given patient's single kidney status with HUGO of Cr of 4.5, inability to perform CT with IV contrast given radiology's concern for PAMELA, WBC of 30 and BNP of 5000 wit ectopic P waves in the inferior leads, elevated K of 5.7, and the possible need for emergent dialysis, patient should be transferred to [facility] for critical care involving multiple disciplines including cardiology, nephrology, and intensivist.    Accepted by Dr. Wilkerson for transfer to MICU level care at [facility]

## 2019-06-13 NOTE — ED PROVIDER NOTE - OBJECTIVE STATEMENT
68 y/o M with PMHx of HTN and chronic back pain presents to ED with sudden onset of SOB x3-4 days. Pt denies any fever, coughs, or CP. Pt states to having back pains on exam. He denies any other medical complaints.

## 2019-06-13 NOTE — ED PROVIDER NOTE - CRITICAL CARE PROVIDED
interpretation of diagnostic studies/consultation with other physicians/telephone consultation with the patient's family/direct patient care (not related to procedure)/documentation/additional history taking/conducted a detailed discussion of DNR status/consult w/ pt's family directly relating to pts condition

## 2019-06-13 NOTE — PATIENT PROFILE ADULT - NSTOBACCOQUITATTEMPT_GEN_A_CORE_SD
MINESH pt confused. Not cooperating when asked question nicotine replacement therapy/MINESH pt confused. Not cooperating when asked question

## 2019-06-13 NOTE — CONSULT NOTE ADULT - ASSESSMENT
68 yo male with h/o ileal conduit, cystectomy; sepsis, acidemia, parastomal hernia, atrophic R kidney with mod hydro

## 2019-06-13 NOTE — ED PROVIDER NOTE - PROGRESS NOTE DETAILS
Back pain improved, patient no longer in acute distress, reports relief of symptoms, no longer tachypneic. Speaking full sentences and able to make needs known.    Charo attempted x 3 by multiple staff in ED with no success. Likely obstruction. Patient reassessed. Reports feeling much better, saying "I'm still alive! Is it possible to get something to eat? I'm feeling good but hungry now." Appears less toxic.

## 2019-06-13 NOTE — PATIENT PROFILE ADULT - NSALCOHOLWITHDRAWAL_GEN_A_NUR
MINESH pt confused. Not cooperating when asked question MINESH pt confused. Not cooperating when asked question/none

## 2019-06-13 NOTE — H&P ADULT - HISTORY OF PRESENT ILLNESS
70 y/o M PMH HTN, chronic back pain, s/p radical cystectomy p/w SOB x 3 days. Pt denies fever, cough, CP.    In the ED, pt afebrile, HR 72, satting 100% on RA, RR 22, BP 90/60. Improved to RR 18 and sBP 110s. Labs significant for WBC 30, k 5.7, BICARB 11, AG 20, VBG pH 7.12-->7.08, CO2 40, lactate 2.6, BUN ~70, Cr ~4, unknown baseline. Carboxyhemoglobin slightly elevated at 2.6. CXR clear. CT chest abd concerning for metastatic lung cancer, and possible cholecystitis and nephritis. S/p vanc, cefepime, ketamine, bicarb drip 150/hr, insulin/ d50, LR 1 L bolus. Transferred to [facility] MICU for emergent HD. 70 y/o M PMH HTN, chronic back pain, s/p radical cystectomy w/ one remaining kidney, colostomy, p/w SOB x 3 days. Pt denies fever, cough, CP.    In the ED, pt afebrile, HR 72, satting 100% on 3L NC, RR 22-->18, BP 90/60-->120s. Labs significant for WBC 30, K 5.7, bicarb 11, AG 20, VBG pH 7.12-->7.08, CO2 40, lactate 2.6, BUN ~70, Cr ~4, unknown baseline. Carboxyhemoglobin slightly elevated at 2.6. CXR clear. CT chest abd concerning for metastatic lung cancer, and possible cholecystitis and nephritis w/ hydro. S/p vanc, cefepime, ketamine, bicarb drip 150/hr, insulin/ d50, LR 1 L bolus. Transferred to [facility] MICU for emergent HD and further care. 70 y/o M PMH HTN, smoker, chronic back pain, s/p radical cystectomy w/ one remaining kidney, colostomy, p/w SOB x 3 days. Pt denies fever, cough, CP. Pt in pain in R back. No belly pain. Could not provide hx of when had procedures or smoking hx.    In the ED, pt afebrile, HR 72, satting 100% on 3L NC, RR 22-->18, BP 90/60-->120s. Labs significant for WBC 30, K 5.7, bicarb 11, AG 20, VBG pH 7.12-->7.08, CO2 40, lactate 2.6, BUN ~70, Cr ~4, unknown baseline. Carboxyhemoglobin slightly elevated at 2.6. CXR clear. CT chest abd concerning for metastatic lung cancer, and possible cholecystitis and nephritis w/ hydro. S/p vanc, cefepime, ketamine, bicarb drip 150/hr, insulin/ d50, LR 1 L bolus. Transferred to [facility] MICU for emergent HD and further care. On arrival, renal and urology consulted. Pt putting out urine into nephrostomy tube and abg improved so no urgent HD, but renal will monitor and start HD if needed. Pt started on vanc/ zosyn for pyelonephritis and broad coverage while r/o pna and cholecystitis, or infected surgical wound. 70 y/o M PMH HTN, smoker, chronic back pain, s/p radical cystectomy w/ one remaining kidney w/ nephrostomy /w SOB x 3 days. Pt denies fever, cough, CP. Pt in pain in R back. No belly pain. Could not provide hx of when had procedures or smoking hx.    In the ED, pt afebrile, HR 72, satting 100% on 3L NC, RR 22-->18, BP 90/60-->120s. Labs significant for WBC 30, K 5.7, bicarb 11, AG 20, VBG pH 7.12-->7.08, CO2 40, lactate 2.6, BUN ~70, Cr ~4, unknown baseline. Carboxyhemoglobin slightly elevated at 2.6. CXR clear. CT chest abd concerning for metastatic lung cancer, and possible cholecystitis and nephritis w/ hydro. S/p vanc, cefepime, ketamine, bicarb drip 150/hr, insulin/ d50, LR 1 L bolus. Transferred to [facility] MICU for emergent HD and further care. On arrival, renal and urology consulted. Pt putting out urine into nephrostomy tube and abg improved so no urgent HD, but renal will monitor and start HD if needed. Pt started on vanc/ zosyn for pyelonephritis and broad coverage while r/o pna and cholecystitis, or infected surgical wound.

## 2019-06-13 NOTE — H&P ADULT - NSHPLABSRESULTS_GEN_ALL_CORE
.  LABS:                         12.7   32.0  )-----------( 401      ( 13 Jun 2019 13:05 )             39.5     06-13    132  |  100  |  74<H>  ----------------------------<  249<H>  4.8   |  12<L>  |  4.72<H>    Ca    8.7      13 Jun 2019 13:05  Mg     2.0     06-13    TPro  7.8  /  Alb  2.4<L>  /  TBili  0.4  /  DBili  x   /  AST  14<L>  /  ALT  12  /  AlkPhos  113  06-13    PTT - ( 13 Jun 2019 10:13 )  PTT:30.0 sec    CARDIAC MARKERS ( 13 Jun 2019 13:05 )  0.028 ng/mL / x     / 34 U/L / x     / x      CARDIAC MARKERS ( 13 Jun 2019 10:13 )  0.017 ng/mL / x     / 44 U/L / x     / 0.8 ng/mL      Serum Pro-Brain Natriuretic Peptide: 4348 pg/mL (06-13 @ 10:13)    Lactate, Blood: 2.6 mmoL/L (06-13 @ 13:05)      RADIOLOGY, EKG & ADDITIONAL TESTS: Reviewed. .  LABS:                         10.9   23.18 )-----------( 347      ( 2019 18:00 )             33.5         134<L>  |  99  |  62<H>  ----------------------------<  185<H>  4.5   |  17<L>  |  3.88<H>    Ca    7.7<L>      2019 18:00  Mg     2.0         TPro  6.5  /  Alb  2.5<L>  /  TBili  0.3  /  DBili  x   /  AST  9<L>  /  ALT  7<L>  /  AlkPhos  91      PT/INR - ( 2019 18:00 )   PT: 16.8 sec;   INR: 1.47          PTT - ( 2019 18:00 )  PTT:29.1 sec  Urinalysis Basic - ( 2019 16:17 )    Color: Yellow / Appearance: Clear / S.020 / pH: x  Gluc: x / Ketone: NEGATIVE  / Bili: NEGATIVE / Urobili: 0.2 E.U./dL   Blood: x / Protein: >=300 mg/dL / Nitrite: NEGATIVE   Leuk Esterase: Moderate / RBC: > 10 /HPF / WBC Many /HPF   Sq Epi: x / Non Sq Epi: 0-5 /HPF / Bacteria: Present /HPF      CARDIAC MARKERS ( 2019 13:05 )  0.028 ng/mL / x     / 34 U/L / x     / x      CARDIAC MARKERS ( 2019 10:13 )  0.017 ng/mL / x     / 44 U/L / x     / 0.8 ng/mL      Serum Pro-Brain Natriuretic Peptide: 4348 pg/mL ( @ 10:13)    Lactate, Blood: 2.8 mmoL/L ( @ 18:00)  Lactate, Blood: 2.6 mmoL/L ( @ 13:05)      RADIOLOGY, EKG & ADDITIONAL TESTS: Reviewed.

## 2019-06-13 NOTE — CONSULT NOTE ADULT - SUBJECTIVE AND OBJECTIVE BOX
Renal consult  patient is a poor historian, therefore detailed information could not be obtained.  patient is a 69 y o male with pmh of radical cystectomy wwho was transferred from  for further medical care.   Upon presentation, patient was found to have bun/cr 73/4.7, K @ 5.7 and bicarb @ 11.  Nephrology consult is placed in regards to abnormal kidney function along with acidosis.   On VBG, pt had a ph @ 7.12.   vitals on arrival: BP @ 90/60, HR @ 72, RR 22 and osat 100 % RA.  Patient unable to provide information about baseline kidney function.   UA--> protein and blood  HE denies recent use of NSAids.   Unclear if patient had received any Contrast recently within the last 48   CT scan--> 7 cm masslike consolidation right lower lobe suspicious for neoplasm.  Superimposed pneumonia cannot exclude. Mediastinal, right hilar and abdominal lymphadenopathy.Hypodense hepatic lesions, suspicious for metastasis. Right adrenal mass and left adrenal nodules, likely metastatic in nature. Status post radical cystectomy and ileal conduit. Right renal atrophy with moderate hydronephrosis and perinephric stranding. Left kidney is of normal size and without hydronephrosis.  Patient had received Lactate ringers, 2  amp of sodium Bicarbonate, and 2 L of NS - bolus.  For hyperkalemia, pt had received insulin and glucose, bicarbonate and duoneb  repeat labs value--> bUN/cr @ 62/3.88 and ABG--> 7.37/34/89.      PAST MEDICAL & SURGICAL HISTORY:  Chronic back pain  HTN (hypertension)  H/O total cystectomy      Allergies  No Known Allergies  Intolerances        FAMILY HISTORY: NA      SOCIAL HISTORY: unknown     MEDICATIONS  (STANDING):  HYDROmorphone  Injectable 0.5 milliGRAM(s) IV Push once  sodium bicarbonate  Infusion 0.225 mEq/kG/Hr (150 mL/Hr) IV Continuous <Continuous>  sodium bicarbonate  Injectable 100 milliEquivalent(s) IV Push once    MEDICATIONS  (PRN):      REVIEW OF SYSTEMS:    CONSTITUTIONAL: No fever or chills, No fatigue or tiredness.  EYES: No blurred or double vision.  ENT: No recent URI or sore throat  RESPIRATORY: No shortness of breath, cough, hemoptysis  CARDIOVASCULAR: No Chest pain or shortness of breath  GASTROINTESTINAL: NO abdominal or flank pain, No nausea or vomiting, No diarrhea  GENITOURINARY: No dysuria or urinary burning, No difficulty passing urine, No hematuria  NEUROLOGICAL: No headaches or blurred vision  SKIN: No skin rashes   MUSCULOSKELETAL:  legs, back pain         PHYSICAL EXAM:  GENERAL: moaning, unkempt  HEAD:  Atraumatic, Normocephalic,   EYES: Bilateral conjunctiva and sclera normal,  Oral cavity: dry mucous membranes, complete edentation   NECK: Neck supple, No JVD  CHEST/LUNG: Clear to auscultation bilaterally; No wheeze, no rales, no crepitations  HEART: Regular rate and rhythm. KJ II/VI at LPSB, No gallop, no rub   ABDOMEN: obese, + urostomy bag located on the R , + surgical scar on the LLQ, non healing mid abdominal scar   EXTREMITIES: No clubbing, cyanosis, or edema, no calf tenderness  Neurology: AAOx3, no focal neurological deficit        CAPILLARY BLOOD GLUCOSE      POCT Blood Glucose.: 187 mg/dL (2019 17:19)      I&O's Summary        LABS:                                                   19 @ 18:00    134<L>  |  99  |  62<H>  ----------------------------<  185<H>  4.5   |  17<L>  |  3.88<H>                                                 19 @ 13:05    132  |  100  |  74<H>  ----------------------------<  249<H>  4.8   |  12<L>  |  4.72<H>                                                 19 @ 10:13    132  |  101  |  73<H>  ----------------------------<  125<H>  5.7<H>   |  11<L>  |  4.47<H>    Ca    7.7<L>      2019 18:00  Ca    8.7      2019 13:05  Ca    9.1      2019 10:13  Mg     2.0         TPro  6.5  /  Alb  2.5<L>  /  TBili  0.3  /  DBili  x   /  AST  9<L>  /  ALT  7<L>  /  AlkPhos  91  06-13  TPro  7.8  /  Alb  2.4<L>  /  TBili  0.4  /  DBili  x   /  AST  14<L>  /  ALT  12  /  AlkPhos  113  -13  TPro  8.1  /  Alb  2.5<L>  /  TBili  0.3  /  DBili  x   /  AST  16  /  ALT  9<L>  /  AlkPhos  115  06-13                          10.9   23.18 )-----------( 347      ( 2019 18:00 )             33.5     CBC Full  -  ( 2019 18:00 )  WBC Count : 23.18 K/uL  Hemoglobin : 10.9 g/dL  Hematocrit : 33.5 %  Platelet Count - Automated : 347 K/uL  Mean Cell Volume : 81.5 fl      CBC Full  -  ( 2019 13:05 )  WBC Count : 32.0 K/uL  Hemoglobin : 12.7 g/dL  Hematocrit : 39.5 %  Platelet Count - Automated : 401 K/uL  Mean Cell Volume : 81.8 fL      CBC Full  -  ( 2019 11:20 )  WBC Count : 30.3 K/uL  Hemoglobin : 13.3 g/dL  Hematocrit : 41.0 %  Platelet Count - Automated : 420 K/uL  Mean Cell Volume : 81.7 fL      CBC Full  -  ( 2019 10:13 )  WBC Count : 30.8 K/uL  Hemoglobin : 13.3 g/dL  Hematocrit : 40.8 %  Platelet Count - Automated : 433 K/uL  Mean Cell Volume : 81.3 fL        PT/INR - ( 2019 18:00 )   PT: 16.8 sec;   INR: 1.47          PTT - ( 2019 18:00 )  PTT:29.1 sec  CARDIAC MARKERS ( 2019 13:05 )  0.028 ng/mL / x     / 34 U/L / x     / x      CARDIAC MARKERS ( 2019 10:13 )  0.017 ng/mL / x     / 44 U/L / x     / 0.8 ng/mL      Urinalysis Basic - ( 2019 16:17 )    Color: Yellow / Appearance: Clear / S.020 / pH: x  Gluc: x / Ketone: NEGATIVE  / Bili: NEGATIVE / Urobili: 0.2 E.U./dL   Blood: x / Protein: >=300 mg/dL / Nitrite: NEGATIVE   Leuk Esterase: Moderate / RBC: > 10 /HPF / WBC Many /HPF   Sq Epi: x / Non Sq Epi: 0-5 /HPF / Bacteria: Present /HPF        RADIOLOGY & ADDITIONAL TESTS:    EKG/Telemetry: Reviewed

## 2019-06-13 NOTE — H&P ADULT - NSHPPHYSICALEXAM_GEN_ALL_CORE
.  VITAL SIGNS:  T(C): 36.9 (06-13-19 @ 13:34), Max: 37.1 (06-13-19 @ 09:41)  T(F): 98.4 (06-13-19 @ 13:34), Max: 98.7 (06-13-19 @ 09:41)  HR: 85 (06-13-19 @ 13:34) (71 - 91)  BP: 107/64 (06-13-19 @ 13:00) (90/60 - 120/57)  BP(mean): --  RR: 18 (06-13-19 @ 13:34) (18 - 22)  SpO2: 98% (06-13-19 @ 13:34) (96% - 100%)  Wt(kg): --    PHYSICAL EXAM:    Constitutional: WDWN resting comfortably in bed; NAD  Head: NC/AT  Eyes: PERRL, EOMI, clear conjunctiva  ENT: no nasal discharge; uvula midline, no oropharyngeal erythema or exudates; MMM  Neck: supple; no JVD or thyromegaly  Respiratory: CTA B/L; no W/R/R, no retractions  Cardiac: +S1/S2; RRR; no M/R/G;  Gastrointestinal: soft, NT/ND; no rebound or guarding; +BSx4  Extremities: WWP, no clubbing or cyanosis; no peripheral edema  Musculoskeletal: NROM x4; no joint swelling, tenderness or erythema  Vascular: 2+ radial, femoral, DP/PT pulses B/L  Dermatologic: skin warm, dry and intact; no rashes, wounds, or scars  Neurologic: AAOx3; CNII-XII grossly intact; no focal deficits  Psychiatric: affect and characteristics of appearance, verbalizations, behaviors are appropriate ICU Vital Signs Last 24 Hrs  T(C): 36.7 (13 Jun 2019 17:30), Max: 37.1 (13 Jun 2019 09:41)  T(F): 98 (13 Jun 2019 17:30), Max: 98.7 (13 Jun 2019 09:41)  HR: 72 (13 Jun 2019 19:00) (71 - 95)  BP: 101/57 (13 Jun 2019 19:00) (90/60 - 120/57)  BP(mean): 77 (13 Jun 2019 19:00) (73 - 81)  ABP: --  ABP(mean): --  RR: 22 (13 Jun 2019 19:00) (18 - 24)  SpO2: 100% (13 Jun 2019 19:00) (96% - 100%)    Constitutional: overweight male uncomfortable and groaning in pain intermittently  Head: NC/AT  Eyes: PERRL, EOMI, clear conjunctiva  ENT: no nasal discharge; uvula midline, no oropharyngeal erythema or exudates; dry mucous membranes  Neck: supple; no JVD or thyromegaly  Respiratory: diffuse rhonchi, no increased WOB, on NC  Cardiac: +S1/S2; RRR; no M/R/G;   Gastrointestinal: distended abd with hard bulges, mildly TTP w/o rebound or guarding, colostomy bag in place, dark red/brown large scab over central abdomen   Extremities:  scaling grey skin in LEs, no edema  Musculoskeletal: NROM x4; no joint swelling, tenderness or erythema  : no CVA tenderness on L, +CVA tenderness on R, nephrostomy draining urine from r kidney  Neurologic: awake and alert and following commands, poor historian ICU Vital Signs Last 24 Hrs  T(C): 36.7 (13 Jun 2019 17:30), Max: 37.1 (13 Jun 2019 09:41)  T(F): 98 (13 Jun 2019 17:30), Max: 98.7 (13 Jun 2019 09:41)  HR: 72 (13 Jun 2019 19:00) (71 - 95)  BP: 101/57 (13 Jun 2019 19:00) (90/60 - 120/57)  BP(mean): 77 (13 Jun 2019 19:00) (73 - 81)  ABP: --  ABP(mean): --  RR: 22 (13 Jun 2019 19:00) (18 - 24)  SpO2: 100% (13 Jun 2019 19:00) (96% - 100%)    Constitutional: overweight male uncomfortable and groaning in pain intermittently  Head: NC/AT  Eyes: PERRL, EOMI, clear conjunctiva  ENT: no nasal discharge; uvula midline, no oropharyngeal erythema or exudates; dry mucous membranes  Neck: supple; no JVD or thyromegaly  Respiratory: diffuse rhonchi, no increased WOB, on NC  Cardiac: +S1/S2; RRR; no M/R/G;   Gastrointestinal: distended abd with hard bulges, mildly TTP w/o rebound or guarding, nephrostomy bag in place in abdomen, dark red/brown large scab/ wound over central abdomen   Extremities:  scaling grey skin in LEs, no edema  Musculoskeletal: NROM x4; no joint swelling, tenderness or erythema  : no CVA tenderness on L, +CVA tenderness on R, nephrostomy draining urine from center of belly  Neurologic: awake and alert and following commands, poor historian

## 2019-06-13 NOTE — CONSULT NOTE ADULT - ATTENDING COMMENTS
severe sepsis and acidosis improving w bicarb drip and bicarb pushes, hemodynamically stable wit 350cc purulent UO - needs stat  consultation, open wound on abdomen w necrotic tissue. Aggressively hydrate with NS (hyperkalemia precluding LR use) and strict UO monitoring. Please notify nephrology asap if UO not >30cc/hr. For now no urgent req. for RRT

## 2019-06-13 NOTE — PATIENT PROFILE ADULT - FALLEN IN THE PAST
yes/"I believe so." He is unable to tell me about any hx. recent or distant. the answer to most questions is, "I don't know."

## 2019-06-13 NOTE — CONSULT NOTE ADULT - PROBLEM SELECTOR RECOMMENDATION 9
-ICU care  -IV antibx  -f/u cultures  -resuscitate, hydrate  -wet to dry dressing abdominal wall eschar BID  -if not improving may need gallium scan  -will follow

## 2019-06-13 NOTE — H&P ADULT - ASSESSMENT
70 y/o M PMH HTN, chronic back pain, s/p radical cystectomy p/w SOB, found to have severe acidemia, admitted to MICU for emergent HD and further workup.    Neurology:   -s/p ketamine 50mg IVP for anxiety in the setting of SOB    CARDIAC  #mild aortic aneurysm  dilation of the aortic root and ascending aorta on ct, no known hx  -control sBP <150   -consider echo for AR    PULMONARY  #suspected lung cancer w/ mets  CT chest/ abd w/o contrast due to cr 4.5 showing 7cm RLL mass concerning for neoplasm w/ suspected hepatic and adrenal mets and abd LAD on ct  -outpt f.u w/ pulm/ onc for pet vs biopsy    #r/o PNA  CT chest cannot r/o PNA, cxr shows no obvious infiltrate but pt SOB w/ tacypnea and WBC 30, no hypoxia, no fevers, s/p vanc/ cefepime in ED  -sputum cx  -c/w vanc/ cefepime for now to cover broadly as unclear source of infection, see below under ID    #r/o CO poisoning  pt SOB, w/ tachypnea, no hypoxia on pulse ox but inaccurate in CO poisoning, carboxyhemoglobin mildly elevated at 2.6 but not very high  -supplemental O2  -recheck carboxyhemoglobin to ensure stable    RENAL  #uremia  bun ~70, unclear baseline  -emergent HD as above  -monitor bmp    #HUGO vs CKD  Cr 4.5, unknown baseline  -obtain collateral on cr baseline  -emergent HD  -renally dose medications    Metabolic  #AG metabolic acidosis w/ relative resp acidosis (no compensation)  bicarb 11, AG 20, likely 2/2 lactate 2.6 vs azotemia, pH vbg 7.12-->7.08, co2 40 so no compensatory resp alkalosis (no expected hyperventilation), hypoventilation likely 2/2 drugs/ meds vs uremia vs tiring out from increased WOB from prior tachypnea, s/p bicarb gtt and 1 L LR  -c/w bicarb drip  -avoid NS if giving fluids to prevent further worsening acidimia  -repeat lactate q4, trend to <2  -renal consult for emergent HD  -monitor ABG  -utox for causes of hypoventilation other than uremia  -serum osm for osmolar gap calc    ID  #sirs, r/o sepsis  wbc 30, w/ tachypnea to 22 meets 2/4 sirs crtiria, unclear source, afebrile, lactate 2.6. cxr clear, ct chest cannot r/o consolidation in RLL under mass. ct scan showing perinephritic stranding and mod hydronephrosis concerning for UTI/ nephritis also shows distended gallbladder w/ stones concerning for cholecystitis, lipase wnl  -UA --> if positive urine cx  -monitor wbc  -f/u bcx  -RUQ US to r/o cholecystitis    ENDOCRINE  #elevated glucose  glucose 249  -ISS  -hba1c    #hyperkalemia  k 5.7--> now resolved 4.5 s/p insulin/d50 and bicarb drip  -monitor bmp    OTHER  F: PO hydration, no NS can give d5 or lr if need ivf  E: replete PRN but cautious in HUGO vs CKD requiring HD  N: renal/ dash diet  DVT ppx: HSQ 5000 q8  other ppx: none required  FULL CODE  dispo: MICU  Ambulate as tolerated 68 y/o M PMH HTN, chronic back pain, s/p radical cystectomy p/w SOB, found to have severe acidemia, admitted to MICU for emergent HD and further workup.    Neurology:   -no active issues    CARDIAC  #mild aortic aneurysm  dilation of the aortic root and ascending aorta on ct, no known hx  -control sBP <150   -consider echo for AR    PULMONARY  #suspected lung cancer w/ mets  CT chest/ abd w/o contrast due to cr 4.5 showing 7cm RLL mass concerning for neoplasm w/ suspected hepatic and adrenal mets and abd LAD on ct  -outpt f.u w/ pulm/ onc for pet vs biopsy    #r/o PNA  CT chest cannot r/o PNA, cxr shows no obvious infiltrate but pt SOB w/ tacypnea and WBC 30, no hypoxia, no fevers, s/p vanc/ cefepime in ED  -sputum cx  -c/w vanc/ cefepime for now to cover broadly as unclear source of infection, see below under ID    #r/o CO poisoning  pt SOB, w/ tachypnea, no hypoxia on pulse ox but inaccurate in CO poisoning, carboxyhemoglobin mildly elevated at 2.6 but not very high  -supplemental O2  -recheck carboxyhemoglobin to ensure stable    RENAL  #uremia  bun ~70, unclear baseline  -emergent HD as above  -monitor bmp    #HUGO vs CKD  Cr 4.5, unknown baseline  -obtain collateral on cr baseline  -emergent HD  -renally dose medications    Metabolic  #AG metabolic acidosis w/ relative resp acidosis (no compensation)  bicarb 11, AG 20, likely 2/2 lactate 2.6 vs azotemia, pH vbg 7.12-->7.08, co2 40 so no compensatory resp alkalosis (no expected hyperventilation), hypoventilation likely 2/2 drugs/ meds vs uremia vs tiring out from increased WOB from prior tachypnea, s/p bicarb gtt and 1 L LR  -c/w bicarb drip  -avoid NS if giving fluids to prevent further worsening acidimia  -repeat lactate q4, trend to <2  -renal consult for emergent HD  -monitor ABG  -utox for causes of hypoventilation other than uremia  -serum osm for osmolar gap calc    ID  #sirs, r/o sepsis  wbc 30, w/ tachypnea to 22 meets 2/4 sirs crtiria, unclear source, afebrile, lactate 2.6. cxr clear, ct chest cannot r/o consolidation in RLL under mass. ct scan showing perinephritic stranding and mod hydronephrosis concerning for UTI/ nephritis also shows distended gallbladder w/ stones concerning for cholecystitis, lipase wnl  -UA --> if positive urine cx  -monitor wbc  -f/u bcx  -RUQ US to r/o cholecystitis    ENDOCRINE  #elevated glucose  glucose 249  -ISS  -hba1c    #hyperkalemia  k 5.7--> now resolved 4.5 s/p insulin/d50 and bicarb drip  -monitor bmp    MSK  #chronic back pain  -s/p ketamine 50mg IVP for back pain    OTHER  F: PO hydration, no NS can give d5 or lr if need ivf  E: replete PRN but cautious in HUGO vs CKD requiring HD  N: renal/ dash diet  DVT ppx: HSQ 5000 q8  other ppx: none required  FULL CODE  dispo: MICU  Ambulate as tolerated 68 y/o M PMH HTN, chronic back pain, s/p radical cystectomy p/w SOB, found to have severe acidemia, admitted to MICU for emergent HD and further workup.    Neurology:   #methadone use  utox  positive for methadone only  -hold methadone in setting of sepsis  -collateral for dosing    CARDIAC  #mild aortic aneurysm  dilation of the aortic root and ascending aorta on ct, no known hx  -control sBP <150   -consider echo for AR    PULMONARY  #suspected lung cancer w/ mets  CT chest/ abd w/o contrast due to cr 4.5 showing 7cm RLL mass concerning for neoplasm w/ suspected hepatic and adrenal mets and abd LAD on ct  -outpt f.u w/ pulm/ onc for pet vs biopsy    #r/o PNA  CT chest cannot r/o PNA, cxr shows no obvious infiltrate but pt SOB w/ tacypnea and WBC 30, no hypoxia, no fevers, s/p vanc/ cefepime in ED  -sputum cx  -c/w vanc/ cefepime for now to cover broadly as unclear source of infection, see below under ID    #r/o CO poisoning  pt SOB, w/ tachypnea, no hypoxia on pulse ox but inaccurate in CO poisoning, carboxyhemoglobin mildly elevated at 2.6 but not very high  -supplemental O2  -recheck carboxyhemoglobin to ensure stable    RENAL  #uremia  bun ~70, unclear baseline  -emergent HD as above  -monitor bmp    #HUGO vs CKD  Cr 4.5, unknown baseline  -obtain collateral on cr baseline  -emergent HD  -renally dose medications    Metabolic  #AG metabolic acidosis w/ relative resp acidosis (no compensation)  bicarb 11, AG 20, likely 2/2 lactate 2.6 vs azotemia, pH vbg 7.12-->7.08, co2 40 so no compensatory resp alkalosis (no expected hyperventilation), hypoventilation likely 2/2 drugs/ meds vs uremia vs tiring out from increased WOB from prior tachypnea, s/p bicarb gtt and 1 L LR, utox nl  -c/w bicarb drip  -avoid NS if giving fluids to prevent further worsening acidimia  -repeat lactate q4, trend to <2  -renal consult for emergent HD  -monitor ABG  -serum osm for osmolar gap calc    ID  #sirs, r/o sepsis  wbc 30, w/ tachypnea to 22 meets 2/4 sirs crtiria, unclear source, afebrile, lactate 2.6. cxr clear, ct chest cannot r/o consolidation in RLL under mass. ct scan showing perinephritic stranding and mod hydronephrosis concerning for UTI/ nephritis also shows distended gallbladder w/ stones concerning for cholecystitis, lipase wnl  -UA +-->  urine cx  -monitor wbc  -f/u bcx  -RUQ US to r/o cholecystitis    ENDOCRINE  #elevated glucose  glucose 249  -ISS  -hba1c    #hyperkalemia  k 5.7--> now resolved 4.5 s/p insulin/d50 and bicarb drip  -monitor bmp    MSK  #chronic back pain  -s/p ketamine 50mg IVP for back pain    OTHER  F: PO hydration, no NS can give d5 or lr if need ivf  E: replete PRN but cautious in HUGO vs CKD requiring HD  N: renal/ dash diet  DVT ppx: HSQ 5000 q8  other ppx: none required  FULL CODE  dispo: MICU  Ambulate as tolerated 68 y/o M PMH HTN, chronic back pain, s/p radical cystectomy p/w SOB, found to have severe acidemia, admitted to MICU for emergent HD and further workup.    Neurology:   #methadone use  utox  positive for methadone only  -hold methadone in setting of sepsis  -collateral for dosing    CARDIAC  #mild aortic aneurysm  dilation of the aortic root and ascending aorta on ct, no known hx  -control sBP <150   -consider echo for AR    PULMONARY  #suspected lung cancer w/ mets  CT chest/ abd w/o contrast due to cr 4.5 showing 7cm RLL mass concerning for neoplasm w/ suspected hepatic and adrenal mets and abd LAD on ct  -outpt f.u w/ pulm/ onc for pet vs biopsy    #r/o PNA  CT chest cannot r/o PNA, cxr shows no obvious infiltrate but pt SOB w/ tacypnea and WBC 30, no hypoxia, no fevers, s/p vanc/ cefepime in ED  -sputum cx  -c/w vanc/ cefepime for now to cover broadly as unclear source of infection, see below under ID    #r/o CO poisoning  pt SOB, w/ tachypnea, no hypoxia on pulse ox but inaccurate in CO poisoning, carboxyhemoglobin mildly elevated at 2.6 but not very high  -supplemental O2  -recheck carboxyhemoglobin to ensure stable    RENAL  #uremia  bun ~70, unclear baseline  -emergent HD as above  -monitor bmp    #HUGO vs CKD  Cr 4.5, unknown baseline  -obtain collateral on cr baseline  -emergent HD  -renally dose medications    Metabolic  #AG metabolic acidosis w/ relative resp acidosis (no compensation)  bicarb 11, AG 20, likely 2/2 lactate 2.6 vs azotemia, pH vbg 7.12-->7.08, co2 40 so no compensatory resp alkalosis (no expected hyperventilation), hypoventilation likely 2/2 drugs/ meds vs uremia vs tiring out from increased WOB from prior tachypnea, s/p bicarb gtt and 1 L LR, utox nl  -c/w bicarb drip  -avoid NS if giving fluids to prevent further worsening acidimia  -repeat lactate q4, trend to <2  -renal consult for emergent HD  -monitor ABG  -serum osm for osmolar gap calc    ID  #sirs, r/o sepsis  wbc 30, w/ tachypnea to 22 meets 2/4 sirs crtiria, unclear source, afebrile, lactate 2.6. cxr clear, ct chest cannot r/o consolidation in RLL under mass. ct scan showing perinephritic stranding and mod hydronephrosis concerning for UTI/ nephritis also shows distended gallbladder w/ stones concerning for cholecystitis, lipase wnl  -UA +-->  urine cx  -monitor wbc  -f/u bcx  -RUQ US to r/o cholecystitis    ENDOCRINE  #elevated glucose  glucose 249  -ISS  -hba1c    #hyperkalemia  k 5.7--> now resolved 4.5 s/p insulin/d50 and bicarb drip  -monitor bmp    MSK  #chronic back pain  -s/p ketamine 50mg IVP for back pain      #unable to place garcia, likely 2/2 obstruction  -urology consult for garcia placement    OTHER  F: PO hydration, no NS can give d5 or lr if need ivf  E: replete PRN but cautious in HUGO vs CKD requiring HD  N: renal/ dash diet  DVT ppx: HSQ 5000 q8  other ppx: none required  FULL CODE  dispo: MICU  Ambulate as tolerated 70 y/o M PMH HTN, chronic back pain, s/p radical cystectomy p/w SOB, found to have severe acidemia, admitted to MICU for emergent HD and further workup.    Neurology:   #methadone use  utox  positive for methadone only, on 5 qd at home  -c/w methadone 5 mg qd    CARDIAC  #mild aortic aneurysm  dilation of the aortic root and ascending aorta on ct, no known hx  -control sBP <150   -consider echo    PULMONARY  #suspected lung cancer w/ mets  CT chest/ abd w/o contrast due to cr 4.5 showing 7cm RLL mass concerning for neoplasm w/ suspected hepatic and adrenal mets and abd LAD on ct  -outpt f.u w/ pulm/ onc for pet vs biopsy    #r/o PNA  CT chest cannot r/o PNA, cxr shows no obvious infiltrate but pt SOB w/ tachypnea and WBC 30, no hypoxia, no fevers, s/p vanc/ cefepime in ED  -sputum cx  -daily cxr  -covered with vanc zosyn   -f/u bcx    RENAL  #uremia  bun ~70, unclear baseline, renal consulted  -f/u renal recs  -HD in near future  -monitor UO - goal 45cc/hr if not meeting goal than will tell renal need more urgent HD  -HD cath to be placed when needs HD  -monitor bmp    #HUGO vs CKD  Cr 4.5, unknown baseline  -obtain collateral on cr baseline   -renally dose medications  -as above    Metabolic  #AG metabolic acidosis w/ relative resp acidosis (no compensation)  bicarb 11, AG 20, likely 2/2 lactate 2.6 vs azotemia, pH vbg 7.12-->7.08-->improved to 7.37 after bicarb drip and IVF  -c/w IVF w/ LR  -c/w bicarb gtt  -monitor abg  -monitor lactate  -renal consult for HD as above    ID  # sepsis 2/2 pyelonephritis  wbc 30, w/ tachypnea to 22 meets 2/4 sirs crtiria, afebrile, lactate 2.6. cxr clear, ct chest cannot r/o consolidation in RLL under mass. ct scan showing perinephritic stranding and mod hydronephrosis concerning for UTI/ nephritis, also shows distended gallbladder w/ stones concerning for cholecystitis, UA+  - urine cx  -garcia  -bcx  -sputum cx  -monitor wbc  -f/u bcx  -RUQ US to r/o cholecystitis  -vanc/ zosyn for broad coverage     ENDOCRINE  #elevated glucose  glucose 249  -ISS  -hba1c    #hyperkalemia  k 5.7--> now resolved 4.5 s/p insulin/d50 and bicarb drip  -monitor bmp    MSK  #chronic back pain  -s/p ketamine 50mg IVP for back pain      #unable to place garcia, likely 2/2 obstruction  -urology consult for garcia placement    OTHER  F: PO hydration, no NS can give d5 or lr if need ivf  E: replete PRN but cautious in HUGO vs CKD requiring HD  N: renal/ dash diet  DVT ppx: HSQ 5000 q8  other ppx: none required  FULL CODE  dispo: MICU  Ambulate as tolerated 68 y/o M PMH HTN, chronic back pain, s/p radical cystectomy p/w SOB, found to have severe acidemia, admitted to MICU for emergent HD and further workup.    Neurology:   #methadone use  utox  positive for methadone only, on 5 qd at home  -c/w methadone 5 mg qd    CARDIAC  #mild aortic aneurysm  dilation of the aortic root and ascending aorta on ct, no known hx  -control sBP <150   -consider echo    PULMONARY  #suspected lung cancer w/ mets  CT chest/ abd w/o contrast due to cr 4.5 showing 7cm RLL mass concerning for neoplasm w/ suspected hepatic and adrenal mets and abd LAD on ct  -outpt f.u w/ pulm/ onc for pet vs biopsy    #r/o PNA  CT chest cannot r/o PNA, cxr shows no obvious infiltrate but pt SOB w/ tachypnea and WBC 30, no hypoxia, no fevers, s/p vanc/ cefepime in ED  -sputum cx  -daily cxr  -vanc zosyn as below  -f/u bcx    RENAL  #uremia  bun ~70, unclear baseline, renal consulted--> improving ~ 60  -f/u renal recs  -HD in near future  -monitor UO - goal 45cc/hr if not meeting goal than will tell renal need more urgent HD  -HD cath to be placed when needs HD  -monitor bmp    #HUGO vs CKD  Cr 4.5, unknown baseline, improving to ~3  -obtain collateral on cr baseline   -renally dose medications  -as above    Metabolic  #AG metabolic acidosis w/ relative resp acidosis (no compensation)  bicarb 11, AG 20, likely 2/2 lactate 2.6 vs azotemia, pH vbg 7.12-->7.08-->improved to 7.37 after bicarb drip and IVF  -c/w IVF w/ LR (no NS to worsen acidemia)  -c/w bicarb gtt  -monitor abg  -monitor lactate  -renal consult for HD as above    ID  # sepsis 2/2 pyelonephritis  wbc 30--> 23, w/ tachypnea to 22 meets 2/4 sirs crtiria, afebrile, lactate 2.6. cxr clear, ct chest cannot r/o consolidation in RLL under mass. ct scan showing perinephritic stranding and mod hydronephrosis concerning for UTI/ nephritis UA+, also shows distended gallbladder w/ stones concerning for cholecystitis  - urine cx  -bcx  -sputum cx  -monitor wbc  -RUQ US to r/o cholecystitis  -vanc/ zosyn for broad coverage     ENDOCRINE  #elevated glucose  glucose 249  -ISS  -hba1c    #hyperkalemia  k 5.7--> now resolved 4.5 s/p insulin/d50 and bicarb drip  -monitor bmp    MSK  #chronic back pain  -s/p ketamine 50mg IVP for back pain  -dilaudid prn for pain  -c/w methadone 5 qd as above      #unable to place garcia, likely 2/2 obstruction  -urology consulted, f.u recs  -monitor UO from nephrostomy bag    HEME  anemia, normocytic, liekly dilutional s/p IVF anf all cell lines decreased  -monitor CBC    OTHER  F: LR   E: replete PRN but cautious in HUGO vs CKD requiring HD  N: renal/ dash diet  DVT ppx: HSQ 5000 q8  other ppx: none required  FULL CODE  dispo: MICU  Ambulate as tolerated 68 y/o M PMH HTN, chronic back pain, s/p radical cystectomy p/w SOB, found to have severe acidemia, admitted to MICU for emergent HD and further workup.    Neurology:   #methadone use  utox  positive for methadone only, on 5 qd at home  -c/w methadone 5 mg qd    CARDIAC  #mild aortic aneurysm  dilation of the aortic root and ascending aorta on ct, no known hx  -control sBP <150   -consider echo    PULMONARY  #suspected lung cancer w/ mets  CT chest/ abd w/o contrast due to cr 4.5 showing 7cm RLL mass concerning for neoplasm w/ suspected hepatic and adrenal mets and abd LAD on ct  -outpt f.u w/ pulm/ onc for pet vs biopsy    #r/o PNA  CT chest cannot r/o PNA, cxr shows no obvious infiltrate but pt SOB w/ tachypnea and WBC 30, no hypoxia, no fevers, s/p vanc/ cefepime in ED  -sputum cx  -daily cxr  -vanc zosyn as below  -f/u bcx    RENAL  #uremia  bun ~70, unclear baseline, renal consulted--> improving ~ 60  -f/u renal recs  -HD in near future  -monitor UO - goal 45cc/hr if not meeting goal than will tell renal need more urgent HD  -HD cath to be placed when needs HD  -monitor bmp    #HUGO vs CKD  Cr 4.5, unknown baseline, improving to ~3  -obtain collateral on cr baseline   -renally dose medications  -as above    Metabolic  #AG metabolic acidosis w/ relative resp acidosis (no compensation)  bicarb 11, AG 20, likely 2/2 lactate 2.6 vs azotemia, pH vbg 7.12-->7.08-->improved to 7.37 after bicarb drip and IVF  -c/w IVF w/ LR (no NS to worsen acidemia)  -c/w bicarb gtt  -monitor abg  -monitor lactate  -renal consult for HD as above    ID  # sepsis 2/2 pyelonephritis  wbc 30--> 23, w/ tachypnea to 22 meets 2/4 sirs crtiria, afebrile, lactate 2.6. cxr clear, ct chest cannot r/o consolidation in RLL under mass. ct scan showing perinephritic stranding and mod hydronephrosis concerning for UTI/ nephritis UA+, also shows distended gallbladder w/ stones concerning for cholecystitis  - urine cx  -bcx  -sputum cx  -monitor wbc  -RUQ US to r/o cholecystitis  -vanc/ zosyn for broad coverage     ENDOCRINE  #elevated glucose  glucose 249  -ISS  -hba1c    #hyperkalemia  k 5.7--> now resolved 4.5 s/p insulin/d50 and bicarb drip  -monitor bmp    MSK  #chronic back pain  -s/p ketamine 50mg IVP for back pain  -dilaudid prn for pain  -c/w methadone 5 qd as above      #unable to place garcia, likely 2/2 obstruction, urology said no garcia  -urology consulted, f.u recs  -monitor UO from nephrostomy bag    HEME  anemia, normocytic, liekly dilutional s/p IVF anf all cell lines decreased  -monitor CBC    OTHER  F: LR   E: replete PRN but cautious in HUGO vs CKD requiring HD  N: renal/ dash diet  DVT ppx: HSQ 5000 q8  other ppx: none required  FULL CODE  dispo: MICU  Ambulate as tolerated 70 y/o M PMH HTN, chronic back pain, s/p radical cystectomy p/w SOB, found to have severe acidemia, admitted to MICU for emergent HD and further workup.    Neurology:   #methadone use  utox  positive for methadone only, on 5 qd at home  -c/w methadone 5 mg qd    CARDIAC  #mild aortic aneurysm  dilation of the aortic root and ascending aorta on ct, no known hx  -control sBP <150   -consider echo    PULMONARY  #suspected lung cancer w/ mets  CT chest/ abd w/o contrast due to cr 4.5 showing 7cm RLL mass concerning for neoplasm w/ suspected hepatic and adrenal mets and abd LAD on ct  -outpt f.u w/ pulm/ onc for pet vs biopsy    #r/o PNA  CT chest cannot r/o PNA, cxr shows no obvious infiltrate but pt SOB w/ tachypnea and WBC 30, no hypoxia, no fevers, s/p vanc/ cefepime in ED  -sputum cx  -daily cxr  -vanc zosyn as below  -f/u bcx    RENAL  #uremia  bun ~70, unclear baseline, renal consulted--> improving ~ 60  -f/u renal recs  -HD in near future  -monitor UO - goal 45cc/hr if not meeting goal than will tell renal need more urgent HD  -HD cath to be placed when needs HD  -monitor bmp    #HUGO vs CKD  Cr 4.5, unknown baseline, improving to ~3  -obtain collateral on cr baseline   -renally dose medications  -as above    Metabolic  #AG metabolic acidosis w/ relative resp acidosis (no compensation)  bicarb 11, AG 20, likely 2/2 lactate 2.6 vs azotemia, pH vbg 7.12-->7.08-->improved to 7.37 after bicarb drip and IVF  -c/w IVF w/ LR (no NS to worsen acidemia)  -c/w bicarb gtt  -monitor abg  -monitor lactate  -renal consult for HD as above    ID  # sepsis 2/2 pyelonephritis  wbc 30--> 23, w/ tachypnea to 22 meets 2/4 sirs crtiria, afebrile, lactate 2.6. cxr clear, ct chest cannot r/o consolidation in RLL under mass. ct scan showing perinephritic stranding and mod hydronephrosis concerning for UTI/ nephritis UA+, also shows distended gallbladder w/ stones concerning for cholecystitis  - urine cx  -bcx  -sputum cx  -monitor wbc  -RUQ US to r/o cholecystitis  -vanc/ zosyn for broad coverage     ENDOCRINE  #elevated glucose  glucose 249  -ISS  -hba1c    #hyperkalemia  k 5.7--> now resolved 4.5 s/p insulin/d50 and bicarb drip  -monitor bmp    MSK  #chronic back pain  -s/p ketamine 50mg IVP for back pain  -dilaudid prn for pain  -c/w methadone 5 qd as above      #unable to place garcia, likely 2/2 obstruction, urology said no garcia  -urology consulted, f.u recs  -monitor UO from nephrostomy bag    HEME  anemia, normocytic, liekly dilutional s/p IVF anf all cell lines decreased  -monitor CBC    OTHER  F: LR   E: replete PRN but cautious in HUGO vs CKD requiring HD  N: npo  DVT ppx: HSQ 5000 q8  other ppx: none required  FULL CODE  dispo: MICU  Ambulate as tolerated 70 y/o M PMH HTN, chronic back pain, s/p radical cystectomy p/w SOB, found to have severe acidemia, admitted to MICU for emergent HD and further workup.    Neurology:   #methadone use  utox  positive for methadone only, on 5 qd at home  -c/w methadone 5 mg qd    CARDIAC  #mild aortic aneurysm  dilation of the aortic root and ascending aorta on ct, no known hx  -control sBP <150   -consider echo    PULMONARY  #suspected lung cancer w/ mets  CT chest/ abd w/o contrast due to cr 4.5 showing 7cm RLL mass concerning for neoplasm w/ suspected hepatic and adrenal mets and abd LAD on ct  -outpt f.u w/ pulm/ onc for pet vs biopsy    #r/o PNA  CT chest cannot r/o PNA, cxr shows no obvious infiltrate but pt SOB w/ tachypnea and WBC 30, no hypoxia, no fevers, s/p vanc/ cefepime in ED  -sputum cx  -daily cxr  -vanc zosyn as below  -f/u bcx    RENAL  #uremia  bun ~70, unclear baseline, renal consulted--> improving ~ 60  -f/u renal recs  -HD in near future  -monitor UO - goal 45cc/hr if not meeting goal than will tell renal need more urgent HD  -HD cath to be placed when needs HD  -monitor bmp    #HUGO vs CKD  Cr 4.5, unknown baseline, improving to ~3  -obtain collateral on cr baseline   -renally dose medications  -as above    Metabolic  #AG metabolic acidosis w/ relative resp acidosis (no compensation)  bicarb 11, AG 20, likely 2/2 lactate 2.6 vs azotemia, pH vbg 7.12-->7.08-->improved to 7.37 after bicarb drip and IVF  -c/w IVF w/ LR (no NS to worsen acidemia)  -c/w bicarb gtt  -monitor abg  -monitor lactate  -renal consult for HD as above    ID  # sepsis 2/2 pyelonephritis  wbc 30--> 23, w/ tachypnea to 22 meets 2/4 sirs crtiria, afebrile, lactate 2.6. cxr clear, ct chest cannot r/o consolidation in RLL under mass. ct scan showing perinephritic stranding and mod hydronephrosis concerning for UTI/ nephritis UA+, also shows distended gallbladder w/ stones concerning for cholecystitis  - urine cx  -bcx  -sputum cx  -monitor wbc  -RUQ US to r/o cholecystitis  -vanc/ zosyn for broad coverage   -wound care for skin wound over abdomen  -vanc to cover possible cellulitis of abdomen wound    ENDOCRINE  #elevated glucose  glucose 249  -ISS  -hba1c    #hyperkalemia  k 5.7--> now resolved 4.5 s/p insulin/d50 and bicarb drip  -monitor bmp    MSK  #chronic back pain  -s/p ketamine 50mg IVP for back pain  -dilaudid prn for pain  -c/w methadone 5 qd as above      #unable to place garcia, likely 2/2 obstruction, urology said no garcia  -urology consulted, f.u recs  -monitor UO from nephrostomy bag    HEME  anemia, normocytic, liekly dilutional s/p IVF anf all cell lines decreased  -monitor CBC    OTHER  F: LR   E: replete PRN but cautious in HUGO vs CKD requiring HD  N: npo  DVT ppx: HSQ 5000 q8  other ppx: none required  FULL CODE  dispo: MICU  Ambulate as tolerated 68 y/o M PMH HTN, chronic back pain, s/p radical cystectomy p/w SOB, found to have severe metabolic and respiratory acidemia, urosepsis with likely left pyelonephritis, with likely acute ATN on CKD, severe dehydration, likely metastatic cancer, admitted to MICU for emergent HD and further workup.    Neurology:   #methadone use  utox  positive for methadone only, on 5 qd at home  -c/w methadone 5 mg qd    CARDIAC  #mild aortic aneurysm  dilation of the aortic root and ascending aorta on ct, no known hx  -control sBP <150   -consider echo    PULMONARY  #suspected lung cancer w/ mets  CT chest/ abd w/o contrast due to cr 4.5 showing 7cm RLL mass concerning for neoplasm w/ suspected hepatic and adrenal mets and abd LAD on ct  -outpt f.u w/ pulm/ onc for pet vs biopsy    #r/o PNA  CT chest cannot r/o PNA, cxr shows no obvious infiltrate but pt SOB w/ tachypnea and WBC 30, no hypoxia, no fevers, s/p vanc/ cefepime in ED  -sputum cx  -daily cxr  -vanc zosyn as below  -f/u bcx    RENAL  #uremia  bun ~70, unclear baseline, renal consulted--> improving ~ 60  -f/u renal recs  -HD in near future  -monitor UO - goal 45cc/hr if not meeting goal than will tell renal need more urgent HD  -HD cath to be placed when needs HD  -monitor bmp    #HUGO vs CKD  Cr 4.5, unknown baseline, improving to ~3 with likely ATN  -obtain collateral on cr baseline   -renally dose medications  -as above    #hydronephrosis  -urology consultation    Metabolic  #AG metabolic acidosis w/ relative resp acidosis (no compensation)  bicarb 11, AG 20, likely 2/2 lactate 2.6 vs azotemia, pH vbg 7.12-->7.08-->improved to 7.37 after bicarb drip and IVF  -c/w IVF w/ LR (no NS to worsen acidemia)  -c/w bicarb gtt  -monitor abg  -monitor lactate  -renal consult for HD as above    ID  # sepsis 2/2 pyelonephritis  wbc 30--> 23, w/ tachypnea to 22 meets 2/4 sirs crtiria, afebrile, lactate 2.6. cxr clear, ct chest cannot r/o consolidation in RLL under mass. ct scan showing perinephritic stranding and mod hydronephrosis concerning for UTI/ nephritis UA+, also shows distended gallbladder w/ stones concerning for cholecystitis  - urine cx  -bcx  -sputum cx  -monitor wbc  -RUQ US to r/o cholecystitis  -vanc/ zosyn for broad coverage   -wound care for skin wound over abdomen  -vanc to cover possible cellulitis of abdomen wound    ENDOCRINE  #elevated glucose  glucose 249  -ISS  -hba1c    #hyperkalemia  k 5.7--> now resolved 4.5 s/p insulin/d50 and bicarb drip  -monitor bmp    MSK  #chronic back pain  -s/p ketamine 50mg IVP for back pain  -dilaudid prn for pain  -c/w methadone 5 qd as above      #unable to place garcia, likely 2/2 obstruction, urology said no garcia  -urology consulted, f.u recs  -monitor UO from nephrostomy bag    HEME  anemia, normocytic, liekly dilutional s/p IVF anf all cell lines decreased  -monitor CBC    OTHER  F: LR   E: replete PRN but cautious in HUGO vs CKD requiring HD  N: npo  DVT ppx: HSQ 5000 q8  other ppx: none required  FULL CODE  dispo: MICU  Ambulate as tolerated

## 2019-06-13 NOTE — PATIENT PROFILE ADULT - SURGICAL SITE DESCRIPTION
11x5 cm. Oozing small amt. green drainage from the top. Covered in old, crusty blood. it is an old wound, but he can't even give me a general idea of how old it is. "it's been years."

## 2019-06-13 NOTE — ED PROVIDER NOTE - NOTES
Recommends admission/transfer to [facility], cannot accept/admit patients at [facility]

## 2019-06-13 NOTE — ED ADULT NURSE NOTE - NSIMPLEMENTINTERV_GEN_ALL_ED
Implemented All Fall with Harm Risk Interventions:  [place] to call system. Call bell, personal items and telephone within reach. Instruct patient to call for assistance. Room bathroom lighting operational. Non-slip footwear when patient is off stretcher. Physically safe environment: no spills, clutter or unnecessary equipment. Stretcher in lowest position, wheels locked, appropriate side rails in place. Provide visual cue, wrist band, yellow gown, etc. Monitor gait and stability. Monitor for mental status changes and reorient to person, place, and time. Review medications for side effects contributing to fall risk. Reinforce activity limits and safety measures with patient and family. Provide visual clues: red socks.

## 2019-06-13 NOTE — H&P ADULT - NSICDXPASTSURGICALHX_GEN_ALL_CORE_FT
PAST SURGICAL HISTORY:  H/O total cystectomy PAST SURGICAL HISTORY:  H/O total cystectomy     S/P colostomy

## 2019-06-13 NOTE — CONSULT NOTE ADULT - ASSESSMENT
patient is a poor historian, therefore detailed information could not be obtained.  patient is a 69 y o male with pmh of radical cystectomy wwho was transferred from  for further medical care.   Upon presentation, patient was found to have bun/cr 73/4.7, K @ 5.7 and bicarb @ 11.  Nephrology consult is placed in regards to abnormal kidney function along with acidosis.    Po  urine output to be determined  DDX: prerenal in setting of hypotension vs intrinsic (ATN ( given concern for sepsis) , AIn and AGN ) and post renal  CT scan--> r renal atrophy with moderate hydronephrosis.  UA noted  please check urine lytes: na, cr, urea  volume status--> dry--> s/p 2 L NS bolus, recommend continuation with maintenance fluid  LR @ 100-150 cc/hr  in light of hydronephrosis--> recommend urology evaluation  monitor urine output  renally dose abx      acidosis patient is a poor historian, therefore detailed information could not be obtained.  patient is a 69 y o male with pmh of radical cystectomy wwho was transferred from  for further medical care.   Upon presentation, patient was found to have bun/cr 73/4.7, K @ 5.7 and bicarb @ 11.  Nephrology consult is placed in regards to abnormal kidney function along with acidosis.    Po  urine output to be determined   DDX: prerenal in setting of hypotension and with cr improving with IVF  vs intrinsic (ATN ( given concern for sepsis) , AIn and AGN ) and post renal  CT scan--> r renal atrophy with moderate hydronephrosis.  UA noted  please check urine lytes: na, cr, urea  volume status--> dry--> s/p 2 L NS bolus, recommend continuation with maintenance fluid  LR @ 100-150 cc/hr  in light of hydronephrosis--> recommend urology evaluation  monitor urine output via urostomy bag  renally dose abx      acidosis  DDX: uremia and lactic acidosis  delta/delta 1.39  s/p bicarbonate pushes in light of VBG  repeat ABG @ 7.37/34/83/19  no need of bicarbonate drip  continue monitoring for now, trend lactate    proteinuria  check urine pr/cr

## 2019-06-13 NOTE — PATIENT PROFILE ADULT - NSTOBACCO QUIT READY_GEN_A_CORE_SD
He's not able to give any detailed hx of drugs and Etoh at this time. Not an appropriate discussion at this time not motivated to quit/He's not able to give any detailed hx of drugs and Etoh at this time. Not an appropriate discussion at this time

## 2019-06-14 NOTE — PROGRESS NOTE ADULT - SUBJECTIVE AND OBJECTIVE BOX
Patient is a 69y old  Male who presents with a chief complaint of acidemia (2019 11:35)      INTERVAL HPI/OVERNIGHT EVENTS: Pt seen and examined at bedside. Very agitated and dessatting to high 80s-90. S/p intubation for airway protection and tiring out, unable top compensate met acidosis.       ICU Vital Signs Last 24 Hrs  T(C): 36.6 (2019 10:52), Max: 36.9 (2019 13:34)  T(F): 97.8 (2019 10:52), Max: 98.5 (2019 21:58)  HR: 86 (2019 12:00) (46 - 100)  BP: 122/54 (2019 03:00) (72/43 - 128/70)  BP(mean): 78 (2019 03:00) (52 - 100)  ABP: 90/42 (2019 12:00) (90/42 - 146/66)  ABP(mean): 58 (2019 12:00) (58 - 94)  RR: 12 (2019 12:00) (12 - 44)  SpO2: 99% (2019 12:00) (82% - 100%)    I&O's Summary    2019 07:  -  2019 07:00  --------------------------------------------------------  IN: 4716 mL / OUT: 1500 mL / NET: 3216 mL    2019 07:  -  2019 12:49  --------------------------------------------------------  IN: 75 mL / OUT: 280 mL / NET: -205 mL      Mode: AC/ CMV (Assist Control/ Continuous Mandatory Ventilation)  RR (machine): 12  TV (machine): 550  FiO2: 70  PEEP: 5  ITime: 1  MAP: 12  PIP: 30      LABS:                        11.3   23.38 )-----------( 367      ( 2019 05:57 )             35.1     06-14    137  |  105  |  53<H>  ----------------------------<  154<H>  4.7   |  18<L>  |  3.20<H>    Ca    7.7<L>      2019 05:57  Phos  5.3     -  Mg     1.6     -    TPro  6.6  /  Alb  2.8<L>  /  TBili  0.4  /  DBili  x   /  AST  13  /  ALT  8<L>  /  AlkPhos  115  -14    PT/INR - ( 2019 18:00 )   PT: 16.8 sec;   INR: 1.47          PTT - ( 2019 18:00 )  PTT:29.1 sec  Urinalysis Basic - ( 2019 16:17 )    Color: Yellow / Appearance: Clear / S.020 / pH: x  Gluc: x / Ketone: NEGATIVE  / Bili: NEGATIVE / Urobili: 0.2 E.U./dL   Blood: x / Protein: >=300 mg/dL / Nitrite: NEGATIVE   Leuk Esterase: Moderate / RBC: > 10 /HPF / WBC Many /HPF   Sq Epi: x / Non Sq Epi: 0-5 /HPF / Bacteria: Present /HPF      CAPILLARY BLOOD GLUCOSE      POCT Blood Glucose.: 129 mg/dL (2019 12:18)  POCT Blood Glucose.: 165 mg/dL (2019 06:23)  POCT Blood Glucose.: 151 mg/dL (2019 21:25)  POCT Blood Glucose.: 187 mg/dL (2019 17:19)    ABG - ( 2019 10:16 )  pH, Arterial: 7.23  pH, Blood: x     /  pCO2: 46    /  pO2: 66    / HCO3: 19    / Base Excess: -8.4  /  SaO2: 90                  RADIOLOGY & ADDITIONAL TESTS:    Consultant(s) Notes Reviewed:  [x ] YES  [ ] NO    MEDICATIONS  (STANDING):  ALBUTerol/ipratropium for Nebulization 3 milliLiter(s) Nebulizer every 6 hours  chlorhexidine 4% Liquid 1 Application(s) Topical <User Schedule>  dextrose 5%. 1000 milliLiter(s) (50 mL/Hr) IV Continuous <Continuous>  dextrose 50% Injectable 12.5 Gram(s) IV Push once  dextrose 50% Injectable 25 Gram(s) IV Push once  dextrose 50% Injectable 25 Gram(s) IV Push once  furosemide   Injectable 80 milliGRAM(s) IV Push once  haloperidol    Injectable 2 milliGRAM(s) IV Push once  heparin  Injectable 7500 Unit(s) SubCutaneous every 8 hours  insulin lispro (HumaLOG) corrective regimen sliding scale   SubCutaneous Before meals and at bedtime  methadone    Tablet 5 milliGRAM(s) Oral daily  norepinephrine Infusion 0.05 MICROgram(s)/kG/Min (8.972 mL/Hr) IV Continuous <Continuous>  piperacillin/tazobactam IVPB. 2.25 Gram(s) IV Intermittent every 8 hours  vancomycin  IVPB 1250 milliGRAM(s) IV Intermittent every 24 hours    MEDICATIONS  (PRN):  dextrose 40% Gel 15 Gram(s) Oral once PRN Blood Glucose LESS THAN 70 milliGRAM(s)/deciliter  glucagon  Injectable 1 milliGRAM(s) IntraMuscular once PRN Glucose LESS THAN 70 milligrams/deciliter  haloperidol    Injectable 2 milliGRAM(s) IV Push once PRN agitation  HYDROmorphone  Injectable 0.5 milliGRAM(s) IV Push every 4 hours PRN Severe Pain (7 - 10)    PE:  Constitutional: overweight male agitated and flailing his arms and groaning, unable to communicate what is wrong  	Head: NC/AT  	Eyes: PERRL, EOMI, clear conjunctiva  	ENT: no nasal discharge; uvula midline, no oropharyngeal erythema or exudates; bipap mask on  	Neck: supple; no JVD or thyromegaly  	Respiratory: diffuse rhonchi  	Cardiac: +S1/S2; RRR; no M/R/G;   	Gastrointestinal: distended abd with hard bulges, mildly TTP w/o rebound or guarding, nephrostomy bag in place in abdomen, dark red/brown large scab/ wound over central abdomen   	Extremities:  scaling grey skin in LEs, no edema  	Musculoskeletal: NROM x4; no joint swelling, tenderness or erythema  	:  nephrostomy draining urine from center of belly  Neurologic: awake and alert but unable to communicate, agitated Patient is a 69y old  Male who presents with a chief complaint of acidemia (2019 11:35)      INTERVAL HPI/OVERNIGHT EVENTS: Pt seen and examined at bedside. Very agitated and dessatting to high 80s-90. S/p intubation for airway protection and tiring out, unable top compensate met acidosis. Collateral obtained from partner.      ICU Vital Signs Last 24 Hrs  T(C): 36.6 (2019 10:52), Max: 36.9 (2019 13:34)  T(F): 97.8 (2019 10:52), Max: 98.5 (2019 21:58)  HR: 86 (2019 12:00) (46 - 100)  BP: 122/54 (2019 03:00) (72/43 - 128/70)  BP(mean): 78 (2019 03:00) (52 - 100)  ABP: 90/42 (2019 12:00) (90/42 - 146/66)  ABP(mean): 58 (2019 12:00) (58 - 94)  RR: 12 (2019 12:00) (12 - 44)  SpO2: 99% (2019 12:00) (82% - 100%)    I&O's Summary    2019 07:01  -  2019 07:00  --------------------------------------------------------  IN: 4716 mL / OUT: 1500 mL / NET: 3216 mL    2019 07:01  -  2019 12:49  --------------------------------------------------------  IN: 75 mL / OUT: 280 mL / NET: -205 mL      Mode: AC/ CMV (Assist Control/ Continuous Mandatory Ventilation)  RR (machine): 12  TV (machine): 550  FiO2: 70  PEEP: 5  ITime: 1  MAP: 12  PIP: 30      LABS:                        11.3   23.38 )-----------( 367      ( 2019 05:57 )             35.1     -14    137  |  105  |  53<H>  ----------------------------<  154<H>  4.7   |  18<L>  |  3.20<H>    Ca    7.7<L>      2019 05:57  Phos  5.3       Mg     1.6         TPro  6.6  /  Alb  2.8<L>  /  TBili  0.4  /  DBili  x   /  AST  13  /  ALT  8<L>  /  AlkPhos  115  -14    PT/INR - ( 2019 18:00 )   PT: 16.8 sec;   INR: 1.47          PTT - ( 2019 18:00 )  PTT:29.1 sec  Urinalysis Basic - ( 2019 16:17 )    Color: Yellow / Appearance: Clear / S.020 / pH: x  Gluc: x / Ketone: NEGATIVE  / Bili: NEGATIVE / Urobili: 0.2 E.U./dL   Blood: x / Protein: >=300 mg/dL / Nitrite: NEGATIVE   Leuk Esterase: Moderate / RBC: > 10 /HPF / WBC Many /HPF   Sq Epi: x / Non Sq Epi: 0-5 /HPF / Bacteria: Present /HPF      CAPILLARY BLOOD GLUCOSE      POCT Blood Glucose.: 129 mg/dL (2019 12:18)  POCT Blood Glucose.: 165 mg/dL (2019 06:23)  POCT Blood Glucose.: 151 mg/dL (2019 21:25)  POCT Blood Glucose.: 187 mg/dL (2019 17:19)    ABG - ( 2019 10:16 )  pH, Arterial: 7.23  pH, Blood: x     /  pCO2: 46    /  pO2: 66    / HCO3: 19    / Base Excess: -8.4  /  SaO2: 90                  RADIOLOGY & ADDITIONAL TESTS:    Consultant(s) Notes Reviewed:  [x ] YES  [ ] NO    MEDICATIONS  (STANDING):  ALBUTerol/ipratropium for Nebulization 3 milliLiter(s) Nebulizer every 6 hours  chlorhexidine 4% Liquid 1 Application(s) Topical <User Schedule>  dextrose 5%. 1000 milliLiter(s) (50 mL/Hr) IV Continuous <Continuous>  dextrose 50% Injectable 12.5 Gram(s) IV Push once  dextrose 50% Injectable 25 Gram(s) IV Push once  dextrose 50% Injectable 25 Gram(s) IV Push once  furosemide   Injectable 80 milliGRAM(s) IV Push once  haloperidol    Injectable 2 milliGRAM(s) IV Push once  heparin  Injectable 7500 Unit(s) SubCutaneous every 8 hours  insulin lispro (HumaLOG) corrective regimen sliding scale   SubCutaneous Before meals and at bedtime  methadone    Tablet 5 milliGRAM(s) Oral daily  norepinephrine Infusion 0.05 MICROgram(s)/kG/Min (8.972 mL/Hr) IV Continuous <Continuous>  piperacillin/tazobactam IVPB. 2.25 Gram(s) IV Intermittent every 8 hours  vancomycin  IVPB 1250 milliGRAM(s) IV Intermittent every 24 hours    MEDICATIONS  (PRN):  dextrose 40% Gel 15 Gram(s) Oral once PRN Blood Glucose LESS THAN 70 milliGRAM(s)/deciliter  glucagon  Injectable 1 milliGRAM(s) IntraMuscular once PRN Glucose LESS THAN 70 milligrams/deciliter  haloperidol    Injectable 2 milliGRAM(s) IV Push once PRN agitation  HYDROmorphone  Injectable 0.5 milliGRAM(s) IV Push every 4 hours PRN Severe Pain (7 - 10)    PE:  Constitutional: overweight male agitated and flailing his arms and groaning, unable to communicate what is wrong  	Head: NC/AT  	Eyes: PERRL, EOMI, clear conjunctiva  	ENT: no nasal discharge; uvula midline, no oropharyngeal erythema or exudates; bipap mask on  	Neck: supple; no JVD or thyromegaly  	Respiratory: diffuse rhonchi  	Cardiac: +S1/S2; RRR; no M/R/G;   	Gastrointestinal: distended abd with hard bulges, mildly TTP w/o rebound or guarding, nephrostomy bag in place in abdomen, dark red/brown large scab/ wound over central abdomen   	Extremities:  scaling grey skin in LEs, no edema  	Musculoskeletal: NROM x4; no joint swelling, tenderness or erythema  	:  nephrostomy draining urine from center of belly  Neurologic: awake and alert but unable to communicate, agitated

## 2019-06-14 NOTE — DIETITIAN INITIAL EVALUATION ADULT. - OTHER INFO
70 yo/male with PMHx HTN, chronic back pain, s/p radical cystectomy, nephrostomy in remaining kidney, admitted with SOB and found to have severe acidemia. CT chest c/f lung cancer w/mets. Pt seen in room and discussed during MICU rounds. MAP 70, levophed running, being titrated down. Pt breathing on aerosol mask initially, and later during rounds ultimately intubated d/t inability to protect airway. Was unable to obtain information from pt while on mask, as he was too agitated. NPO at this time. NKFA. Skin noted with midline wound, unclear what from. Also noted with ileal conduit w/urine. Will continue to follow per RD protocol.

## 2019-06-14 NOTE — CONSULT NOTE ADULT - PROBLEM SELECTOR RECOMMENDATION 2
Full Code.  Support provided to pt and family/partner who states he has a lot of medical issues of his own, i.e. treated lung ca with RT, cardiac issues with stents, and etc.  Patient to have access to supportive services during rest of hospital stay as the pt/family deems necessary i.e. Chaplaincy, Massage Therapy, Music Therapy, Pt/family supportive services, Palliative SW, etc.  PSSA screening is pending    -suggest further collateral from pt's PMD, Dr. Zak Courtney [phone] and "Pain Specialist" Dr. Ki Kwan [phone]

## 2019-06-14 NOTE — PROGRESS NOTE ADULT - ASSESSMENT
Non oliguric HUGO on un unknown prior baseline   DX: prerenal in setting of hypotension and with cr improving with IVF  vs intrinsic (ATN ( given concern for sepsis) , AIN ) and post renal  CT scan--> r renal atrophy with moderate hydronephrosis.  Obtain repeat urinalysis, Urine PCR   Received IV fluid yesterday with net positive fluid balance in past 24 hours  Volume status hypervolemic with pleural effusion  Continue Respiratory management per ICU team   IV diuresis with Iasix IV along with IV albumin   Consider pleural tap if indicated.  Urology team following for ileal conduit and urostomy  monitor urine output via urostomy bag  Adjust antibiotics as per renal dose clearance  Monitor BMP every 12 hrly  No urgent need for RRT/HD at present.    Respiratory acidosis with aniongap metabolic acidosis likely due to underlying CASEY/lung pathology/renal failure  ABG with pH 7.23 with bicarboante 18  Can not receive IV bicarbonate drip due to volume overload with pleural effusion requiring BIPAP  Advised IV lasix with IV albumin  Add po sodium bicarbonate 650mg tid   Lactate 1.8    # Renal bone disease  -Calcium 7.7 and Phos 5.3  Obtain intact PTH, VItamin D 25, VItamin D 1,25 level  Monitor calcium and phos level

## 2019-06-14 NOTE — PROGRESS NOTE ADULT - ASSESSMENT
70 y/o M PMH HTN, chronic back pain, s/p radical cystectomy p/w SOB, found to have severe acidemia, admitted to MICU for emergent HD and further workup.    Neurology:   #methadone use  utox  positive for methadone only, on 5 qd at home  -c/w methadone 5 mg qd    #agitation  -sedation and intubation until clinically improves    CARDIAC  #mild aortic aneurysm  dilation of the aortic root and ascending aorta on ct, no known hx  -control sBP <150   -consider echo    PULMONARY  #lung cancer w/ mets  CT chest/ abd w/o contrast due to cr 4.5 showing 7cm RLL mass concerning for neoplasm w/ suspected hepatic and adrenal mets and abd LAD on ct  -obtain collateral about hx of cancer/ treatment from partner or pmd  -outpt f/u  -pal care consult  -venous dopplers b/l to r/o dvt    #COPD  emphysema on CT scan of chest, suspect resp acidosis 2/2 COPD that is chronic and preventing compensation of met acidosis  -intubation to decrease WOB and increase RR if needed to compensate for me acidosis  -duonebs q6 standing   -bipap overnight when off intubation    #r/o PNA  CT chest cannot r/o PNA, cxr shows no obvious infiltrate but pt SOB w/ tachypnea and WBC 30, no hypoxia, no fevers, s/p vanc/ cefepime in ED--> vanc and zosyn  -sputum cx f/u  -daily cxr  -vanc zosyn as below  -f/u bcx- ngtd    RENAL  #uremia  bun ~70, unclear baseline, renal consulted--> improving 53  -f/u renal recs  -q12 bmp  -HD in near future  -monitor UO - goal 45cc/hr if not meeting goal than will tell renal need more urgent HD  -HD cath to be placed when needs HD    #HUGO vs CKD  Cr 4.5, unknown baseline, improving to ~3  -obtain collateral on cr baseline   -renally dose medications  -as above    Metabolic  #AG metabolic acidosis uncompnesated  likely 2/2 uremia, pH vbg 7.12-->7.08-->improved to 7.37 after bicarb drip  and IVF LR (turned off bc fluid overloaded) but then decreased again after bicarb gtt off  -monitor abg, has a-line  -renal consult as above  -intubated as above for resp compensation (high vt and rr)    ID  # sepsis 2/2 pyelonephritis  wbc 30--> 23, w/ tachypnea to 22 meets 2/4 sirs crtiria, afebrile, lactate 2.6. cxr clear, ct chest cannot r/o consolidation in RLL under mass. ct scan showing perinephritic stranding and mod hydronephrosis concerning for UTI/ nephritis UA+, also shows distended gallbladder w/ stones concerning for cholecystitis  - urine cx  -bcx  -sputum cx  -monitor wbc  -RUQ US to r/o cholecystitis  -vanc/ zosyn for broad coverage   -wound care for skin wound over abdomen  -vanc to cover possible cellulitis of abdomen wound vs pna    ENDOCRINE  #elevated glucose  glucose 249, a1c 7.4  -ISS  -adjust if needed    #hyperkalemia  k 5.7--> now resolved 4.5 s/p insulin/d50 and bicarb drip  -monitor bmp    MSK  #chronic back pain  s/p ketamine 50mg IVP for back pain  -sedated      #cystectomy with nephrostomy  ct showing mod hydro but draining urine, R kidney atrophy  -urology consulted, f.u recs  -monitor UO from nephrostomy bag    HEME  anemia, normocytic, liekly dilutional s/p IVF anf all cell lines decreased, cbc stable  -monitor CBC    OTHER  F: none  E: replete PRN but cautious in HUGO vs CKD requiring HD  N: npo  DVT ppx: HSQ 5000 q8  other ppx: ppi  FULL CODE  dispo: MICU  Ambulate as tolerated 68 y/o M PMH HTN, chronic back pain, s/p radical cystectomy p/w SOB, found to have severe acidemia, admitted to MICU for emergent HD and further workup.    Neurology:   #methadone use  utox  positive for methadone only, on 5 qd at home  -c/w methadone 5 mg qd    #agitation  -sedation and intubation until clinically improves    #AMS  likely 2/2 uremia vs sepsis  -intubated and sedated  -tx underlying cause as below    CARDIAC  #mild aortic aneurysm  dilation of the aortic root and ascending aorta on ct, no known hx  -control sBP <150   -consider echo    #hypotension  sepsis vs. cardiogenic vs 2/2 opiates  -c/w levophed for now, titrate as BP toelrates    PULMONARY  #lung cancer w/ mets  CT chest/ abd w/o contrast due to cr 4.5 showing 7cm RLL mass concerning for neoplasm w/ suspected hepatic and adrenal mets and abd LAD on ct  -obtain collateral about hx of cancer/ treatment from partner or pmd  -outpt f/u  -pal care consult  -venous dopplers b/l to r/o dvt    #COPD  emphysema on CT scan of chest, suspect resp acidosis 2/2 COPD that is chronic and preventing compensation of met acidosis  -intubation to decrease WOB and increase RR if needed to compensate for me acidosis  -duonebs q6 standing   -bipap overnight when off intubation    #r/o PNA  CT chest cannot r/o PNA, cxr shows no obvious infiltrate but pt SOB w/ tachypnea and WBC 30, no hypoxia, no fevers, s/p vanc/ cefepime in ED--> vanc and zosyn  -sputum cx f/u  -daily cxr  -vanc zosyn as below  -f/u bcx- ngtd    RENAL  #uremia  bun ~70, unclear baseline, renal consulted--> improving 53  -f/u renal recs  -q12 bmp  -HD in near future  -monitor UO - goal 45cc/hr if not meeting goal than will tell renal need more urgent HD  -HD cath to be placed when needs HD    #HUGO vs CKD  Cr 4.5, unknown baseline, improving to ~3  -obtain collateral on cr baseline   -renally dose medications  -as above    Metabolic  #AG metabolic acidosis uncompnesated  likely 2/2 uremia, pH vbg 7.12-->7.08-->improved to 7.37 after bicarb drip  and IVF LR (turned off bc fluid overloaded) but then decreased again after bicarb gtt off  -monitor abg, has a-line  -renal consult as above  -intubated as above for resp compensation (high vt and rr)    ID  # sepsis 2/2 pyelonephritis  wbc 30--> 23, w/ tachypnea to 22 meets 2/4 sirs crtiria, afebrile, lactate 2.6. cxr clear, ct chest cannot r/o consolidation in RLL under mass. ct scan showing perinephritic stranding and mod hydronephrosis concerning for UTI/ nephritis UA+, also shows distended gallbladder w/ stones concerning for cholecystitis  - urine cx  -bcx  -sputum cx  -monitor wbc  -RUQ US to r/o cholecystitis  -vanc/ zosyn for broad coverage   -wound care for skin wound over abdomen  -vanc to cover possible cellulitis of abdomen wound vs pna    ENDOCRINE  #elevated glucose  glucose 249, a1c 7.4  -ISS  -adjust if needed    #hyperkalemia  k 5.7--> now resolved 4.5 s/p insulin/d50 and bicarb drip  -monitor bmp    MSK  #chronic back pain  s/p ketamine 50mg IVP for back pain  -sedated      #cystectomy with nephrostomy  ct showing mod hydro but draining urine, R kidney atrophy  -urology consulted, f.u recs  -monitor UO from nephrostomy bag    HEME  anemia, normocytic, liekly dilutional s/p IVF anf all cell lines decreased, cbc stable  -monitor CBC    OTHER  F: none  E: replete PRN but cautious in HUGO vs CKD requiring HD  N: npo  DVT ppx: HSQ 5000 q8  other ppx: ppi  FULL CODE  dispo: MICU  Ambulate as tolerated 68 y/o M PMH smoker (50 years and current), sleep apnea w/o cpap, short-term mem loss, HTN, DM2, chronic back pain s/p multiple back surgeries w/ hardware on methadone, anxiety, SBO s/p sx c/b wound that never healed, prostate cancer 1999 s/p radiation c/b radical cystectomy and nephrostomy w/ ileal conduit (in remission), p/w SOB, found to have severe acidemia, new onset suspected lung cancer w/ mets, admitted to MICU for emergent HD and further workup.    Neurology:   #methadone use  utox  positive for methadone only, on 5 qd 2-4 per day at home  -c/w methadone 10-20 mg qd    #agitation  -sedation and intubation until clinically improves    #AMS  likely 2/2 uremia vs sepsis  -intubated and sedated  -tx underlying cause as below    CARDIAC  #mild aortic aneurysm  dilation of the aortic root and ascending aorta on ct, no known hx  -control sBP <150   -consider echo    #hypotension  sepsis vs. cardiogenic vs 2/2 opiates  -c/w levophed for now, titrate as BP toelrates    PULMONARY  #lung cancer w/ mets  CT chest/ abd w/o contrast due to cr 4.5 showing 7cm RLL mass concerning for neoplasm w/ suspected hepatic and adrenal mets and abd LAD on ct  -obtain collateral about hx of cancer/ treatment from partner or pmd  -outpt f/u  -pal care consult  -venous dopplers b/l to r/o dvt    #smoker   50 years and current, no current    #COPD  emphysema on CT scan of chest, suspect resp acidosis 2/2 COPD that is chronic and preventing compensation of met acidosis  -intubation to decrease WOB and increase RR if needed to compensate for me acidosis  -duonebs q6 standing   -bipap overnight when off intubation    #r/o PNA  CT chest cannot r/o PNA, cxr shows no obvious infiltrate but pt SOB w/ tachypnea and WBC 30, no hypoxia, no fevers, s/p vanc/ cefepime in ED--> vanc and zosyn  -sputum cx f/u  -daily cxr  -vanc zosyn as below  -f/u bcx- ngtd    RENAL  #uremia  bun ~70, unclear baseline, renal consulted--> improving 53  -f/u renal recs  -q12 bmp  -HD in near future  -monitor UO - goal 45cc/hr if not meeting goal than will tell renal need more urgent HD  -HD cath to be placed when needs HD    #HUGO vs CKD  Cr 4.5, unknown baseline, improving to ~3  -obtain collateral on cr baseline   -renally dose medications  -as above    Metabolic  #AG metabolic acidosis uncompnesated  likely 2/2 uremia, pH vbg 7.12-->7.08-->improved to 7.37 after bicarb drip  and IVF LR (turned off bc fluid overloaded) but then decreased again after bicarb gtt off  -monitor abg, has a-line  -renal consult as above  -intubated as above for resp compensation (high vt and rr)    ID  # sepsis 2/2 pyelonephritis  wbc 30--> 23, w/ tachypnea to 22 meets 2/4 sirs crtiria, afebrile, lactate 2.6. cxr clear, ct chest cannot r/o consolidation in RLL under mass. ct scan showing perinephritic stranding and mod hydronephrosis concerning for UTI/ nephritis UA+, also shows distended gallbladder w/ stones concerning for cholecystitis  - urine cx  -bcx  -sputum cx  -monitor wbc  -RUQ US to r/o cholecystitis  -vanc/ zosyn for broad coverage   -wound care for skin wound over abdomen  -vanc to cover possible cellulitis of abdomen wound vs pna    ENDOCRINE  #DM2  glucose 249, a1c 7.4, on home metformin  -ISS  -add regimen if needed    #hyperkalemia  k 5.7--> now resolved 4.5 s/p insulin/d50 and bicarb drip  -monitor bmp    MSK  #chronic back pain  s/p ketamine 50mg IVP for back pain  -sedated      #cystectomy with nephrostomy  ct showing mod hydro but draining urine, R kidney atrophy  -urology consulted, f.u recs  -monitor UO from nephrostomy bag    HEME  anemia, normocytic, liekly dilutional s/p IVF anf all cell lines decreased, cbc stable  -monitor CBC    OTHER  F: none  E: replete PRN but cautious in HUGO vs CKD requiring HD  N: npo  DVT ppx: HSQ 5000 q8  other ppx: ppi  FULL CODE  dispo: MICU  Ambulate as tolerated 68 y/o M PMH smoker (50 years and current), sleep apnea w/o cpap, short-term mem loss, HTN, DM2, chronic back pain s/p multiple back surgeries w/ hardware on methadone, anxiety, SBO s/p sx c/b wound that never healed, prostate cancer 1999 s/p radiation c/b radical cystectomy and nephrostomy w/ ileal conduit (in remission), p/w SOB, found to have severe acidemia, new onset suspected lung cancer w/ mets, admitted to MICU for emergent HD and further workup.    Neurology:   #agitation/ anxiety hx/ short term mem loss hx  -sedation and intubation until clinically improves  -c/w home sertraline when not sedated    #AMS  likely 2/2 uremia vs sepsis vs baseline per partner when has short term mem loss and gets confused and agitated  -intubated and sedated  -tx underlying cause as below    CARDIAC  #mild aortic aneurysm  dilation of the aortic root and ascending aorta on ct, no known hx  -control sBP <150   -consider echo    #hypotension  sepsis vs. cardiogenic vs 2/2 opiates  -c/w levophed for now, titrate as BP toelrates    #htn  hx of htn  -hold antihypertensives since hypotensive    PULMONARY  #lung cancer w/ mets  CT chest/ abd w/o contrast due to cr 4.5 showing 7cm RLL mass concerning for neoplasm w/ suspected hepatic and adrenal mets and abd LAD on ct  -obtain collateral about hx of cancer/ treatment from partner or pmd  -outpt f/u  -pal care consult  -venous dopplers b/l to r/o dvt    #smoker   50 years and current, no recent ct scans  -nicotine patch prn when pt not sedated    #sleep apnea  offered cpap but did not use  -cpap at night    #COPD  no dx, no inhalers, emphysema on CT scan of chest, suspect resp acidosis 2/2 COPD that is chronic and preventing compensation of met acidosis  -intubation to decrease WOB and increase RR if needed to compensate for me acidosis  -duonebs q6 standing   -bipap overnight when off intubation    #r/o PNA  CT chest cannot r/o PNA, cxr shows no obvious infiltrate but pt SOB w/ tachypnea and WBC 30, no hypoxia, no fevers, s/p vanc/ cefepime in ED--> vanc and zosyn  -sputum cx f/u  -daily cxr  -vanc zosyn as below  -f/u bcx- ngtd    RENAL  #uremia  bun ~70, unclear baseline, renal consulted--> improving 53  -f/u renal recs  -q12 bmp  -HD in near future  -monitor UO - goal 45cc/hr if not meeting goal than will tell renal need more urgent HD  -HD cath to be placed when needs HD    #HUGO vs CKD  Cr 4.5, unknown baseline, improving to ~3  -obtain collateral on cr baseline   -renally dose medications  -as above    Metabolic  #AG metabolic acidosis uncompnesated  likely 2/2 uremia, pH vbg 7.12-->7.08-->improved to 7.37 after bicarb drip  and IVF LR (turned off bc fluid overloaded) but then decreased again after bicarb gtt off  -monitor abg, has a-line  -renal consult as above  -intubated as above for resp compensation (high vt and rr)    ID  # sepsis 2/2 pyelonephritis  wbc 30--> 23, w/ tachypnea to 22 meets 2/4 sirs crtiria, afebrile, lactate 2.6. cxr clear, ct chest cannot r/o consolidation in RLL under mass. ct scan showing perinephritic stranding and mod hydronephrosis concerning for UTI/ nephritis UA+, also shows distended gallbladder w/ stones concerning for cholecystitis  - urine cx  -bcx  -sputum cx  -monitor wbc  -RUQ US to r/o cholecystitis  -vanc/ zosyn for broad coverage   -wound care for skin wound over abdomen (SBO s/p sx c/b wound that never healed, )  -vanc to cover possible cellulitis of abdomen wound vs pna    ENDOCRINE  #DM2  glucose 249, a1c 7.4, on home metformin  -ISS  -add regimen if needed    #hyperkalemia  k 5.7--> now resolved 4.5 s/p insulin/d50 and bicarb drip  -monitor bmp    MSK  #chronic back pain  chronic back pain s/p multiple back surgeries w/ hardware on methadone, utox  positive for methadone only which per partner pt gest from pain specialist Dr Kwan, takes  5mg 2-4 pills qd   -methadone 5-20 mg qd, higher end if neede for pain control when off sedation        #cystectomy with nephrostomy  ct showing mod hydro but draining urine, R kidney atrophy, prostate cancer 1999 s/p radiation c/b radical cystectomy and nephrostomy w/ ileal conduit (in remission)  -urology consulted, f.u recs  -monitor UO from nephrostomy bag    HEME  anemia, normocytic, liekly dilutional s/p IVF anf all cell lines decreased, cbc stable  -monitor CBC    OTHER  F: none  E: replete PRN but cautious in HUGO vs CKD requiring HD  N: npo  DVT ppx: HSQ 5000 q8  other ppx: ppi  FULL CODE  dispo: MICU  Ambulate as tolerated

## 2019-06-14 NOTE — PROCEDURE NOTE - NSICDXPROCEDURE_GEN_ALL_CORE_FT
PROCEDURES:  Insertion of nasogastric tube 14-Jun-2019 15:32:09  Robbie Tran  Ultrasound guidance for placement of central venous catheter (CVC) 14-Jun-2019 14:32:01  Robbie Tran
PROCEDURES:  Ultrasound guidance for placement of central venous catheter (CVC) 14-Jun-2019 14:32:01  Robbie Tran

## 2019-06-14 NOTE — DIETITIAN INITIAL EVALUATION ADULT. - ENERGY NEEDS
Height 64"; #; #; 162%IBW  BMI 36.2  Ideal body weight used for calculations as pt >120% of IBW. Needs estimated for maintenance in older adults; adjusted for vent. Fluids per team 2/2 overload and kidney dysfunction

## 2019-06-14 NOTE — CONSULT NOTE ADULT - PROBLEM SELECTOR RECOMMENDATION 9
ADVANCE CARE PLANNING MEETING  START TIME: 1434  END TIME: 1520  TOTAL TIME: 46 mins    A FACE TO FACE MEETING TO DISCUSS ADVANCE CARE PLANNING WAS HELD TODAY REGARDING: JANIE ZABALA with partner Vinod Bo, attended partially by Dr. Vincent  PRIMARY DECISION MAKER: Vinod Bo  ALTERNATE/SURROGATE: Vinod Bo  DISCUSSED ADVANCE DIRECTIVES INCLUDING, BUT NOT LIMITED TO, HEALTHCARE PROXY AND CODE STATUS.  DECISION REGARDING CODE STATUS: remain Full Code  DOCUMENTATION COMPLETED TODAY: none    -partner wishes to maintain pt. as a Full Code and current treatment plan until he is able to find pt's Living Will.  Vinod states pt. "has been indecisive regarding this matter in the past" when he was better able to participate in decision making and "we stopped talking about it when he memory started going".  Vinod is aware of the suspected metastatic cancer per CT

## 2019-06-14 NOTE — ADVANCED PRACTICE NURSE CONSULT - ASSESSMENT
68 y/o M PMH HTN, chronic back pain, s/p radical cystectomy p/w SOB, found to have severe acidemia, admitted to MICU for emergent HD and further workup. Large, healing surgical wound at mid abdomen with crusting blood over wound bed, minimal depth to wound. At inferior aspect of wound, small open site with purulent drainage noted and easily expressed. Urostomy pouch intact to right side of abdomen.

## 2019-06-14 NOTE — PROGRESS NOTE ADULT - ATTENDING COMMENTS
HUGO on CKD IV baseline Cr mid to high 2's in setting of severe sepsis, lactic acidosis, profound volume depletion with known prior cystectomy/nephrectomy and ileal conduit with R renal atrophy/hydro on CT scan, oliguric with metaboilc and respiratory acidosis on admission yesterday which greatly improved w volume resuscitation. Now intubated largely for agitation, pulm edema and effusion on CXR.Improving creatinine and UO. dw Dr Amin advised 100mg IV Lasix to b given. Will monitor UO, acidosis.

## 2019-06-14 NOTE — DIETITIAN INITIAL EVALUATION ADULT. - NS AS NUTRI INTERV ENTERAL NUTRITION
Route/Insert enteral feeding tube/If pt unable to take PO w/in 48-72hrs, consider placement of NGT and initiation of TF. Recommend Nepro @ 35mL/hr x 24hrs plus 1 ProStat. Goal rate may need to be adjusted pending use of propofol./Concentration/Rate/Schedule/Composition/Site care/Volume/Feeding tube flush

## 2019-06-14 NOTE — PROCEDURE NOTE - NSPROCDETAILS_GEN_ALL_CORE
lumen(s) aspirated and flushed/guidewire recovered/sterile dressing applied/sterile technique, catheter placed/ultrasound guidance
nasogastric/audible air bolus/placement confirmed by auscultation/gastric secretions aspirated, placement confirmed/bowel sounds present to 4 quadrants

## 2019-06-14 NOTE — CONSULT NOTE ADULT - SUBJECTIVE AND OBJECTIVE BOX
JANIE ZABALA          [id number]           : 1950    HPI:  70 y/o M PMH HTN, smoker, chronic back pain, s/p radical cystectomy w/ one remaining kidney w/ nephrostomy /w SOB x 3 days. Pt denies fever, cough, CP. Pt in pain in R back. No belly pain. Could not provide hx of when had procedures or smoking hx.    In the ED, pt afebrile, HR 72, satting 100% on 3L NC, RR 22-->18, BP 90/60-->120s. Labs significant for WBC 30, K 5.7, bicarb 11, AG 20, VBG pH 7.12-->7.08, CO2 40, lactate 2.6, BUN ~70, Cr ~4, unknown baseline. Carboxyhemoglobin slightly elevated at 2.6. CXR clear. CT chest abd concerning for metastatic lung cancer, and possible cholecystitis and nephritis w/ hydro. S/p vanc, cefepime, ketamine, bicarb drip 150/hr, insulin/ d50, LR 1 L bolus. Transferred to [facility] MICU for emergent HD and further care. On arrival, renal and urology consulted. Pt putting out urine into nephrostomy tube and abg improved so no urgent HD, but renal will monitor and start HD if needed. Pt started on vanc/ zosyn for pyelonephritis and broad coverage while r/o pna and cholecystitis, or infected surgical wound. (2019 15:29)    PAST MEDICAL & SURGICAL HISTORY:  Chronic back pain, back surgery  HTN (hypertension)  cystectomy  CASEY-uses cpap intermittently  prostate cancer treated with radiation in   bowel obstruction     FAMILY HISTORY: noncontributory    SOCIAL HISTORY: long time partner Vinod Bo since  who pt. 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  Pt. with progressive functional/mental decline over the years  HEENT: denies   NECK: denies   CVS: denies      RESP: couldn't tolerate bipap at home, was told he had sleep apnea for many years   GI: had bowel obstruction with surgery and nonhealing abd wound (dates unclear)  : urostomy  MUSC: ambulates with a walker at baseline    NEURO: progressive memory deficits over the last year, poor short term memory with preserved long term memory  PSYCH: denies   SKIN: abd wound issues for quite some time  LYMPH: denies      PHYSICAL EXAM:  T(C): 36.6 (19 @ 10:52), Max: 36.9 (19 @ 21:58)  HR: 72 (19 @ 15:00) (46 - 100)  BP: 122/54 (19 @ 03:00) (72/43 - 128/70)  RR: 12 (19 @ 15:00) (12 - 44)  SpO2: 97% (19 @ 15:00) (82% - 100%)  Wt(kg): --  Daily Height in cm: 162.56 (2019 17:00)    Daily Weight in k.9 (2019 12:44)  CAPILLARY BLOOD GLUCOSE    POCT Blood Glucose.: 129 mg/dL (2019 12:18)    I&O's Summary    2019 07:  -  2019 07:00  --------------------------------------------------------  IN: 4716 mL / OUT: 1500 mL / NET: 3216 mL    2019 07:01  -  2019 15:15  --------------------------------------------------------  IN: 358.4 mL / OUT: 740 mL / NET: -381.6 mL    GENERAL: Morbidly obese gentleman with partner at bedside  HEENT: normocephalic, anicteric, no spont OU opening, cannot assess hearing, dry mucous membranes      NECK: short, but supple   CVS: SR with PACs   RESP: ETT to CMV on vent, distant BS, bilateral rhonchi L>R   GI: NGT is clamped, midline abd dsg, obese abd    : pink urostomy    MUSC: no spont. mov't to extremities noted, long finger and toe nails    NEURO: sedated on propofol  PSYCH:  sedated on propofol  SKIN: no obvious rash noted anteriorly   LYMPH: no supraclavicular LAD   PREADMIT Karnofsky: 50%           CURRENT Karnofsky: 20-30%  CACHEXIA (Y/N): n  BMI: 36.2    Allergies    No Known Allergies    Intolerances    OPIATE NAIVE (Y/N): n  -iStop reviewed (Y/N): Y, Ref# [id number] (ritalin 10mg tabs, disp 19 #60, refills ordered since 2018), no controlled substances dispensed in [place], [place], [place], nor [place]                                MEDICATIONS:      MEDICATIONS  (STANDING):  ALBUTerol/ipratropium for Nebulization 3 milliLiter(s) Nebulizer every 6 hours  chlorhexidine 4% Liquid 1 Application(s) Topical <User Schedule>  dextrose 5%. 1000 milliLiter(s) (50 mL/Hr) IV Continuous <Continuous>  dextrose 50% Injectable 12.5 Gram(s) IV Push once  dextrose 50% Injectable 25 Gram(s) IV Push once  dextrose 50% Injectable 25 Gram(s) IV Push once  heparin  Injectable 7500 Unit(s) SubCutaneous every 8 hours  insulin lispro (HumaLOG) corrective regimen sliding scale   SubCutaneous Before meals and at bedtime  methadone    Tablet 5 milliGRAM(s) Oral daily  norepinephrine Infusion 0.05 MICROgram(s)/kG/Min (8.972 mL/Hr) IV Continuous <Continuous>  pantoprazole  Injectable 40 milliGRAM(s) IV Push daily  piperacillin/tazobactam IVPB. 2.25 Gram(s) IV Intermittent every 8 hours  propofol Infusion 5 MICROgram(s)/kG/Min (2.871 mL/Hr) IV Continuous <Continuous>  vancomycin  IVPB 1250 milliGRAM(s) IV Intermittent every 24 hours    MEDICATIONS  (PRN):  dextrose 40% Gel 15 Gram(s) Oral once PRN Blood Glucose LESS THAN 70 milliGRAM(s)/deciliter  glucagon  Injectable 1 milliGRAM(s) IntraMuscular once PRN Glucose LESS THAN 70 milligrams/deciliter  haloperidol    Injectable 2 milliGRAM(s) IV Push once PRN agitation  HYDROmorphone  Injectable 0.5 milliGRAM(s) IV Push every 4 hours PRN Severe Pain (7 - 10)    LABS:    CBC:                        11.3   23.38 )-----------( 367      ( 2019 05:57 )             35.1     CMP:        137  |  105  |  53<H>  ----------------------------<  154<H>  4.7   |  18<L>  |  3.20<H>    Ca    7.7<L>      2019 05:57  Phos  5.3       Mg     1.6         TPro  6.6  /  Alb  2.8<L>  /  TBili  0.4  /  DBili  x   /  AST  13  /  ALT  8<L>  /  AlkPhos  115  -14  PT/INR - ( 2019 18:00 )   PT: 16.8 sec;   INR: 1.47     PTT - ( 2019 18:00 )  PTT:29.1 sec    Urinalysis Basic - ( 2019 14:30 )    Color: Yellow / Appearance: Clear / S.015 / pH: x  Gluc: x / Ketone: NEGATIVE  / Bili: Negative / Urobili: 0.2 E.U./dL   Blood: x / Protein: 100 mg/dL / Nitrite: NEGATIVE   Leuk Esterase: Large / RBC: 5-10 /HPF / WBC Many /HPF   Sq Epi: x / Non Sq Epi: 0-5 /HPF / Bacteria: Present /HPF    IMAGING:  Reviewed  < from: CT Chest No Cont (19 @ 12:45) >  EXAM:  CT CHEST                        EXAM:  CT ABDOMEN AND PELVIS                        PROCEDURE DATE:  2019    < from: CT Chest No Cont (19 @ 12:45) >  IMPRESSION: Limited study without contrast.  7 cm masslike consolidation right lower lobe suspicious for neoplasm.   Superimposed pneumonia cannot exclude.  Mediastinal, right hilar and abdominal lymphadenopathy.  Hypodense hepatic lesions, suspicious for metastasis.  Right adrenal mass and left adrenal nodules, likely metastatic in nature.  Status post radical cystectomy and ileal conduit. Right renal atrophy   with moderate hydronephrosis and perinephric stranding. Superimposed   urinary tract infection is not excluded.  Parastomal nonobstructive herniation of ileocecal bowel in the right   lower quadrant.  Distended gallbladder with layering gallstones, favor gallbladder   hydrops. Consider radionuclide biliary scan.  Mild aneurysmal dilatation of the aortic root and ascending aorta.  < end of copied text >    ADVANCE DIRECTIVES: Full Code     Decision maker: pt is currently sedated on vent without any capacity to make med decisions at this time  Legal surrogate: long time partner Vinod Bo [phone]    PSYCHOSOCIAL-SPIRITUAL ASSESSMENT: pending    GOALS OF CARE DISCUSSION:  Palliative care info/counseling provided	      Family meeting  Advanced Directives addressed please see Advance Care Planning Note	      See previous Palliative Medicine Note  Documentation of GOC: cont. current treatment while partner finds pt's Living Will    AGENCY CHOICE DISCUSSED:   none	          REFERRALS:	   Unit SW/Case Mgmt  Nutrition

## 2019-06-14 NOTE — CONSULT NOTE ADULT - ASSESSMENT
68 y/o morbidly obese M with h/o remote prostate ca s/p RT, bowel obstruction, right cystectomy with ileal conduit, HTN, spine surgery, chronic pain syndrome on methadone, CASEY, and progressive cognitive deficits per partner, p/w SOB, found to be in septic shock from pyleonephritis vs. cholecystitis vs infected abd wound vs. pna and abnormal radiograph suggesting metastatic disease, seen for goals of care

## 2019-06-14 NOTE — PROCEDURE NOTE - NSPOSTCAREGUIDE_GEN_A_CORE
Keep the cast/splint/dressing clean and dry/Instructed patient/caregiver to follow-up with primary care physician/Instructed patient/caregiver regarding signs and symptoms of infection/Verbal/written post procedure instructions were given to patient/caregiver
Verbal/written post procedure instructions were given to patient/caregiver/Instructed patient/caregiver to follow-up with primary care physician/Instructed patient/caregiver regarding signs and symptoms of infection/Care for catheter as per unit/ICU protocols

## 2019-06-14 NOTE — PROGRESS NOTE ADULT - SUBJECTIVE AND OBJECTIVE BOX
Patient is a 69y Male seen and evaluated at bedside. Patient lying in bed in respiratory distress on BIPAP at present. Chest xray with pleural effusion and congestion. Received IV fluid overnight. Last 24 hours I/o with 3.2L net positive fluid balance. Urology team following for ileal conduit.    ALBUTerol/ipratropium for Nebulization 3 every 6 hours  chlorhexidine 4% Liquid 1 <User Schedule>  dextrose 40% Gel 15 once PRN  dextrose 5%. 1000 <Continuous>  dextrose 50% Injectable 12.5 once  dextrose 50% Injectable 25 once  dextrose 50% Injectable 25 once  furosemide   Injectable 80 once  glucagon  Injectable 1 once PRN  haloperidol    Injectable 2 once PRN  haloperidol    Injectable 2 once  heparin  Injectable 7500 every 8 hours  HYDROmorphone  Injectable 0.5 every 4 hours PRN  insulin lispro (HumaLOG) corrective regimen sliding scale  Before meals and at bedtime  methadone    Tablet 5 daily  norepinephrine Infusion 0.05 <Continuous>  piperacillin/tazobactam IVPB. 2.25 every 8 hours  vancomycin  IVPB 1250 every 24 hours      Allergies    No Known Allergies    Intolerances        T(C): , Max: 36.9 (19 @ 13:34)  T(F): , Max: 98.5 (19 @ 21:58)  HR: 82 (19 @ 11:32)  BP: 122/54 (19 @ 03:00)  BP(mean): 78 (19 @ 03:00)  RR: 18 (19 @ 10:00)  SpO2: 95% (19 @ 11:32)  Wt(kg): --     @ 07:  -   @ 07:00  --------------------------------------------------------  IN: 4716 mL / OUT: 1500 mL / NET: 3216 mL     @ 07:01  -   @ 11:35  --------------------------------------------------------  IN: 75 mL / OUT: 280 mL / NET: -205 mL      Height (cm): 162.56 ( @ 17:00)  Weight (kg): 95.7 ( @ 17:00)  BMI (kg/m2): 36.2 ( @ 17:00)  BSA (m2): 2 ( @ 17:00)    Review of Systems:  Limited. Patient poor historian now on BIPAP      PHYSICAL EXAM:  GENERAL: Ill appearing, lying in bed, alert and awake  HEAD:  Atraumatic, Normocephalic,   EYES: Bilateral conjunctival and scleral pallor   Oral cavity: Oral mucosa dry and pale  NECK: Neck supple, No JVD  CHEST/LUNG: Bilateral decreased breath sounds, Bibasilar rales and crepitations, no wheezing  HEART: Regular rate and rhythm. KJ II/VI at LPSB, No gallop, no rub   ABDOMEN: Soft, Nontender, non distended, bowel sounds present, surgical scar with escar, Ileal conduit noted, No flank tenderness.   EXTREMITIES: Bilateral leg trace edema, no cyanosis, no clubbing  Neurology: AAOx2-3 no focal neurological deficit  SKIN: No rashes or lesions    LABS:                        11.3   23.38 )-----------( 367      ( 2019 05:57 )             35.1         137  |  105  |  53<H>  ----------------------------<  154<H>  4.7   |  18<L>  |  3.20<H>    Ca    7.7<L>      2019 05:57  Phos  5.3       Mg     1.6         TPro  6.6  /  Alb  2.8<L>  /  TBili  0.4  /  DBili  x   /  AST  13  /  ALT  8<L>  /  AlkPhos  115      Hemoglobin A1C, Whole Blood: 7.4 % <H> [4.0 - 5.6] ( @ 05:57)    PT/INR - ( 2019 18:00 )   PT: 16.8 sec;   INR: 1.47          PTT - ( 2019 18:00 )  PTT:29.1 sec  Urinalysis Basic - ( 2019 16:17 )    Color: Yellow / Appearance: Clear / S.020 / pH: x  Gluc: x / Ketone: NEGATIVE  / Bili: NEGATIVE / Urobili: 0.2 E.U./dL   Blood: x / Protein: >=300 mg/dL / Nitrite: NEGATIVE   Leuk Esterase: Moderate / RBC: > 10 /HPF / WBC Many /HPF   Sq Epi: x / Non Sq Epi: 0-5 /HPF / Bacteria: Present /HPF            RADIOLOGY & ADDITIONAL STUDIES:    < from: Xray Chest 1 View-PORTABLE IMMEDIATE (19 @ 01:58) >    EXAM:  XR CHEST PORTABLE IMMED 1V                          PROCEDURE DATE:  2019          INTERPRETATION:    XR CHEST IMMEDIATE dated 2019 1:58 AM    HISTORY: Cough    COMPARISON: Chest radiograph from 2019.    FINDINGS:   The patient is rotated to the right. Within that limitation, the   cardiomediastinal silhouette is stable. There is slightly worsening   pulmonary vascular congestion. There is new/worsened loculated right   pleural effusion. Lumbar spine hardware.      IMPRESSION:   Worsening pulmonary vascular congestion and right pleural effusion.                Thank you for the opportunity to participate in the care of this patient.    NATASHA VALADEZ M.D., RADIOLOGY RESIDENT  This document has been electronically signed.  JR BRANDON   This document has been electronically signed. 2019 10:33AM                  < end of copied text >

## 2019-06-14 NOTE — PROGRESS NOTE ADULT - ATTENDING COMMENTS
Patient seen and examined with house-staff during bedside rounds.  Resident note read, including vitals, physical findings, laboratory data, and radiological reports.   Revisions included below.  Direct personal management at bed side and extensive interpretation of the data.  Plan was outlined and discussed in details with the housestaff.  Decision making of high complexity  Action taken for acute disease activity to reflect the level of care provided:  - medication reconciliation  - review laboratory data  - pressor requirement is decreasing  - concern is the secondary and secondary respiratory acidosis  - NIV  - continue on antibiotic  - follow on vanco serum level  - follow on cultures  - Palliative care medicine consult  - Hb is stable  - ECHO  - venous doppler  - monitor MS  - start bronchodilators

## 2019-06-15 NOTE — PROGRESS NOTE ADULT - ASSESSMENT
70 y/o M PMH HTN, chronic back pain, s/p radical cystectomy p/w SOB, found to have severe metabolic and respiratory acidemia, urosepsis with likely left pyelonephritis, with likely acute ATN on CKD, severe dehydration, likely metastatic cancer, admitted to MICU for emergent HD and further workup.    # Non oliguric HUGO   - prerenal HUGO in setting of hypotension and with cr improving with IVF  vs ATN in setting of hypotension and sepsis  - Patient may have had ckd in the past secondary to chronic obstruction as  CT scan showed r renal atrophy with moderate hydronephrosis.  - unknown prior baseline, but improving with management of urosepsis  - Cr 4.4 on admission > 2.8 today  - UOP 4.1 L in past 24 hrs after stat dose of IV lasix  - Obtain repeat urinalysis, Urine PCR 1.6  - Urology team following for ileal conduit and urostomy  - monitor urine output via urostomy bag  - Adjust antibiotics as per renal dose clearance  - Monitor BMP  - Volume status acceptable for now  - Continue diuretics as needed    # Respiratory acidosis with anion gap metabolic acidosis with metabolic alkalosis  - now intubated on vent  - suggest bicarb 1300mg po tid

## 2019-06-15 NOTE — PROGRESS NOTE ADULT - ATTENDING COMMENTS
HUGO appears to be improving with falling creat and excellent uo -- possible related to improved hemodynamics   pulm status improved- still effusions on CXR   still on pressors  would aim to keep I/O about even - can use PRN lasix if needs

## 2019-06-15 NOTE — PROGRESS NOTE ADULT - SUBJECTIVE AND OBJECTIVE BOX
OVERNIGHT EVENTS: tachycardic and went into atrial fibrillation. given 500cc 5% albumin without resolution; febrile to 100.5; metabolic alkalosis improved    SUBJECTIVE / INTERVAL HPI: Patient seen and examined at bedside.     VITAL SIGNS:  Vital Signs Last 24 Hrs  T(C): 37.7 (15 Ramírez 2019 14:19), Max: 38.1 (15 Ramírez 2019 01:02)  T(F): 99.8 (15 Ramírez 2019 14:19), Max: 100.5 (15 Ramírez 2019 01:02)  HR: 112 (15 Ramírez 2019 19:00) (66 - 132)  BP: 98/79 (15 Ramírez 2019 19:00) (98/70 - 98/79)  BP(mean): 87 (15 Ramírez 2019 19:00) (85 - 87)  RR: 18 (15 Ramírez 2019 19:00) (17 - 29)  SpO2: 98% (15 Ramírez 2019 19:00) (96% - 99%)    PHYSICAL EXAM:    General: WDWN  HEENT: NC/AT; PERRL, anicteric sclera; MMM  Neck: supple  Cardiovascular: +S1/S2, RRR  Respiratory: CTA B/L; no W/R/R  Gastrointestinal: soft, NT/ND; +BSx4  Extremities: WWP; no edema, clubbing or cyanosis  Vascular: 2+ radial, DP/PT pulses B/L  Neurological: AAOx3; no focal deficits    MEDICATIONS:  MEDICATIONS  (STANDING):  ALBUTerol/ipratropium for Nebulization 3 milliLiter(s) Nebulizer every 6 hours  amiodarone    Tablet 200 milliGRAM(s) Oral daily  chlorhexidine 4% Liquid 1 Application(s) Topical <User Schedule>  dextrose 5%. 1000 milliLiter(s) (50 mL/Hr) IV Continuous <Continuous>  dextrose 50% Injectable 12.5 Gram(s) IV Push once  dextrose 50% Injectable 25 Gram(s) IV Push once  dextrose 50% Injectable 25 Gram(s) IV Push once  heparin  Injectable 7500 Unit(s) SubCutaneous every 8 hours  insulin lispro (HumaLOG) corrective regimen sliding scale   SubCutaneous Before meals and at bedtime  meropenem  IVPB 1000 milliGRAM(s) IV Intermittent every 12 hours  methadone    Tablet 5 milliGRAM(s) Oral daily  norepinephrine Infusion 0.05 MICROgram(s)/kG/Min (8.972 mL/Hr) IV Continuous <Continuous>  pantoprazole  Injectable 40 milliGRAM(s) IV Push daily  propofol Infusion 5 MICROgram(s)/kG/Min (2.871 mL/Hr) IV Continuous <Continuous>  sodium bicarbonate 650 milliGRAM(s) Oral daily  vancomycin  IVPB 1250 milliGRAM(s) IV Intermittent every 24 hours    MEDICATIONS  (PRN):  dextrose 40% Gel 15 Gram(s) Oral once PRN Blood Glucose LESS THAN 70 milliGRAM(s)/deciliter  glucagon  Injectable 1 milliGRAM(s) IntraMuscular once PRN Glucose LESS THAN 70 milligrams/deciliter  HYDROmorphone  Injectable 0.5 milliGRAM(s) IV Push every 4 hours PRN Severe Pain (7 - 10)      ALLERGIES:  Allergies    No Known Allergies    Intolerances        LABS:                        11.6   27.46 )-----------( 405      ( 15 Ramírez 2019 04:52 )             36.5     06-15    142  |  104  |  42<H>  ----------------------------<  161<H>  3.8   |  15<L>  |  2.85<H>    Ca    8.4      15 Ramírez 2019 11:43  Phos  5.2     06-15  Mg     2.1     06-14    TPro  7.2  /  Alb  3.0<L>  /  TBili  0.4  /  DBili  x   /  AST  13  /  ALT  8<L>  /  AlkPhos  138<H>  06-15      Urinalysis Basic - ( 2019 14:30 )    Color: Yellow / Appearance: Clear / S.015 / pH: x  Gluc: x / Ketone: NEGATIVE  / Bili: Negative / Urobili: 0.2 E.U./dL   Blood: x / Protein: 100 mg/dL / Nitrite: NEGATIVE   Leuk Esterase: Large / RBC: 5-10 /HPF / WBC Many /HPF   Sq Epi: x / Non Sq Epi: 0-5 /HPF / Bacteria: Present /HPF      CAPILLARY BLOOD GLUCOSE      POCT Blood Glucose.: 155 mg/dL (15 Ramírez 2019 18:35)      RADIOLOGY & ADDITIONAL TESTS: Reviewed. OVERNIGHT EVENTS: tachycardic and went into atrial fibrillation. given 500cc 5% albumin without resolution; febrile to 100.5; metabolic alkalosis improved    SUBJECTIVE / INTERVAL HPI: Patient seen and examined at bedside. intubated and sedated    VITAL SIGNS:  Vital Signs Last 24 Hrs  T(C): 37.7 (15 Ramírez 2019 14:19), Max: 38.1 (15 Ramírez 2019 01:02)  T(F): 99.8 (15 Ramírez 2019 14:19), Max: 100.5 (15 Ramírez 2019 01:02)  HR: 112 (15 Ramírez 2019 19:00) (66 - 132)  BP: 98/79 (15 Ramírez 2019 19:00) (98/70 - 98/79)  BP(mean): 87 (15 Ramírez 2019 19:00) (85 - 87)  RR: 18 (15 Ramírez 2019 19:00) (17 - 29)  SpO2: 98% (15 Ramírez 2019 19:00) (96% - 99%)    PHYSICAL EXAM:    General: obese elderly male in NAD - intubated and sedated  HEENT: NC/AT; PERRL, anicteric sclera; MMM  Neck: supple  Cardiovascular: +S1/S2, tachycardic  Respiratory: b/l rhonci appreciated  Gastrointestinal: soft, NT/ND; +BSx4; hernia/internal ostomy noted; nephrostomy bag in place; large chronic-appearing wound without surrounding erythema or fluctuance.  Extremities: WWP; no edema, clubbing or cyanosis  Vascular: 2+ radial, DP/PT pulses B/L  Neurological: AAOx0    MEDICATIONS:  MEDICATIONS  (STANDING):  ALBUTerol/ipratropium for Nebulization 3 milliLiter(s) Nebulizer every 6 hours  amiodarone    Tablet 200 milliGRAM(s) Oral daily  chlorhexidine 4% Liquid 1 Application(s) Topical <User Schedule>  dextrose 5%. 1000 milliLiter(s) (50 mL/Hr) IV Continuous <Continuous>  dextrose 50% Injectable 12.5 Gram(s) IV Push once  dextrose 50% Injectable 25 Gram(s) IV Push once  dextrose 50% Injectable 25 Gram(s) IV Push once  heparin  Injectable 7500 Unit(s) SubCutaneous every 8 hours  insulin lispro (HumaLOG) corrective regimen sliding scale   SubCutaneous Before meals and at bedtime  meropenem  IVPB 1000 milliGRAM(s) IV Intermittent every 12 hours  methadone    Tablet 5 milliGRAM(s) Oral daily  norepinephrine Infusion 0.05 MICROgram(s)/kG/Min (8.972 mL/Hr) IV Continuous <Continuous>  pantoprazole  Injectable 40 milliGRAM(s) IV Push daily  propofol Infusion 5 MICROgram(s)/kG/Min (2.871 mL/Hr) IV Continuous <Continuous>  sodium bicarbonate 650 milliGRAM(s) Oral daily  vancomycin  IVPB 1250 milliGRAM(s) IV Intermittent every 24 hours    MEDICATIONS  (PRN):  dextrose 40% Gel 15 Gram(s) Oral once PRN Blood Glucose LESS THAN 70 milliGRAM(s)/deciliter  glucagon  Injectable 1 milliGRAM(s) IntraMuscular once PRN Glucose LESS THAN 70 milligrams/deciliter  HYDROmorphone  Injectable 0.5 milliGRAM(s) IV Push every 4 hours PRN Severe Pain (7 - 10)      ALLERGIES:  Allergies    No Known Allergies    Intolerances        LABS:                        11.6   27.46 )-----------( 405      ( 15 Ramírez 2019 04:52 )             36.5     -15    142  |  104  |  42<H>  ----------------------------<  161<H>  3.8   |  15<L>  |  2.85<H>    Ca    8.4      15 Ramírez 2019 11:43  Phos  5.2     -15  Mg     2.1         TPro  7.2  /  Alb  3.0<L>  /  TBili  0.4  /  DBili  x   /  AST  13  /  ALT  8<L>  /  AlkPhos  138<H>  -15      Urinalysis Basic - ( 2019 14:30 )    Color: Yellow / Appearance: Clear / S.015 / pH: x  Gluc: x / Ketone: NEGATIVE  / Bili: Negative / Urobili: 0.2 E.U./dL   Blood: x / Protein: 100 mg/dL / Nitrite: NEGATIVE   Leuk Esterase: Large / RBC: 5-10 /HPF / WBC Many /HPF   Sq Epi: x / Non Sq Epi: 0-5 /HPF / Bacteria: Present /HPF      CAPILLARY BLOOD GLUCOSE      POCT Blood Glucose.: 155 mg/dL (15 Ramírez 2019 18:35)      RADIOLOGY & ADDITIONAL TESTS: Reviewed.    RUQ US:   Hepatomegaly.     Two solid liver lesions of indeterminate etiology. When clinically   appropriate, MRI with hepatic mass protocol is recommended.    Markedly distended gallbladder with sludge and gallstones.     Moderate right hydronephrosis.

## 2019-06-15 NOTE — PROGRESS NOTE ADULT - ASSESSMENT
70 y/o M PMH smoker (50 years and current), sleep apnea w/o cpap, short-term mem loss, HTN, DM2, chronic back pain s/p multiple back surgeries w/ hardware on methadone, anxiety, SBO s/p sx c/b wound that never healed, prostate cancer 1999 s/p radiation c/b radical cystectomy and nephrostomy w/ ileal conduit (in remission), p/w SOB, found to have severe acidemia, new onset suspected lung cancer w/ mets, admitted to MICU for emergent HD and further workup.    Neurology:   #agitation/ anxiety hx/ short term mem loss hx  -sedation and intubation until clinically improves  -c/w home sertraline when not sedated    #AMS  likely 2/2 uremia vs sepsis vs baseline per partner when has short term mem loss and gets confused and agitated  -intubated and sedated  -tx underlying cause as below    CARDIAC  #mild aortic aneurysm  dilation of the aortic root and ascending aorta on ct, no known hx  -control sBP <150   -consider echo    #hypotension  sepsis vs. cardiogenic vs 2/2 opiates  -c/w levophed for now, titrate as BP tolerates    #htn  hx of htn  -currently on pressors - holding home medications    PULMONARY  #lung cancer w/ mets  CT chest/ abd w/o contrast due to cr 4.5 showing 7cm RLL mass concerning for neoplasm w/ suspected hepatic and adrenal mets and abd LAD on ct  -obtain collateral about hx of cancer/ treatment from partner or pmd  -outpt f/u  -pal care consult  -venous dopplers negative  -consider bronch monday for potential BAL for cytology    #smoker   50 years and current, no recent ct scans  -nicotine patch prn when pt not sedated    #sleep apnea  offered cpap but did not use  -currently intubated    #COPD  no dx, no inhalers, emphysema on CT scan of chest, suspect resp acidosis 2/2 COPD that is chronic and preventing compensation of met acidosis  -intubation to decrease WOB and increase RR if needed to compensate for metabolic acidosis  -duonebs q6 standing       #r/o PNA  CT chest cannot r/o PNA, cxr shows no obvious infiltrate but pt SOB w/ tachypnea and WBC 30, no hypoxia, no fevers, s/p vanc/ cefepime in ED--> vanc and zosyn  -sputum cx f/u  -daily cxr  -vanc zosyn as below  -f/u bcx- ngtd    RENAL  #uremia  bun ~70, unclear baseline, renal consulted--> improving 53  -f/u renal recs  -q12 bmp  -HD in near future?  -monitor UO - goal 45cc/hr if not meeting goal than will tell renal need more urgent HD    #HUGO vs CKD  Cr 4.5, unknown baseline, improving to ~3  -obtain collateral on cr baseline   -renally dose medications  -as above    Metabolic  #AG metabolic acidosis uncompnesated  likely 2/2 uremia, pH vbg 7.12-->7.08-->improved to 7.37 after bicarb drip  and IVF LR (turned off bc fluid overloaded) but then decreased again after bicarb gtt off  -monitor abg,   -renal consult as above  -intubated as above for resp compensation (high vt and rr)    ID  # sepsis 2/2 pyelonephritis  wbc 30--> 23, w/ tachypnea to 22 meets 2/4 sirs crtiria, afebrile, lactate 2.6. cxr clear, ct chest cannot r/o consolidation in RLL under mass. ct scan showing perinephritic stranding and mod hydronephrosis concerning for UTI/ nephritis UA+, also shows distended gallbladder w/ stones concerning for cholecystitis  - urine cx  -bcx  -sputum cx  -monitor wbc  -RUQ US w hepatomegaly, 2 solid liver lesions, markedly distended gallbladder w sludge and gallstones; moderate R hydronephrosis  -vanc/ zosyn for broad coverage   -wound care for skin wound over abdomen (SBO s/p sx c/b wound that never healed, )  -vanc to cover possible cellulitis of abdomen wound vs pna    ENDOCRINE  #DM2  glucose 249, a1c 7.4, on home metformin  -ISS  -add regimen if needed    #hyperkalemia  k 5.7--> now resolved 4.5 s/p insulin/d50 and bicarb drip  -monitor bmp    MSK  #chronic back pain  chronic back pain s/p multiple back surgeries w/ hardware on methadone, utox  positive for methadone only which per partner pt gest from pain specialist Dr Kwan, takes  5mg 2-4 pills qd   -methadone 5-20 mg qd, higher end if neede for pain control when off sedation        #cystectomy with nephrostomy  ct showing mod hydro but draining urine, R kidney atrophy, prostate cancer 1999 s/p radiation c/b radical cystectomy and nephrostomy w/ ileal conduit (in remission)  -urology consulted, f.u recs  -monitor UO from nephrostomy bag    HEME  anemia, normocytic, liekly dilutional s/p IVF anf all cell lines decreased, cbc stable  -monitor CBC    OTHER  F: none  E: replete PRN but cautious in HUGO vs CKD requiring HD  N: npo  DVT ppx: HSQ 5000 q8  other ppx: ppi  FULL CODE  dispo: MICU  Ambulate as tolerated 68 y/o M PMH smoker (50 years and current), sleep apnea w/o cpap, short-term mem loss, HTN, DM2, chronic back pain s/p multiple back surgeries w/ hardware on methadone, anxiety, SBO s/p sx c/b wound that never healed, prostate cancer 1999 s/p radiation c/b radical cystectomy and nephrostomy w/ ileal conduit (in remission), p/w SOB, found to have severe acidemia, new onset suspected lung cancer w/ mets, admitted to MICU for emergent HD and further workup.    Neurology  #agitation/ anxiety hx/ short term mem loss hx  -sedation and intubation until clinically improves  -c/w home sertraline when not sedated    #AMS  likely 2/2 uremia vs sepsis vs baseline per partner when has short term mem loss and gets confused and agitated  -intubated and sedated  -tx underlying cause as below    Cardiac  #mild aortic aneurysm  dilation of the aortic root and ascending aorta on ct, no known hx  -control sBP <150   -consider echo    #hypotension  sepsis vs. cardiogenic vs 2/2 opiates  -c/w levophed for now, titrate as BP tolerates    #htn  hx of htn  -currently on pressors - holding home medications    Pulmonary  #lung cancer w/ mets  CT chest/ abd w/o contrast due to cr 4.5 showing 7cm RLL mass concerning for neoplasm w/ suspected hepatic and adrenal mets and abd LAD on ct  -obtain collateral about hx of cancer/ treatment from partner or pmd  -outpt f/u  -pal care consult  -venous dopplers negative  -consider bronch monday for potential BAL for cytology    #smoker   50 years and current, no recent ct scans  -nicotine patch prn when pt not sedated    #sleep apnea  offered cpap but did not use  -currently intubated    #COPD  no dx, no inhalers, emphysema on CT scan of chest, suspect resp acidosis 2/2 COPD that is chronic and preventing compensation of met acidosis  -intubation to decrease WOB and increase RR if needed to compensate for metabolic acidosis  -duonebs q6 standing     #r/o PNA  CT chest cannot r/o PNA, cxr shows no obvious infiltrate but pt SOB w/ tachypnea and WBC 30, no hypoxia, no fevers, s/p vanc/ cefepime in ED--> vanc and zosyn  -sputum cx f/u  -daily cxr  -vanc zosyn as below  -f/u bcx- ngtd    Renal  #uremia  bun ~70, unclear baseline, renal consulted--> improving 53  -f/u renal recs  -q12 bmp  -HD in near future?  -monitor UO - goal 45cc/hr if not meeting goal than will tell renal need more urgent HD    #HUGO vs CKD  Cr 4.5, unknown baseline, improving to ~3  -obtain collateral on cr baseline   -renally dose medications  -as above    Metabolic  #AG metabolic acidosis uncompnesated  likely 2/2 uremia, pH vbg 7.12-->7.08-->improved to 7.37 after bicarb drip  and IVF LR (turned off bc fluid overloaded) but then decreased again after bicarb gtt off  -monitor abg,   -renal consult as above  -intubated as above for resp compensation (high vt and rr)    ID  # sepsis 2/2 pyelonephritis vs cholecystitis  wbc 30--> 23, w/ tachypnea to 22 meets 2/4 sirs crtiria, afebrile, lactate 2.6. cxr clear, ct chest cannot r/o consolidation in RLL under mass. ct scan showing perinephritic stranding and mod hydronephrosis concerning for UTI/ nephritis UA+, also shows distended gallbladder w/ stones concerning for cholecystitis  - urine cx  -bcx  -sputum cx  -monitor wbc  -RUQ US w hepatomegaly, 2 solid liver lesions, markedly distended gallbladder w sludge and gallstones; moderate R hydronephrosis  -vanc/ zosyn for broad coverage   -wound care for skin wound over abdomen (SBO s/p sx c/b wound that never healed, )  -vanc to cover possible cellulitis of abdomen wound vs pna  - f/u HIDA scan    Endocrine  #DM2  glucose 249, a1c 7.4, on home metformin  -ISS  -add regimen if needed    #hyperkalemia  k 5.7--> now resolved 4.5 s/p insulin/d50 and bicarb drip  -monitor bmp    MSK  #chronic back pain  chronic back pain s/p multiple back surgeries w/ hardware on methadone, utox  positive for methadone only which per partner pt gest from pain specialist Dr Kwan, takes  5mg 2-4 pills qd   -methadone 5-20 mg qd, higher end if neede for pain control when off sedation        #cystectomy with nephrostomy  ct showing mod hydro but draining urine, R kidney atrophy, prostate cancer 1999 s/p radiation c/b radical cystectomy and nephrostomy w/ ileal conduit (in remission)  -urology consulted, f.u recs  -monitor UO from nephrostomy bag    Heme  anemia, normocytic, liekly dilutional s/p IVF anf all cell lines decreased, cbc stable  -monitor CBC    OTHER  F: none  E: replete PRN but cautious in HUGO vs CKD requiring HD  N: npo  DVT ppx: HSQ 5000 q8  other ppx: ppi  FULL CODE  dispo: MICU  Ambulate as tolerated 70 y/o M PMH smoker (50 years and current), sleep apnea w/o cpap, short-term mem loss, HTN, DM2, chronic back pain s/p multiple back surgeries w/ hardware on methadone, anxiety, SBO s/p sx c/b wound that never healed, prostate cancer 1999 s/p radiation c/b radical cystectomy and nephrostomy w/ ileal conduit (in remission), p/w SOB, found to have severe acidemia, new onset suspected lung cancer w/ mets, admitted to MICU for emergent HD and further workup.    Neurology  #agitation/ anxiety hx/ short term mem loss hx  -sedation and intubation until clinically improves  -c/w home sertraline when not sedated    #AMS  likely 2/2 uremia vs sepsis vs baseline per partner when has short term mem loss and gets confused and agitated  -intubated and sedated  -tx underlying cause as below    Cardiac  #mild aortic aneurysm  dilation of the aortic root and ascending aorta on ct, no known hx  -control sBP <150   -consider echo    #hypotension  sepsis vs. cardiogenic vs 2/2 opiates  -c/w levophed for now, titrate as BP tolerates    #htn  hx of htn  -currently on pressors - holding home medications    Pulmonary  #lung cancer w/ mets  CT chest/ abd w/o contrast due to cr 4.5 showing 7cm RLL mass concerning for neoplasm w/ suspected hepatic and adrenal mets and abd LAD on ct  -obtain collateral about hx of cancer/ treatment from partner or pmd  -outpt f/u  -pal care consult  -venous dopplers negative  -consider bronch monday for potential BAL for cytology    #smoker   50 years and current, no recent ct scans  -nicotine patch prn when pt not sedated    #sleep apnea  offered cpap but did not use  -currently intubated    #COPD  no dx, no inhalers, emphysema on CT scan of chest, suspect resp acidosis 2/2 COPD that is chronic and preventing compensation of met acidosis  -intubation to decrease WOB and increase RR if needed to compensate for metabolic acidosis  -duonebs q6 standing     #r/o PNA  CT chest cannot r/o PNA, cxr shows no obvious infiltrate but pt SOB w/ tachypnea and WBC 30, no hypoxia, no fevers, s/p vanc/ cefepime in ED--> vanc and zosyn  -sputum cx f/u  -daily cxr  -vanc zosyn and tito as below  -f/u bcx- ngtd    Renal  #uremia  bun ~70, unclear baseline, renal consulted--> improving 53  -f/u renal recs  -q12 bmp  -HD in near future?  -monitor UO - goal 45cc/hr if not meeting goal than will tell renal need more urgent HD    #HUGO vs CKD  Cr 4.5, unknown baseline, improving to ~3  -obtain collateral on cr baseline   -renally dose medications  -as above    Metabolic  #AG metabolic acidosis uncompnesated  likely 2/2 uremia, pH vbg 7.12-->7.08-->improved to 7.37 after bicarb drip  and IVF LR (turned off bc fluid overloaded) but then decreased again after bicarb gtt off  -monitor abg,   -renal consult as above  -intubated as above for resp compensation (high vt and rr)    ID  # sepsis 2/2 pyelonephritis vs cholecystitis  wbc 30--> 23, w/ tachypnea to 22 meets 2/4 sirs crtiria, afebrile, lactate 2.6. cxr clear, ct chest cannot r/o consolidation in RLL under mass. ct scan showing perinephritic stranding and mod hydronephrosis concerning for UTI/ nephritis UA+, also shows distended gallbladder w/ stones concerning for cholecystitis  - urine cx  -bcx  -sputum cx  -monitor wbc  -RUQ US w hepatomegaly, 2 solid liver lesions, markedly distended gallbladder w sludge and gallstones; moderate R hydronephrosis  -vanc/ zosyn for broad coverage   - initiated meropenem as pt was spiking through zosyn (6/15- )  -wound care for skin wound over abdomen (SBO s/p sx c/b wound that never healed, )  -vanc to cover possible cellulitis of abdomen wound vs pna  - f/u HIDA scan    Endocrine  #DM2  glucose 249, a1c 7.4, on home metformin  -ISS  -add regimen if needed    #hyperkalemia  k 5.7--> now resolved 4.5 s/p insulin/d50 and bicarb drip  -monitor bmp    MSK  #chronic back pain  chronic back pain s/p multiple back surgeries w/ hardware on methadone, utox  positive for methadone only which per partner pt gest from pain specialist Dr Kwan, takes  5mg 2-4 pills qd   -methadone 5-20 mg qd, higher end if neede for pain control when off sedation        #cystectomy with nephrostomy  ct showing mod hydro but draining urine, R kidney atrophy, prostate cancer 1999 s/p radiation c/b radical cystectomy and nephrostomy w/ ileal conduit (in remission)  -urology consulted, f.u recs  -monitor UO from nephrostomy bag    Heme  anemia, normocytic, liekly dilutional s/p IVF anf all cell lines decreased, cbc stable  -monitor CBC    OTHER  F: none  E: replete PRN but cautious in HUGO vs CKD requiring HD  N: npo  DVT ppx: HSQ 5000 q8  other ppx: ppi  FULL CODE  dispo: MICU  Ambulate as tolerated

## 2019-06-15 NOTE — PROGRESS NOTE ADULT - ATTENDING COMMENTS
Patient seen and examined with house-staff during bedside rounds.  Resident note read, including vitals, physical findings, laboratory data, and radiological reports.   Revisions included below.  Direct personal management at bed side and extensive interpretation of the data.  Plan was outlined and discussed in details with the housestaff.  Decision making of high complexity  Action taken for acute disease activity to reflect the level of care provided:  - medication reconciliation  - review laboratory data  - gas exchange improved   - Cr decreased   - still febrile, wbc increased, and pressor requirement increased cultures negative to date, CXR unchanged   - change to merepenum, and proceed with HIDA scan  - fluid balance negative 2 liters   - follow with cvp  - start diet after HIDA scan  -followed by palliative and will follow with the living will  - as per partner the findngs if the metastatic disease is new Patient seen and examined with house-staff during bedside rounds.  Resident note read, including vitals, physical findings, laboratory data, and radiological reports.   Revisions included below.  Direct personal management at bed side and extensive interpretation of the data.  Plan was outlined and discussed in details with the housestaff.  Decision making of high complexity  Action taken for acute disease activity to reflect the level of care provided:  - medication reconciliation  - review laboratory data  - gas exchange improved   - Cr decreased   - still febrile, wbc increased, and pressor requirement increased cultures negative to date, CXR unchanged   - change to merepenum, and proceed with HIDA scan  - fluid balance negative 2 liters   - follow with cvp  - start diet after HIDA scan  - sputum sample was sent for analysis  - has copious secretions  -followed by palliative and will follow with the living will  - as per partner the findngs if the metastatic disease is new

## 2019-06-15 NOTE — CHART NOTE - NSCHARTNOTEFT_GEN_A_CORE
Infectious Diseases Anti-infective Approval Note    Medication:  meropenem  Dose:  1 gram  Route:  IV  Frequency:  q12  Duration:  7 days    Dose may be adjusted as needed for alterations in renal function.    *THIS IS NOT AN INFECTIOUS DISEASES CONSULTATION*

## 2019-06-15 NOTE — PROGRESS NOTE ADULT - SUBJECTIVE AND OBJECTIVE BOX
Patient is a 69y Male seen and evaluated at bedside.   Patient was intubated on pressors.  Last 24 hours I/o with 2.2 L net negative fluid balance.   Urology team following for ileal conduit.    Meds:    ALBUTerol/ipratropium for Nebulization 3 milliLiter(s) Nebulizer every 6 hours  amiodarone    Tablet 200 milliGRAM(s) Oral daily  chlorhexidine 4% Liquid 1 Application(s) Topical <User Schedule>  dextrose 40% Gel 15 Gram(s) Oral once PRN  dextrose 5%. 1000 milliLiter(s) IV Continuous <Continuous>  dextrose 50% Injectable 12.5 Gram(s) IV Push once  dextrose 50% Injectable 25 Gram(s) IV Push once  dextrose 50% Injectable 25 Gram(s) IV Push once  glucagon  Injectable 1 milliGRAM(s) IntraMuscular once PRN  heparin  Injectable 7500 Unit(s) SubCutaneous every 8 hours  HYDROmorphone  Injectable 0.5 milliGRAM(s) IV Push every 4 hours PRN  insulin lispro (HumaLOG) corrective regimen sliding scale   SubCutaneous Before meals and at bedtime  meropenem  IVPB 1000 milliGRAM(s) IV Intermittent every 12 hours  methadone    Tablet 5 milliGRAM(s) Oral daily  norepinephrine Infusion 0.05 MICROgram(s)/kG/Min IV Continuous <Continuous>  pantoprazole  Injectable 40 milliGRAM(s) IV Push daily  propofol Infusion 5 MICROgram(s)/kG/Min IV Continuous <Continuous>  sodium bicarbonate 650 milliGRAM(s) Oral daily  vancomycin  IVPB 1250 milliGRAM(s) IV Intermittent every 24 hours      T(C): 37.7 (06-15-19 @ 14:19), Max: 38.1 (06-15-19 @ 01:02)  HR: 102 (06-15-19 @ 14:00) (66 - 132)  BP: 98/70 (06-15-19 @ 01:00) (98/70 - 98/70)  RR: 18 (06-15-19 @ 14:00) (13 - 29)  SpO2: 98% (06-15-19 @ 14:00) (96% - 99%)    Input/Output      19 @ 07:01  -  06-15-19 @ 07:00  --------------------------------------------------------  IN: 1813.3 mL / OUT: 4110 mL / NET: -2296.7 mL    06-15-19 @ 07:01  -  06-15-19 @ 15:06  --------------------------------------------------------  IN: 391.6 mL / OUT: 800 mL / NET: -408.4 mL          Review of Systems:  Limited. Intubated.      PHYSICAL EXAM:  Intubated, sedated  NECK: Neck supple, No JVD  CHEST/LUNG: Bilateral decreased breath sounds, Bibasilar rales and crepitations, no wheezing  HEART: Regular rate and rhythm. KJ II/VI at LPSB, No gallop, no rub   ABDOMEN: Soft, Nontender, non distended, bowel sounds present, surgical scar with escar, Ileal conduit noted, No flank tenderness.   EXTREMITIES: Bilateral leg trace edema, no cyanosis, no clubbing  Neurology: AAOx2-3 no focal neurological deficit  SKIN: No rashes or lesions    LABS:                                       11.6   27.46 )-----------( 405      ( 15 Ramírez 2019 04:52 )             36.5       -15    142  |  104  |  42<H>  ----------------------------<  161<H>  3.8   |  15<L>  |  2.85<H>    Ca    8.4      15 Ramírez 2019 11:43  Phos  5.2     -15  Mg     2.1     -14    TPro  7.2  /  Alb  3.0<L>  /  TBili  0.4  /  DBili  x   /  AST  13  /  ALT  8<L>  /  AlkPhos  138<H>  -15      PT/INR - ( 2019 18:00 )   PT: 16.8 sec;   INR: 1.47          PTT - ( 2019 18:00 )  PTT:29.1 sec    Urinalysis Basic - ( 2019 14:30 )    Color: Yellow / Appearance: Clear / S.015 / pH: x  Gluc: x / Ketone: NEGATIVE  / Bili: Negative / Urobili: 0.2 E.U./dL   Blood: x / Protein: 100 mg/dL / Nitrite: NEGATIVE   Leuk Esterase: Large / RBC: 5-10 /HPF / WBC Many /HPF   Sq Epi: x / Non Sq Epi: 0-5 /HPF / Bacteria: Present /HPF                         11.3   23.38 )-----------( 367      ( 2019 05:57 )             35.1         137  |  105  |  53<H>  ----------------------------<  154<H>  4.7   |  18<L>  |  3.20<H>    Ca    7.7<L>      2019 05:57  Phos  5.3       Mg     1.6         TPro  6.6  /  Alb  2.8<L>  /  TBili  0.4  /  DBili  x   /  AST  13  /  ALT  8<L>  /  AlkPhos  115      Hemoglobin A1C, Whole Blood: 7.4 % <H> [4.0 - 5.6] ( @ 05:57)    PT/INR - ( 2019 18:00 )   PT: 16.8 sec;   INR: 1.47          PTT - ( 2019 18:00 )  PTT:29.1 sec  Urinalysis Basic - ( 2019 16:17 )    Color: Yellow / Appearance: Clear / S.020 / pH: x  Gluc: x / Ketone: NEGATIVE  / Bili: NEGATIVE / Urobili: 0.2 E.U./dL   Blood: x / Protein: >=300 mg/dL / Nitrite: NEGATIVE   Leuk Esterase: Moderate / RBC: > 10 /HPF / WBC Many /HPF   Sq Epi: x / Non Sq Epi: 0-5 /HPF / Bacteria: Present /HPF            RADIOLOGY & ADDITIONAL STUDIES:    < from: Xray Chest 1 View-PORTABLE IMMEDIATE (19 @ 01:58) >    EXAM:  XR CHEST PORTABLE IMMED 1V                          PROCEDURE DATE:  2019          INTERPRETATION:    XR CHEST IMMEDIATE dated 2019 1:58 AM    HISTORY: Cough    COMPARISON: Chest radiograph from 2019.    FINDINGS:   The patient is rotated to the right. Within that limitation, the   cardiomediastinal silhouette is stable. There is slightly worsening   pulmonary vascular congestion. There is new/worsened loculated right   pleural effusion. Lumbar spine hardware.      IMPRESSION:   Worsening pulmonary vascular congestion and right pleural effusion.                Thank you for the opportunity to participate in the care of this patient.    NATASHA VALADEZ M.D., RADIOLOGY RESIDENT  This document has been electronically signed.  JR BRANDON   This document has been electronically signed. 2019 10:33AM                  < end of copied text > Patient is a 69y Male seen and evaluated at bedside.   Patient was intubated on pressors.  Last 24 hours I/o with 2.2 L net negative fluid balance. -- 4L urine output after diuretics   Urology team following for ileal conduit.  FIo2 down to 40%    Meds:    ALBUTerol/ipratropium for Nebulization 3 milliLiter(s) Nebulizer every 6 hours  amiodarone    Tablet 200 milliGRAM(s) Oral daily  chlorhexidine 4% Liquid 1 Application(s) Topical <User Schedule>  dextrose 40% Gel 15 Gram(s) Oral once PRN  dextrose 5%. 1000 milliLiter(s) IV Continuous <Continuous>  dextrose 50% Injectable 12.5 Gram(s) IV Push once  dextrose 50% Injectable 25 Gram(s) IV Push once  dextrose 50% Injectable 25 Gram(s) IV Push once  glucagon  Injectable 1 milliGRAM(s) IntraMuscular once PRN  heparin  Injectable 7500 Unit(s) SubCutaneous every 8 hours  HYDROmorphone  Injectable 0.5 milliGRAM(s) IV Push every 4 hours PRN  insulin lispro (HumaLOG) corrective regimen sliding scale   SubCutaneous Before meals and at bedtime  meropenem  IVPB 1000 milliGRAM(s) IV Intermittent every 12 hours  methadone    Tablet 5 milliGRAM(s) Oral daily  norepinephrine Infusion 0.05 MICROgram(s)/kG/Min IV Continuous <Continuous>  pantoprazole  Injectable 40 milliGRAM(s) IV Push daily  propofol Infusion 5 MICROgram(s)/kG/Min IV Continuous <Continuous>  sodium bicarbonate 650 milliGRAM(s) Oral daily  vancomycin  IVPB 1250 milliGRAM(s) IV Intermittent every 24 hours      T(C): 37.7 (06-15-19 @ 14:19), Max: 38.1 (06-15-19 @ 01:02)  HR: 102 (06-15-19 @ 14:00) (66 - 132)  BP: 98/70 (06-15-19 @ 01:00) (98/70 - 98/70)  RR: 18 (06-15-19 @ 14:00) (13 - 29)  SpO2: 98% (06-15-19 @ 14:00) (96% - 99%)    Input/Output      19 @ 07:01  -  06-15-19 @ 07:00  --------------------------------------------------------  IN: 1813.3 mL / OUT: 4110 mL / NET: -2296.7 mL    06-15-19 @ 07:01  -  06-15-19 @ 15:06  --------------------------------------------------------  IN: 391.6 mL / OUT: 800 mL / NET: -408.4 mL          Review of Systems:  Limited. Intubated.      PHYSICAL EXAM:  Intubated, sedated  NECK: Neck supple, No JVD  CHEST/LUNG: Bilateral decreased breath sounds, Bibasilar rales and crepitations, no wheezing  HEART: Regular rate and rhythm. KJ II/VI at LPSB, No gallop, no rub   ABDOMEN: Soft, Nontender, non distended, bowel sounds present, surgical scar with escar, Ileal conduit noted, No flank tenderness.   EXTREMITIES: Bilateral leg trace edema, no cyanosis, no clubbing  Neurology: AAOx2-3 no focal neurological deficit  SKIN: No rashes or lesions    LABS:                                       11.6   27.46 )-----------( 405      ( 15 Ramírez 2019 04:52 )             36.5       06-15    142  |  104  |  42<H>  ----------------------------<  161<H>  3.8   |  15<L>  |  2.85<H>    Ca    8.4      15 Ramírez 2019 11:43  Phos  5.2     06-15  Mg     2.1     06-14    TPro  7.2  /  Alb  3.0<L>  /  TBili  0.4  /  DBili  x   /  AST  13  /  ALT  8<L>  /  AlkPhos  138<H>  06-15      PT/INR - ( 2019 18:00 )   PT: 16.8 sec;   INR: 1.47          PTT - ( 2019 18:00 )  PTT:29.1 sec    Urinalysis Basic - ( 2019 14:30 )    Color: Yellow / Appearance: Clear / S.015 / pH: x  Gluc: x / Ketone: NEGATIVE  / Bili: Negative / Urobili: 0.2 E.U./dL   Blood: x / Protein: 100 mg/dL / Nitrite: NEGATIVE   Leuk Esterase: Large / RBC: 5-10 /HPF / WBC Many /HPF   Sq Epi: x / Non Sq Epi: 0-5 /HPF / Bacteria: Present /HPF                         11.3   23.38 )-----------( 367      ( 2019 05:57 )             35.1         137  |  105  |  53<H>  ----------------------------<  154<H>  4.7   |  18<L>  |  3.20<H>    Ca    7.7<L>      2019 05:57  Phos  5.3       Mg     1.6         TPro  6.6  /  Alb  2.8<L>  /  TBili  0.4  /  DBili  x   /  AST  13  /  ALT  8<L>  /  AlkPhos  115      Hemoglobin A1C, Whole Blood: 7.4 % <H> [4.0 - 5.6] ( @ 05:57)    PT/INR - ( 2019 18:00 )   PT: 16.8 sec;   INR: 1.47          PTT - ( 2019 18:00 )  PTT:29.1 sec  Urinalysis Basic - ( 2019 16:17 )    Color: Yellow / Appearance: Clear / S.020 / pH: x  Gluc: x / Ketone: NEGATIVE  / Bili: NEGATIVE / Urobili: 0.2 E.U./dL   Blood: x / Protein: >=300 mg/dL / Nitrite: NEGATIVE   Leuk Esterase: Moderate / RBC: > 10 /HPF / WBC Many /HPF   Sq Epi: x / Non Sq Epi: 0-5 /HPF / Bacteria: Present /HPF            RADIOLOGY & ADDITIONAL STUDIES:    < from: Xray Chest 1 View-PORTABLE IMMEDIATE (19 @ 01:58) >    EXAM:  XR CHEST PORTABLE IMMED 1V                          PROCEDURE DATE:  2019          INTERPRETATION:    XR CHEST IMMEDIATE dated 2019 1:58 AM    HISTORY: Cough    COMPARISON: Chest radiograph from 2019.    FINDINGS:   The patient is rotated to the right. Within that limitation, the   cardiomediastinal silhouette is stable. There is slightly worsening   pulmonary vascular congestion. There is new/worsened loculated right   pleural effusion. Lumbar spine hardware.      IMPRESSION:   Worsening pulmonary vascular congestion and right pleural effusion.                Thank you for the opportunity to participate in the care of this patient.    NATASHA VALADEZ M.D., RADIOLOGY RESIDENT  This document has been electronically signed.  JR BRANDON   This document has been electronically signed. 2019 10:33AM                  < end of copied text >

## 2019-06-16 NOTE — PROGRESS NOTE ADULT - ASSESSMENT
68 y/o M PMH HTN, chronic back pain, s/p radical cystectomy p/w SOB, found to have severe metabolic and respiratory acidemia, urosepsis with likely left pyelonephritis, with likely acute ATN on CKD, severe dehydration, likely metastatic cancer, admitted to MICU for emergent HD and further workup.    # Non-oliguric HUGO   - prerenal HUGO in setting of hypotension and with cr improving with IVF  vs ATN in setting of hypotension and sepsis  - Patient may have had ckd in the past secondary to chronic obstruction as  CT scan showed r renal atrophy with moderate hydronephrosis.  - unknown prior baseline, but improving with management of urosepsis  - Cr 4.4 on admission > 2.8 > 2.9  today - possibly with component of dehydration in setting of hypernatremia  - UOP 1.7 L in past 24 hrs off diuretics  - Obtain repeat urinalysis, Urine PCR 1.6  - Urology team following for ileal conduit and urostomy  - monitor urine output via urostomy bag  - Adjust antibiotics as per renal dose clearance  - Monitor BMP  - Volume status acceptable for now  - Continue diuretics as needed    # Hypernatremia - Na 149  - recommend to stop bicarb tabs  - recommend 1 amp bicarb in 1 L D5W at 100 cc/hr    # Respiratory acidosis with anion gap metabolic acidosis with metabolic alkalosis  - now intubated on vent  - bicarb 16  - recommend 1 amp bicarb in 1 L D5W at 100 cc/hr 68 y/o M PMH HTN, chronic back pain, s/p radical cystectomy p/w SOB, found to have severe metabolic and respiratory acidemia, urosepsis with likely left pyelonephritis, with likely acute ATN on CKD, severe dehydration, likely metastatic cancer, admitted to MICU for emergent HD and further workup.    # Non-oliguric HUGO   - prerenal HUGO in setting of hypotension and with cr improving with IVF  vs ATN in setting of hypotension and sepsis  - Patient may have had ckd in the past secondary to chronic obstruction as  CT scan showed r renal atrophy with moderate hydronephrosis.  - unknown prior baseline, but improving with management of urosepsis  - Cr 4.4 on admission > 2.8 > 2.9  today - possibly with component of dehydration in setting of hypernatremia  - UOP 1.7 L in past 24 hrs off diuretics  - Obtain repeat urinalysis, Urine PCR 1.6  - Urology team following for ileal conduit and urostomy  - monitor urine output via urostomy bag  - Adjust antibiotics as per renal dose clearance  - Monitor BMP  - Volume status acceptable for now-- may be dry - suggest IVF as below   - Continue diuretics as needed-- hold    # Hypernatremia - Na 149  - recommend to stop bicarb tabs  - recommend 1 amp bicarb in 1 L D5W at 100 cc/hr    # Respiratory acidosis with anion gap metabolic acidosis with metabolic alkalosis  - now intubated on vent  - bicarb 16  - recommend 1 amp bicarb in 1 L D5W at 100 cc/hr

## 2019-06-16 NOTE — PROGRESS NOTE ADULT - ASSESSMENT
68 y/o M PMH smoker (50 years and current), sleep apnea w/o cpap, short-term mem loss, HTN, DM2, chronic back pain s/p multiple back surgeries w/ hardware on methadone, anxiety, SBO s/p sx c/b wound that never healed, prostate cancer 1999 s/p radiation c/b radical cystectomy and nephrostomy w/ ileal conduit (in remission), p/w SOB, found to have severe acidemia, new onset suspected lung cancer w/ mets, admitted to MICU for emergent HD and further workup.    Neurology  #agitation/ anxiety hx/ short term mem loss hx  -sedation and intubation until clinically improves  -c/w home sertraline when not sedated    #AMS  likely 2/2 uremia vs sepsis vs baseline per partner when has short term mem loss and gets confused and agitated  -intubated and sedated  -tx underlying cause as below    Cardiac  #mild aortic aneurysm  dilation of the aortic root and ascending aorta on ct, no known hx  -control sBP <150   -consider echo    #hypotension  sepsis vs. cardiogenic vs 2/2 opiates  -c/w levophed for now, titrate as BP tolerates    #htn  hx of htn  -currently on pressors - holding home medications    Pulmonary  #lung cancer w/ mets  CT chest/ abd w/o contrast due to cr 4.5 showing 7cm RLL mass concerning for neoplasm w/ suspected hepatic and adrenal mets and abd LAD on ct  -obtain collateral about hx of cancer/ treatment from partner or pmd  -outpt f/u  -pal care consult  -venous dopplers negative  -consider bronch monday for potential BAL for cytology    #smoker   50 years and current, no recent ct scans  -nicotine patch prn when pt not sedated    #sleep apnea  offered cpap but did not use  -currently intubated    #COPD  no dx, no inhalers, emphysema on CT scan of chest, suspect resp acidosis 2/2 COPD that is chronic and preventing compensation of met acidosis  -intubation to decrease WOB and increase RR if needed to compensate for metabolic acidosis  -duonebs q6 standing     #r/o PNA  CT chest cannot r/o PNA, cxr shows no obvious infiltrate but pt SOB w/ tachypnea and WBC 30, no hypoxia, no fevers, s/p vanc/ cefepime in ED--> vanc and zosyn  -sputum cx f/u  -daily cxr  -vanc zosyn and tito as below  -f/u bcx- ngtd    Renal  #uremia  bun ~70, unclear baseline, renal consulted--> improving 53  -f/u renal recs  -q12 bmp  -HD in near future?  -monitor UO - goal 45cc/hr if not meeting goal than will tell renal need more urgent HD    #HUGO vs CKD  Cr 4.5, unknown baseline, improving to ~3  -obtain collateral on cr baseline   -renally dose medications  -as above    Metabolic  #AG metabolic acidosis uncompnesated  likely 2/2 uremia, pH vbg 7.12-->7.08-->improved to 7.37 after bicarb drip  and IVF LR (turned off bc fluid overloaded) but then decreased again after bicarb gtt off  -monitor abg,   -renal consult as above  -intubated as above for resp compensation (high vt and rr)    ID  # sepsis 2/2 pyelonephritis vs cholecystitis  wbc 30--> 23, w/ tachypnea to 22 meets 2/4 sirs crtiria, afebrile, lactate 2.6. cxr clear, ct chest cannot r/o consolidation in RLL under mass. ct scan showing perinephritic stranding and mod hydronephrosis concerning for UTI/ nephritis UA+, also shows distended gallbladder w/ stones concerning for cholecystitis  - urine cx  -bcx  -sputum cx  -monitor wbc  -RUQ US w hepatomegaly, 2 solid liver lesions, markedly distended gallbladder w sludge and gallstones; moderate R hydronephrosis  -vanc/ zosyn for broad coverage   - initiated meropenem as pt was spiking through zosyn (6/15- )  -wound care for skin wound over abdomen (SBO s/p sx c/b wound that never healed, )  -vanc to cover possible cellulitis of abdomen wound vs pna  - f/u HIDA scan    Endocrine  #DM2  glucose 249, a1c 7.4, on home metformin  -ISS  -add regimen if needed    #hyperkalemia  k 5.7--> now resolved 4.5 s/p insulin/d50 and bicarb drip  -monitor bmp    MSK  #chronic back pain  chronic back pain s/p multiple back surgeries w/ hardware on methadone, utox  positive for methadone only which per partner pt gest from pain specialist Dr Kwan, takes  5mg 2-4 pills qd   -methadone 5-20 mg qd, higher end if neede for pain control when off sedation        #cystectomy with nephrostomy  ct showing mod hydro but draining urine, R kidney atrophy, prostate cancer 1999 s/p radiation c/b radical cystectomy and nephrostomy w/ ileal conduit (in remission)  -urology consulted, f.u recs  -monitor UO from nephrostomy bag    Heme  anemia, normocytic, liekly dilutional s/p IVF anf all cell lines decreased, cbc stable  -monitor CBC    OTHER  F: none  E: replete PRN but cautious in HUGO vs CKD requiring HD  N: npo  DVT ppx: HSQ 5000 q8  other ppx: ppi  FULL CODE  dispo: MICU  Ambulate as tolerated 70 y/o M PMH smoker (50 years and current), sleep apnea w/o cpap, short-term mem loss, HTN, DM2, chronic back pain s/p multiple back surgeries w/ hardware on methadone, anxiety, SBO s/p sx c/b wound that never healed, prostate cancer 1999 s/p radiation c/b radical cystectomy and nephrostomy w/ ileal conduit (in remission), p/w SOB, found to have severe acidemia, sepsis 2/2 pyelonephritis vs PNA, new onset suspected lung cancer w/ mets, admitted to MICU.    Neurology  #agitation/ anxiety hx/ short term mem loss hx  -sedation and intubation until clinically improves  -c/w home sertraline when not sedated    #AMS  likely 2/2 uremia vs sepsis vs baseline per partner when has short term mem loss and gets confused and agitated  -intubated and sedated  -tx underlying cause as below    Cardiac  #mild aortic aneurysm  dilation of the aortic root and ascending aorta on ct, no known hx  -control sBP <150   -f/u echo    #hypotension  sepsis vs. cardiogenic vs 2/2 opiates  -c/w levophed for now, titrate as BP tolerates    #htn  hx of htn  -currently on pressors - holding home medications    Pulmonary  #lung cancer w/ mets  CT chest/ abd w/o contrast due to cr 4.5 showing 7cm RLL mass concerning for neoplasm w/ suspected hepatic and adrenal mets and abd LAD on ct  -obtain collateral about hx of cancer/ treatment from partner or pmd  -outpt f/u  -pal care consult  -venous dopplers negative  -consider bronch monday for potential BAL for cytology    #smoker   50 years and current, no recent ct scans  -nicotine patch prn when pt not sedated    #sleep apnea  offered cpap but did not use  -currently intubated    #COPD  no dx, no inhalers, emphysema on CT scan of chest, suspect resp acidosis 2/2 COPD that is chronic and preventing compensation of met acidosis  -intubation to decrease WOB and increase RR if needed to compensate for metabolic acidosis  -duonebs q6 standing     #r/o PNA  CT chest cannot r/o PNA, cxr shows no obvious infiltrate but pt SOB w/ tachypnea and WBC 30, no hypoxia, no fevers, s/p vanc/ cefepime in ED--> vanc and zosyn  -sputum cx f/u  -daily cxr  -vanc zosyn and tito as below  -f/u bcx- ngtd    Renal  #uremia  bun ~70, unclear baseline, renal consulted--> improving 53  -f/u renal recs  -q12 bmp  -HD in near future?  -monitor UO - goal 45cc/hr if not meeting goal than will tell renal need more urgent HD    #HUGO vs CKD  Cr 4.5, unknown baseline, improving to ~3  -obtain collateral on cr baseline   -renally dose medications  -as above    Metabolic  #AG metabolic acidosis uncompnesated  likely 2/2 uremia, pH vbg 7.12-->7.08-->improved to 7.37 after bicarb drip  and IVF LR (turned off bc fluid overloaded) but then decreased again after bicarb gtt off  -monitor abg,   -renal consult as above  -intubated as above for resp compensation (high vt and rr)    ID  # sepsis 2/2 pyelonephritis vs cholecystitis  wbc 30--> 23, w/ tachypnea to 22 meets 2/4 sirs crtiria, afebrile, lactate 2.6. cxr clear, ct chest cannot r/o consolidation in RLL under mass. ct scan showing perinephritic stranding and mod hydronephrosis concerning for UTI/ nephritis UA+, also shows distended gallbladder w/ stones concerning for cholecystitis  - urine cx  -bcx  -sputum cx  -monitor wbc  -RUQ US w hepatomegaly, 2 solid liver lesions, markedly distended gallbladder w sludge and gallstones; moderate R hydronephrosis  -vanc/ zosyn for broad coverage   - initiated meropenem as pt was spiking through zosyn (6/15- )  -wound care for skin wound over abdomen (SBO s/p sx c/b wound that never healed, )  -vanc to cover possible cellulitis of abdomen wound vs pna  - f/u HIDA scan    Endocrine  #DM2  glucose 249, a1c 7.4, on home metformin  -ISS  -add regimen if needed    #hyperkalemia  k 5.7--> now resolved 4.5 s/p insulin/d50 and bicarb drip  -monitor bmp    MSK  #chronic back pain  chronic back pain s/p multiple back surgeries w/ hardware on methadone, utox  positive for methadone only which per partner pt gest from pain specialist Dr Kwan, takes  5mg 2-4 pills qd   -methadone 5-20 mg qd, higher end if neede for pain control when off sedation        #cystectomy with nephrostomy  ct showing mod hydro but draining urine, R kidney atrophy, prostate cancer 1999 s/p radiation c/b radical cystectomy and nephrostomy w/ ileal conduit (in remission)  -urology consulted, f.u recs  -monitor UO from nephrostomy bag    Heme  anemia, normocytic, liekly dilutional s/p IVF anf all cell lines decreased, cbc stable  -monitor CBC    OTHER  F: none  E: replete PRN but cautious in HUGO vs CKD requiring HD  N: npo  DVT ppx: HSQ 5000 q8  other ppx: ppi  FULL CODE  dispo: MICU  Ambulate as tolerated 68 y/o M PMH smoker (50 years and current), sleep apnea w/o cpap, short-term mem loss, HTN, DM2, chronic back pain s/p multiple back surgeries w/ hardware on methadone, anxiety, SBO s/p sx c/b wound that never healed, prostate cancer 1999 s/p radiation c/b radical cystectomy and nephrostomy w/ ileal conduit (in remission), p/w SOB, found to have severe acidemia, sepsis 2/2 pyelonephritis vs PNA, new onset suspected lung cancer w/ mets, admitted to MICU.    Neurology  #agitation/ anxiety hx/ short term mem loss hx  -sedation and intubation until clinically improves  -c/w home sertraline when not sedated    #AMS  likely 2/2 uremia vs sepsis vs baseline per partner when has short term mem loss and gets confused and agitated  -intubated and sedated  -tx underlying cause as below    Cardiac  #mild aortic aneurysm  dilation of the aortic root and ascending aorta on ct, no known hx  -control sBP <150   -f/u echo    #hypotension  likely 2/2 sedation and sepsis  -c/w levophed for now, titrate as BP tolerates    #htn  hx of htn  -currently on pressors - holding home medications    Pulmonary  #lung cancer w/ mets  CT chest/ abd w/o contrast due to cr 4.5 showing 7cm RLL mass concerning for neoplasm w/ suspected hepatic and adrenal mets and abd LAD on ct  -obtain collateral about hx of cancer/ treatment from partner or pmd  -outpt f/u  -pal care consult  -venous dopplers negative  -consider bronch monday for potential BAL for cytology    #smoker   50 years and current, no recent ct scans  -nicotine patch prn when pt not sedated    #sleep apnea  offered cpap but did not use  -currently intubated    #COPD  no dx, no inhalers, emphysema on CT scan of chest, suspect resp acidosis 2/2 COPD that is chronic and preventing compensation of met acidosis  -intubation to decrease WOB and increase RR if needed to compensate for metabolic acidosis  -duonebs q6 standing     #r/o PNA  CT chest cannot r/o PNA, cxr shows no obvious infiltrate but pt SOB w/ tachypnea and WBC 30, no hypoxia, no fevers, s/p vanc/ cefepime in ED--> vanc and zosyn  -sputum cx f/u  -daily cxr  -vanc zosyn and tito as below  -f/u bcx- ngtd    Renal  #uremia  bun ~70, unclear baseline, renal consulted--> improving 53  -f/u renal recs  -q12 bmp  -HD in near future?  -monitor UO - goal 45cc/hr if not meeting goal than will tell renal need more urgent HD    #HUGO vs CKD  Cr 4.5, unknown baseline, improving to ~3  -renally dose medications  -as above    Metabolic  #AG metabolic acidosis uncompnesated  likely 2/2 uremia, pH vbg 7.12-->7.08-->improved to 7.37 after bicarb drip  and IVF LR (turned off bc fluid overloaded) but then decreased again after bicarb gtt off  -monitor abg,   -renal consult as above  -intubated as above for resp compensation (high vt and rr)  -sodium bicarb 650 bid    #hypernatremia  was on sodium bicarb 1300 tid, now decreased dose, and changed drips and meds fluids from ns to d5    ID  # sepsis 2/2 pyelonephritis vs PNA  wbc 30--> 23, w/ tachypnea to 22 meets 2/4 sirs crtiria, afebrile, lactate 2.6. cxr clear, ct chest cannot r/o consolidation in RLL under mass. ct scan showing perinephritic stranding and mod hydronephrosis concerning for UTI/ nephritis UA+, also shows distended gallbladder w/ stones concerning for cholecystitis, urine cx and bcx ngtd  -sputum cx f/u  -monitor wbc  -RUQ US w hepatomegaly, 2 solid liver lesions, markedly distended gallbladder w sludge and gallstones; moderate R hydronephrosis  -vanc/ tito for broad coverage - initiated meropenem as pt was spiking through zosyn (6/15- )  -wound care for skin wound over abdomen (SBO s/p sx c/b wound that never healed, )  -vanc to cover possible cellulitis of abdomen wound vs pna  -  HIDA scan shoes no cholecysitits    Endocrine  #DM2  glucose 249, a1c 7.4, on home metformin  -ISS  -add regimen if needed    #hyperkalemia  k 5.7--> now resolved 4.5 s/p insulin/d50 and bicarb drip  -monitor bmp    MSK  #chronic back pain  chronic back pain s/p multiple back surgeries w/ hardware on methadone, utox  positive for methadone only which per partner pt gest from pain specialist Dr Kwan, takes  5mg 2-4 pills qd   -methadone 5-20 mg qd, higher end if neede for pain control when off sedation        #cystectomy with nephrostomy  ct showing mod hydro but draining urine, R kidney atrophy, prostate cancer 1999 s/p radiation c/b radical cystectomy and nephrostomy w/ ileal conduit (in remission)  -urology consulted, f.u recs  -monitor UO from nephrostomy bag    Heme  anemia, normocytic, liekly dilutional s/p IVF anf all cell lines decreased, cbc stable  -monitor CBC    OTHER  F: none  E: replete PRN but cautious in HUGO vs CKD requiring HD  N: npo  DVT ppx: HSQ 5000 q8  other ppx: ppi  FULL CODE  dispo: MICU  Ambulate as tolerated

## 2019-06-16 NOTE — PROGRESS NOTE ADULT - SUBJECTIVE AND OBJECTIVE BOX
Patient is a 69y old  Male who presents with a chief complaint of acidemia (2019 07:55)      INTERVAL HPI/OVERNIGHT EVENTS: Pt seen and examined at bedside. Intubated and sedated.      ICU Vital Signs Last 24 Hrs  T(C): 39.1 (2019 10:10), Max: 39.1 (2019 10:10)  T(F): 102.3 (2019 10:10), Max: 102.3 (2019 10:10)  HR: 98 (2019 11:00) (84 - 126)  BP: 111/56 (2019 09:00) (87/42 - 119/59)  BP(mean): 76 (2019 09:00) (63 - 90)  ABP: 104/56 (2019 11:00) (74/62 - 132/85)  ABP(mean): 72 (2019 11:00) (62 - 95)  RR: 21 (2019 11:00) (18 - 30)  SpO2: 98% (2019 11:00) (95% - 99%)    I&O's Summary    [address] 2019 07:  -  2019 07:00  --------------------------------------------------------  IN: 2246.1 mL / OUT: 1775 mL / NET: 471.1 mL    2019 07:01  -  2019 11:21  --------------------------------------------------------  IN: 107 mL / OUT: 185 mL / NET: -78 mL      Mode: AC/ CMV (Assist Control/ Continuous Mandatory Ventilation)  RR (machine): 18  TV (machine): 550  FiO2: 40  PEEP: 5  ITime: 1  MAP: 17  PIP: 19      LABS:                        11.5   28.75 )-----------( 431      ( 2019 06:24 )             36.9     06-16    149<H>  |  112<H>  |  41<H>  ----------------------------<  158<H>  4.0   |  17<L>  |  3.08<H>    Ca    8.7      2019 06:24  Phos  4.5     06-  Mg     1.8         TPro  7.1  /  Alb  2.8<L>  /  TBili  0.3  /  DBili  <0.2  /  AST  15  /  ALT  6<L>  /  AlkPhos  141<H>        Urinalysis Basic - ( 2019 14:30 )    Color: Yellow / Appearance: Clear / S.015 / pH: x  Gluc: x / Ketone: NEGATIVE  / Bili: Negative / Urobili: 0.2 E.U./dL   Blood: x / Protein: 100 mg/dL / Nitrite: NEGATIVE   Leuk Esterase: Large / RBC: 5-10 /HPF / WBC Many /HPF   Sq Epi: x / Non Sq Epi: 0-5 /HPF / Bacteria: Present /HPF      CAPILLARY BLOOD GLUCOSE      POCT Blood Glucose.: 145 mg/dL (2019 06:12)  POCT Blood Glucose.: 132 mg/dL (15 Ram[address]rez 2019 22:14)  POCT Blood Glucose.: 155 mg/dL (15 [address] 2019 18:35)  POCT Blood Glucose.: 143 mg/dL (15 [address] 2019 11:45)    ABG - ( 15 [address] 2019 05:04 )  pH, Arterial: 7.30  pH, Blood: x     /  pCO2: 31    /  pO2: 127   / HCO3: 15    / Base Excess: -10.5 /  SaO2: 98                  RADIOLOGY & ADDITIONAL TESTS:    Consultant(s) Notes Reviewed:  [x ] YES  [ ] NO    MEDICATIONS  (STANDING):  ALBUTerol/ipratropium for Nebulization 3 milliLiter(s) Nebulizer every 6 hours  amiodarone    Tablet 200 milliGRAM(s) Oral daily  chlorhexidine 0.12% Liquid 15 milliLiter(s) Oral Mucosa two times a day  chlorhexidine 4% Liquid 1 Application(s) Topical <User Schedule>  dextrose 5%. 1000 milliLiter(s) (50 mL/Hr) IV Continuous <Continuous>  dextrose 50% Injectable 12.5 Gram(s) IV Push once  dextrose 50% Injectable 25 Gram(s) IV Push once  dextrose 50% Injectable 25 Gram(s) IV Push once  fentaNYL   Infusion. 0.5 MICROgram(s)/kG/Hr (4.785 mL/Hr) IV Continuous <Continuous>  heparin  Injectable 7500 Unit(s) SubCutaneous every 8 hours  insulin lispro (HumaLOG) corrective regimen sliding scale   SubCutaneous Before meals and at bedtime  meropenem  IVPB 1000 milliGRAM(s) IV Intermittent every 12 hours  methadone    Tablet 5 milliGRAM(s) Oral daily  norepinephrine Infusion 0.05 MICROgram(s)/kG/Min (8.972 mL/Hr) IV Continuous <Continuous>  pantoprazole  Injectable 40 milliGRAM(s) IV Push daily  propofol Infusion 5 MICROgram(s)/kG/Min (2.871 mL/Hr) IV Continuous <Continuous>  sodium bicarbonate 650 milliGRAM(s) Oral every 12 hours    MEDICATIONS  (PRN):  dextrose 40% Gel 15 Gram(s) Oral once PRN Blood Glucose LESS THAN 70 milliGRAM(s)/deciliter  glucagon  Injectable 1 milliGRAM(s) IntraMuscular once PRN Glucose LESS THAN 70 milligrams/deciliter  HYDROmorphone  Injectable 0.5 milliGRAM(s) IV Push every 4 hours PRN Severe Pain (7 - 10)      PE:  Constitutional: intubated and sedated  	Head: NC/AT  	Eyes: PERRL, EOMI, clear conjunctiva  	ENT: no nasal discharge; uvula midline, no oropharyngeal erythema or exudates; bipap mask on  	Neck: supple; no JVD or thyromegaly  	Respiratory: diffuse rhonchi  	Cardiac: +S1/S2; RRR; no M/R/G;   	Gastrointestinal: distended abd with hard bulges, mildly TTP w/o rebound or guarding, nephrostomy bag in place in abdomen, dark red/brown large scab/ wound over central abdomen   	Extremities:  scaling grey skin in LEs, no edema  	Musculoskeletal: NROM x4; no joint swelling, tenderness or erythema  	:  nephrostomy draining urine from center of belly, no scrotal edema  Neurologic: sedated

## 2019-06-16 NOTE — PROGRESS NOTE ADULT - ATTENDING COMMENTS
Na and creat up. does no tapear fluid overloaded now and wonder if on dry side  suggest d5w with one amp nahco3 at 80 cc/hr -for free water and some volume repletion

## 2019-06-16 NOTE — PROGRESS NOTE ADULT - ATTENDING COMMENTS
Patient seen and examined with house-staff during bedside rounds.  Resident note read, including vitals, physical findings, laboratory data, and radiological reports.   Revisions included below.  Direct personal management at bed side and extensive interpretation of the data.  Plan was outlined and discussed in details with the housestaff.  Decision making of high complexity  Action taken for acute disease activity to reflect the level of care provided:  - medication reconciliation  - review laboratory data  Continue mechanical ventilation. Gas exchange improved. Compliance is stable. Patient is still on pressors. Unable to wean off the norepinephrine. The patient is still spiking fever.  Sputum culture is pendign.  Continue on current antibiotics.  fluid status is stable.  Cotinue sedation

## 2019-06-16 NOTE — PROGRESS NOTE ADULT - SUBJECTIVE AND OBJECTIVE BOX
Patient is a 69y Male seen and evaluated at bedside.   Patient remains intubated on low dose pressor.  Last 24 hours I/o with 471 mls pos fluid balance, 1.7 L urine output.  Urology team following for ileal conduit.  FIo2 down to 40%      Meds:    ALBUTerol/ipratropium for Nebulization 3 milliLiter(s) Nebulizer every 6 hours  amiodarone    Tablet 200 milliGRAM(s) Oral daily  chlorhexidine 0.12% Liquid 15 milliLiter(s) Oral Mucosa two times a day  chlorhexidine 4% Liquid 1 Application(s) Topical <User Schedule>  dextrose 40% Gel 15 Gram(s) Oral once PRN  dextrose 5%. 1000 milliLiter(s) IV Continuous <Continuous>  dextrose 5%. 1000 milliLiter(s) IV Continuous <Continuous>  dextrose 50% Injectable 12.5 Gram(s) IV Push once  dextrose 50% Injectable 25 Gram(s) IV Push once  dextrose 50% Injectable 25 Gram(s) IV Push once  fentaNYL   Infusion. 0.5 MICROgram(s)/kG/Hr IV Continuous <Continuous>  glucagon  Injectable 1 milliGRAM(s) IntraMuscular once PRN  heparin  Injectable 7500 Unit(s) SubCutaneous every 8 hours  HYDROmorphone  Injectable 0.5 milliGRAM(s) IV Push every 4 hours PRN  insulin lispro (HumaLOG) corrective regimen sliding scale   SubCutaneous Before meals and at bedtime  meropenem  IVPB 1000 milliGRAM(s) IV Intermittent every 12 hours  methadone    Tablet 5 milliGRAM(s) Oral daily  norepinephrine Infusion 0.05 MICROgram(s)/kG/Min IV Continuous <Continuous>  pantoprazole  Injectable 40 milliGRAM(s) IV Push daily  polyethylene glycol 3350 17 Gram(s) Oral daily  propofol Infusion 5 MICROgram(s)/kG/Min IV Continuous <Continuous>  senna 1 Tablet(s) Oral daily  sodium bicarbonate  Infusion 0.125 mEq/kG/Hr IV Continuous <Continuous>      T(C): 37.8 (19 @ 14:00), Max: 39.1 (19 @ 10:10)  HR: 66 (19 @ 17:28) (66 - 126)  BP: 104/55 (19 @ 15:00) (83/49 - 119/59)  RR: 18 (19 @ 15:00) (17 - 30)  SpO2: 97% (19 @ 17:28) (95% - 98%)    Input/Output      06-15-19 @ 07:01  -  19 @ 07:00  --------------------------------------------------------  IN: 2246.1 mL / OUT: 1775 mL / NET: 471.1 mL    19 @ 07:01  -  19 @ 17:30  --------------------------------------------------------  IN: 443.7 mL / OUT: 430 mL / NET: 13.7 mL                Review of Systems:  Limited. Intubated.      PHYSICAL EXAM:  Intubated, sedated  NECK: Neck supple, No JVD  CHEST/LUNG: Bilateral decreased breath sounds, Bibasilar rales and crepitations, no wheezing  HEART: Regular rate and rhythm. KJ II/VI at LPSB, No gallop, no rub   ABDOMEN: Soft, Nontender, non distended, bowel sounds present, surgical scar with escar, Ileal conduit noted, No flank tenderness.   EXTREMITIES: Bilateral leg trace edema, no cyanosis, no clubbing  Neurology: sedated  SKIN: No rashes or lesions    LABS:                                       11.5   28.75 )-----------( 431      ( 2019 06:24 )             36.9       06-16    151<H>  |  115<H>  |  38<H>  ----------------------------<  160<H>  3.8   |  16<L>  |  2.96<H>    Ca    8.4      2019 16:36  Phos  5.0     06-16  Mg     2.1     06-16    TPro  7.1  /  Alb  2.8<L>  /  TBili  0.3  /  DBili  <0.2  /  AST  15  /  ALT  6<L>  /  AlkPhos  141<H>                                          11.6   27.46 )-----------( 405      ( 15 Ramírez 2019 04:52 )             36.5       06-15    142  |  104  |  42<H>  ----------------------------<  161<H>  3.8   |  15<L>  |  2.85<H>    Ca    8.4      15 Ramírez 2019 11:43  Phos  5.2     -15  Mg     2.1         TPro  7.2  /  Alb  3.0<L>  /  TBili  0.4  /  DBili  x   /  AST  13  /  ALT  8<L>  /  AlkPhos  138<H>  06-15      PT/INR - ( 2019 18:00 )   PT: 16.8 sec;   INR: 1.47          PTT - ( 2019 18:00 )  PTT:29.1 sec    Urinalysis Basic - ( 2019 14:30 )    Color: Yellow / Appearance: Clear / S.015 / pH: x  Gluc: x / Ketone: NEGATIVE  / Bili: Negative / Urobili: 0.2 E.U./dL   Blood: x / Protein: 100 mg/dL / Nitrite: NEGATIVE   Leuk Esterase: Large / RBC: 5-10 /HPF / WBC Many /HPF   Sq Epi: x / Non Sq Epi: 0-5 /HPF / Bacteria: Present /HPF                         11.3   23.38 )-----------( 367      ( 2019 05:57 )             35.1         137  |  105  |  53<H>  ----------------------------<  154<H>  4.7   |  18<L>  |  3.20<H>    Ca    7.7<L>      2019 05:57  Phos  5.3     -14  Mg     1.6         TPro  6.6  /  Alb  2.8<L>  /  TBili  0.4  /  DBili  x   /  AST  13  /  ALT  8<L>  /  AlkPhos  115  14    Hemoglobin A1C, Whole Blood: 7.4 % <H> [4.0 - 5.6] ( @ 05:57)    PT/INR - ( 2019 18:00 )   PT: 16.8 sec;   INR: 1.47          PTT - ( 2019 18:00 )  PTT:29.1 sec  Urinalysis Basic - ( 2019 16:17 )    Color: Yellow / Appearance: Clear / S.020 / pH: x  Gluc: x / Ketone: NEGATIVE  / Bili: NEGATIVE / Urobili: 0.2 E.U./dL   Blood: x / Protein: >=300 mg/dL / Nitrite: NEGATIVE   Leuk Esterase: Moderate / RBC: > 10 /HPF / WBC Many /HPF   Sq Epi: x / Non Sq Epi: 0-5 /HPF / Bacteria: Present /HPF            RADIOLOGY & ADDITIONAL STUDIES:    < from: Xray Chest 1 View-PORTABLE IMMEDIATE (19 @ 01:58) >    EXAM:  XR CHEST PORTABLE IMMED 1V                          PROCEDURE DATE:  2019          INTERPRETATION:    XR CHEST IMMEDIATE dated 2019 1:58 AM    HISTORY: Cough    COMPARISON: Chest radiograph from 2019.    FINDINGS:   The patient is rotated to the right. Within that limitation, the   cardiomediastinal silhouette is stable. There is slightly worsening   pulmonary vascular congestion. There is new/worsened loculated right   pleural effusion. Lumbar spine hardware.      IMPRESSION:   Worsening pulmonary vascular congestion and right pleural effusion.                Thank you for the opportunity to participate in the care of this patient.    NATASHA VALADEZ M.D., RADIOLOGY RESIDENT  This document has been electronically signed.  JR BRANDON   This document has been electronically signed. 2019 10:33AM                  < end of copied text > Patient is a 69y Male seen and evaluated at bedside.   Patient remains intubated on low dose pressor.  Last 24 hours I/o with 471 mls pos fluid balance, 1.7 L urine output.  Urology team following for ileal conduit.  FIo2 down to 40%      Meds:    ALBUTerol/ipratropium for Nebulization 3 milliLiter(s) Nebulizer every 6 hours  amiodarone    Tablet 200 milliGRAM(s) Oral daily  chlorhexidine 0.12% Liquid 15 milliLiter(s) Oral Mucosa two times a day  chlorhexidine 4% Liquid 1 Application(s) Topical <User Schedule>  dextrose 40% Gel 15 Gram(s) Oral once PRN  dextrose 5%. 1000 milliLiter(s) IV Continuous <Continuous>  dextrose 5%. 1000 milliLiter(s) IV Continuous <Continuous>  dextrose 50% Injectable 12.5 Gram(s) IV Push once  dextrose 50% Injectable 25 Gram(s) IV Push once  dextrose 50% Injectable 25 Gram(s) IV Push once  fentaNYL   Infusion. 0.5 MICROgram(s)/kG/Hr IV Continuous <Continuous>  glucagon  Injectable 1 milliGRAM(s) IntraMuscular once PRN  heparin  Injectable 7500 Unit(s) SubCutaneous every 8 hours  HYDROmorphone  Injectable 0.5 milliGRAM(s) IV Push every 4 hours PRN  insulin lispro (HumaLOG) corrective regimen sliding scale   SubCutaneous Before meals and at bedtime  meropenem  IVPB 1000 milliGRAM(s) IV Intermittent every 12 hours  methadone    Tablet 5 milliGRAM(s) Oral daily  norepinephrine Infusion 0.05 MICROgram(s)/kG/Min IV Continuous <Continuous>  pantoprazole  Injectable 40 milliGRAM(s) IV Push daily  polyethylene glycol 3350 17 Gram(s) Oral daily  propofol Infusion 5 MICROgram(s)/kG/Min IV Continuous <Continuous>  senna 1 Tablet(s) Oral daily  sodium bicarbonate  Infusion 0.125 mEq/kG/Hr IV Continuous <Continuous>      T(C): 37.8 (19 @ 14:00), Max: 39.1 (19 @ 10:10)  HR: 66 (19 @ 17:28) (66 - 126)  BP: 104/55 (19 @ 15:00) (83/49 - 119/59)  RR: 18 (19 @ 15:00) (17 - 30)  SpO2: 97% (19 @ 17:28) (95% - 98%)    Input/Output      06-15-19 @ 07:01  -  19 @ 07:00  --------------------------------------------------------  IN: 2246.1 mL / OUT: 1775 mL / NET: 471.1 mL    19 @ 07:01  -  19 @ 17:30  --------------------------------------------------------  IN: 443.7 mL / OUT: 430 mL / NET: 13.7 mL                Review of Systems:  Limited. Intubated.      PHYSICAL EXAM:  Intubated, sedated  NECK: Neck supple, No JVD  CHEST/LUNG: Bilateral decreased breath sounds, Bibasilar rales and crepitations, no wheezing  HEART: Regular rate and rhythm. KJ II/VI at LPSB, No gallop, no rub   ABDOMEN: Soft, Nontender, non distended, bowel sounds present, surgical scar with escar, Ileal conduit noted, No flank tenderness.   EXTREMITIES: Bilateral leg no edema, no cyanosis, no clubbing  Neurology: sedated  SKIN: No rashes or lesions    LABS:                                       11.5   28.75 )-----------( 431      ( 2019 06:24 )             36.9       06-16    151<H>  |  115<H>  |  38<H>  ----------------------------<  160<H>  3.8   |  16<L>  |  2.96<H>    Ca    8.4      2019 16:36  Phos  5.0     06-16  Mg     2.1     06-16    TPro  7.1  /  Alb  2.8<L>  /  TBili  0.3  /  DBili  <0.2  /  AST  15  /  ALT  6<L>  /  AlkPhos  141<H>                                          11.6   27.46 )-----------( 405      ( 15 Ramírez 2019 04:52 )             36.5       06-15    142  |  104  |  42<H>  ----------------------------<  161<H>  3.8   |  15<L>  |  2.85<H>    Ca    8.4      15 Ramírez 2019 11:43  Phos  5.2     -15  Mg     2.1         TPro  7.2  /  Alb  3.0<L>  /  TBili  0.4  /  DBili  x   /  AST  13  /  ALT  8<L>  /  AlkPhos  138<H>  06-15      PT/INR - ( 2019 18:00 )   PT: 16.8 sec;   INR: 1.47          PTT - ( 2019 18:00 )  PTT:29.1 sec    Urinalysis Basic - ( 2019 14:30 )    Color: Yellow / Appearance: Clear / S.015 / pH: x  Gluc: x / Ketone: NEGATIVE  / Bili: Negative / Urobili: 0.2 E.U./dL   Blood: x / Protein: 100 mg/dL / Nitrite: NEGATIVE   Leuk Esterase: Large / RBC: 5-10 /HPF / WBC Many /HPF   Sq Epi: x / Non Sq Epi: 0-5 /HPF / Bacteria: Present /HPF                         11.3   23.38 )-----------( 367      ( 2019 05:57 )             35.1         137  |  105  |  53<H>  ----------------------------<  154<H>  4.7   |  18<L>  |  3.20<H>    Ca    7.7<L>      2019 05:57  Phos  5.3     -14  Mg     1.6         TPro  6.6  /  Alb  2.8<L>  /  TBili  0.4  /  DBili  x   /  AST  13  /  ALT  8<L>  /  AlkPhos  115  14    Hemoglobin A1C, Whole Blood: 7.4 % <H> [4.0 - 5.6] ( @ 05:57)    PT/INR - ( 2019 18:00 )   PT: 16.8 sec;   INR: 1.47          PTT - ( 2019 18:00 )  PTT:29.1 sec  Urinalysis Basic - ( 2019 16:17 )    Color: Yellow / Appearance: Clear / S.020 / pH: x  Gluc: x / Ketone: NEGATIVE  / Bili: NEGATIVE / Urobili: 0.2 E.U./dL   Blood: x / Protein: >=300 mg/dL / Nitrite: NEGATIVE   Leuk Esterase: Moderate / RBC: > 10 /HPF / WBC Many /HPF   Sq Epi: x / Non Sq Epi: 0-5 /HPF / Bacteria: Present /HPF            RADIOLOGY & ADDITIONAL STUDIES:    < from: Xray Chest 1 View-PORTABLE IMMEDIATE (19 @ 01:58) >    EXAM:  XR CHEST PORTABLE IMMED 1V                          PROCEDURE DATE:  2019          INTERPRETATION:    XR CHEST IMMEDIATE dated 2019 1:58 AM    HISTORY: Cough    COMPARISON: Chest radiograph from 2019.    FINDINGS:   The patient is rotated to the right. Within that limitation, the   cardiomediastinal silhouette is stable. There is slightly worsening   pulmonary vascular congestion. There is new/worsened loculated right   pleural effusion. Lumbar spine hardware.      IMPRESSION:   Worsening pulmonary vascular congestion and right pleural effusion.                Thank you for the opportunity to participate in the care of this patient.    NATASHA VALADEZ M.D., RADIOLOGY RESIDENT  This document has been electronically signed.  JR BRANDON   This document has been electronically signed. 2019 10:33AM                  < end of copied text >

## 2019-06-17 NOTE — PROGRESS NOTE ADULT - SUBJECTIVE AND OBJECTIVE BOX
Patient is a 69y old  Male who presents with a chief complaint of acidemia (16 Jun 2019 12:28)      INTERVAL HPI/OVERNIGHT EVENTS: Pt seen and examined at bedside.       ICU Vital Signs Last 24 Hrs  T(C): 37.8 (17 Jun 2019 06:20), Max: 39.1 (16 Jun 2019 10:10)  T(F): 100 (17 Jun 2019 06:20), Max: 102.3 (16 Jun 2019 10:10)  HR: 66 (17 Jun 2019 07:00) (64 - 112)  BP: 91/55 (17 Jun 2019 07:00) (83/49 - 117/48)  BP(mean): 77 (17 Jun 2019 07:00) (66 - 85)  ABP: 104/56 (16 Jun 2019 15:00) (84/52 - 126/58)  ABP(mean): 72 (16 Jun 2019 15:00) (62 - 80)  RR: 17 (17 Jun 2019 07:00) (17 - 30)  SpO2: 97% (17 Jun 2019 07:00) (94% - 99%)    I&O's Summary    16 Jun 2019 07:01  -  17 Jun 2019 07:00  --------------------------------------------------------  IN: 1487.5 mL / OUT: 875 mL / NET: 612.5 mL      Mode: AC/ CMV (Assist Control/ Continuous Mandatory Ventilation)  RR (machine): 18  TV (machine): 550  FiO2: 40  PEEP: 5  ITime: 1  MAP: 9.4  PIP: 20      LABS:                        10.2   25.81 )-----------( 351      ( 17 Jun 2019 06:10 )             34.0     06-17    148<H>  |  113<H>  |  40<H>  ----------------------------<  137<H>  4.0   |  18<L>  |  2.96<H>    Ca    8.4      17 Jun 2019 00:51  Phos  5.0     06-16  Mg     2.1     06-16    TPro  7.1  /  Alb  2.8<L>  /  TBili  0.3  /  DBili  <0.2  /  AST  15  /  ALT  6<L>  /  AlkPhos  141<H>  06-16    PT/INR - ( 17 Jun 2019 06:10 )   PT: 13.7 sec;   INR: 1.21              CAPILLARY BLOOD GLUCOSE      POCT Blood Glucose.: 111 mg/dL (17 Jun 2019 05:38)  POCT Blood Glucose.: 135 mg/dL (16 Jun 2019 21:43)  POCT Blood Glucose.: 151 mg/dL (16 Jun 2019 16:34)  POCT Blood Glucose.: 129 mg/dL (16 Jun 2019 12:13)        RADIOLOGY & ADDITIONAL TESTS:    Consultant(s) Notes Reviewed:  [x ] YES  [ ] NO    MEDICATIONS  (STANDING):  ALBUTerol/ipratropium for Nebulization 3 milliLiter(s) Nebulizer every 6 hours  amiodarone    Tablet 200 milliGRAM(s) Oral daily  chlorhexidine 0.12% Liquid 15 milliLiter(s) Oral Mucosa two times a day  chlorhexidine 4% Liquid 1 Application(s) Topical <User Schedule>  dextrose 5%. 1000 milliLiter(s) (50 mL/Hr) IV Continuous <Continuous>  dextrose 5%. 1000 milliLiter(s) (60 mL/Hr) IV Continuous <Continuous>  dextrose 50% Injectable 12.5 Gram(s) IV Push once  dextrose 50% Injectable 25 Gram(s) IV Push once  dextrose 50% Injectable 25 Gram(s) IV Push once  fentaNYL   Infusion. 0.5 MICROgram(s)/kG/Hr (4.785 mL/Hr) IV Continuous <Continuous>  heparin  Injectable 7500 Unit(s) SubCutaneous every 8 hours  insulin lispro (HumaLOG) corrective regimen sliding scale   SubCutaneous Before meals and at bedtime  meropenem  IVPB 1000 milliGRAM(s) IV Intermittent every 12 hours  methadone    Tablet 5 milliGRAM(s) Oral daily  norepinephrine Infusion 0.05 MICROgram(s)/kG/Min (8.972 mL/Hr) IV Continuous <Continuous>  pantoprazole  Injectable 40 milliGRAM(s) IV Push daily  polyethylene glycol 3350 17 Gram(s) Oral daily  propofol Infusion 5 MICROgram(s)/kG/Min (2.871 mL/Hr) IV Continuous <Continuous>  senna 1 Tablet(s) Oral daily  sodium bicarbonate  Infusion 0.125 mEq/kG/Hr (80 mL/Hr) IV Continuous <Continuous>    MEDICATIONS  (PRN):  dextrose 40% Gel 15 Gram(s) Oral once PRN Blood Glucose LESS THAN 70 milliGRAM(s)/deciliter  glucagon  Injectable 1 milliGRAM(s) IntraMuscular once PRN Glucose LESS THAN 70 milligrams/deciliter  HYDROmorphone  Injectable 0.5 milliGRAM(s) IV Push every 4 hours PRN Severe Pain (7 - 10)      PHYSICAL EXAM:  GENERAL: well built, well nourished  HEAD:  Atraumatic, Normocephalic  EYES: EOMI, PERRLA, conjunctiva and sclera clear  ENT: No tonsillar erythema, exudates, or enlargement; Moist mucous membranes, Good dentition, No lesions  NECK: Supple, No JVD, Normal thyroid, no enlarged nodes  NERVOUS SYSTEM:  Alert & Oriented X3, Good concentration; Motor Strength 5/5 B/L upper and lower extremities; DTRs 2+ intact and symmetric, sensory intact  CHEST/LUNG: B/L good air entry; No rales, rhonchi, or wheezing  HEART: S1S2 normal, no S3, Regular rate and rhythm; No murmurs, rubs, or gallops  ABDOMEN: Soft, Nontender, Nondistended; Bowel sounds present  EXTREMITIES:  2+ Peripheral Pulses, No clubbing, cyanosis, or edema  LYMPH: No lymphadenopathy noted  SKIN: No rashes or lesions Patient is a 69y old  Male who presents with a chief complaint of acidemia (16 Jun 2019 12:28)      INTERVAL HPI/OVERNIGHT EVENTS: Pt seen and examined at bedside. Intubated and sedated.      ICU Vital Signs Last 24 Hrs  T(C): 37.8 (17 Jun 2019 06:20), Max: 39.1 (16 Jun 2019 10:10)  T(F): 100 (17 Jun 2019 06:20), Max: 102.3 (16 Jun 2019 10:10)  HR: 66 (17 Jun 2019 07:00) (64 - 112)  BP: 91/55 (17 Jun 2019 07:00) (83/49 - 117/48)  BP(mean): 77 (17 Jun 2019 07:00) (66 - 85)  ABP: 104/56 (16 Jun 2019 15:00) (84/52 - 126/58)  ABP(mean): 72 (16 Jun 2019 15:00) (62 - 80)  RR: 17 (17 Jun 2019 07:00) (17 - 30)  SpO2: 97% (17 Jun 2019 07:00) (94% - 99%)    I&O's Summary    16 Jun 2019 07:01  -  17 Jun 2019 07:00  --------------------------------------------------------  IN: 1487.5 mL / OUT: 875 mL / NET: 612.5 mL      Mode: AC/ CMV (Assist Control/ Continuous Mandatory Ventilation)  RR (machine): 18  TV (machine): 550  FiO2: 40  PEEP: 5  ITime: 1  MAP: 9.4  PIP: 20      LABS:                        10.2   25.81 )-----------( 351      ( 17 Jun 2019 06:10 )             34.0     06-17    148<H>  |  113<H>  |  40<H>  ----------------------------<  137<H>  4.0   |  18<L>  |  2.96<H>    Ca    8.4      17 Jun 2019 00:51  Phos  5.0     06-16  Mg     2.1     06-16    TPro  7.1  /  Alb  2.8<L>  /  TBili  0.3  /  DBili  <0.2  /  AST  15  /  ALT  6<L>  /  AlkPhos  141<H>  06-16    PT/INR - ( 17 Jun 2019 06:10 )   PT: 13.7 sec;   INR: 1.21              CAPILLARY BLOOD GLUCOSE      POCT Blood Glucose.: 111 mg/dL (17 Jun 2019 05:38)  POCT Blood Glucose.: 135 mg/dL (16 Jun 2019 21:43)  POCT Blood Glucose.: 151 mg/dL (16 Jun 2019 16:34)  POCT Blood Glucose.: 129 mg/dL (16 Jun 2019 12:13)        RADIOLOGY & ADDITIONAL TESTS:    Consultant(s) Notes Reviewed:  [x ] YES  [ ] NO    MEDICATIONS  (STANDING):  ALBUTerol/ipratropium for Nebulization 3 milliLiter(s) Nebulizer every 6 hours  amiodarone    Tablet 200 milliGRAM(s) Oral daily  chlorhexidine 0.12% Liquid 15 milliLiter(s) Oral Mucosa two times a day  chlorhexidine 4% Liquid 1 Application(s) Topical <User Schedule>  dextrose 5%. 1000 milliLiter(s) (50 mL/Hr) IV Continuous <Continuous>  dextrose 5%. 1000 milliLiter(s) (60 mL/Hr) IV Continuous <Continuous>  dextrose 50% Injectable 12.5 Gram(s) IV Push once  dextrose 50% Injectable 25 Gram(s) IV Push once  dextrose 50% Injectable 25 Gram(s) IV Push once  fentaNYL   Infusion. 0.5 MICROgram(s)/kG/Hr (4.785 mL/Hr) IV Continuous <Continuous>  heparin  Injectable 7500 Unit(s) SubCutaneous every 8 hours  insulin lispro (HumaLOG) corrective regimen sliding scale   SubCutaneous Before meals and at bedtime  meropenem  IVPB 1000 milliGRAM(s) IV Intermittent every 12 hours  methadone    Tablet 5 milliGRAM(s) Oral daily  norepinephrine Infusion 0.05 MICROgram(s)/kG/Min (8.972 mL/Hr) IV Continuous <Continuous>  pantoprazole  Injectable 40 milliGRAM(s) IV Push daily  polyethylene glycol 3350 17 Gram(s) Oral daily  propofol Infusion 5 MICROgram(s)/kG/Min (2.871 mL/Hr) IV Continuous <Continuous>  senna 1 Tablet(s) Oral daily  sodium bicarbonate  Infusion 0.125 mEq/kG/Hr (80 mL/Hr) IV Continuous <Continuous>    MEDICATIONS  (PRN):  dextrose 40% Gel 15 Gram(s) Oral once PRN Blood Glucose LESS THAN 70 milliGRAM(s)/deciliter  glucagon  Injectable 1 milliGRAM(s) IntraMuscular once PRN Glucose LESS THAN 70 milligrams/deciliter  HYDROmorphone  Injectable 0.5 milliGRAM(s) IV Push every 4 hours PRN Severe Pain (7 - 10)        PE:    Constitutional: intubated and sedated  	Head: NC/AT  	Eyes: PERRL, EOMI, clear conjunctiva  	ENT: no nasal discharge; uvula midline, no oropharyngeal erythema or exudates; ET tube in place  	Neck: supple; no JVD or thyromegaly  	Respiratory: diffuse rhonchi  	Cardiac: +S1/S2; RRR; no M/R/G;   	Gastrointestinal: distended abd with hard bulges, mildly TTP w/o rebound or guarding, nephrostomy bag in place in abdomen, dark red/brown large scab/ wound over central abdomen   	Extremities:  scaling grey skin in LEs, no edema  	Musculoskeletal: NROM x4; no joint swelling, tenderness or erythema     pscyh: agitated when awake, currently sedated

## 2019-06-17 NOTE — CHART NOTE - NSCHARTNOTEFT_GEN_A_CORE
MTB PRE-BRONCHOSCOPY RISK ASSESSMENT  ------------------------------------------------------------    Procedure Date:     Provider Name:     Reason for Bronchoscopy:    Location of Procedure (check one):   [  ] Endoscopy  [  ] Emergency Department  [ X ] Intensive Care Unit  [  ] Operating Room  [  ] Other: _________    RISK ASSESSMENT  I. Patient symptoms (check all that apply): >/ 3 of these = SIGNIFICANT RISK for TB  [  ] Coughing > 2 to 3 weeks                 [  ] Unexplained fever >/ 2 weeks   [  ] Unusual weakness or fatigue  [  ] Unexplained weight loss > 10lbs.  [  ] Hemoptysis                                   [  ] Unusual or night sweating  [ X ] NON-APPLICABLE    II. TB history (Check all that apply): >/ 1 of these = SIGNIFICANT RISK for TB  [  ] Sputum smear/culture (+) for acid fast bacilli (AFB)                           [  ] Abnormal CXR or CT suggestive of TB; date(s) & description __________  [  ] Positive TB skin or blood test; date: __________                               [  ] On medication for latent TB or disease; list medication(s) ____________  [  ] TB diagnosed in the past; year treated: _______                                [  ] Inadequately treated TB  [  ] Current close contact of a person known or suspected to have TB  [ X ] NON-APPLICABLE    III. Additional Risk factors for TB (Check all that apply): Consider these in relation to symptoms and history  [  ] Person has conditions placing them at higher risk for TB disease (i.e. immunosuppressive therapy)  [  ] Person has lived in a country for 3 months or more where TB is common  [  ] Person lives in a high risk environment for TB (i.e. long term care, health care worker, incarcerated, homeless)  [  ] Person injects illicit drugs  [  ] Person is HIV positive or at high risk for HIV    ****************************************************************  BASED ON THE TB RISK ASSESSMENT ABOVE, THE PATIENT'S RISK FOR TB IS:                       [ X ] LOW RISK FOR TB            [  ] SIGNIFICANT RISK FOR TB  ****************************************************************    IV. Based on the Determined Risk for TB, the following Action(s) are Recommended:  [ X ] Low risk for TB infection --> Proceed with the diagnostic procedure    [  ] Significant risk for TB infection:  1. Perform the procedure in a negative pressure room, with appropriate personal protective equipment (PPE) for healthcare personnel (i.e. N95 respirator)    2. If it is not feasible to move the patient or defer the procedure:    a. Use a single-bedded room in a low traffic area to perform the bronchoscopy procedure    b. Place a portable high-efficiency particulate air (HEPA) filter in the space prior to starting the procedure and keep the door closed. Refer to Infection Control policy titled "Tuberculosis Control Strategy Plan" for additional information.    c. All healthcare personnel in the procedure room shall wear and N95 disposible respirator.    3. Documentation of the tuberculosis risk assessment is to be included in the patient's medical record.

## 2019-06-17 NOTE — PROGRESS NOTE ADULT - ASSESSMENT
68 y/o morbidly obese M with h/o remote prostate ca s/p RT, bowel obstruction, right cystectomy with ileal conduit, HTN, spine surgery, chronic pain syndrome on methadone, CASEY, and progressive cognitive deficits per partner, p/w SOB, found to be in septic shock from pyleonephritis vs. cholecystitis vs infected abd wound vs. pna and abnormal radiograph suggesting metastatic lung cancer, seen for goals of care

## 2019-06-17 NOTE — PROGRESS NOTE ADULT - SUBJECTIVE AND OBJECTIVE BOX
JANIE ZABALA   MRN-[id number]         CC: Patient is a 69y old  Male who presents with a chief complaint of acidemia (17 Jun 2019 11:18)    HPI:  70 y/o M PMH HTN, smoker, chronic back pain, s/p radical cystectomy w/ one remaining kidney w/ nephrostomy /w SOB x 3 days. Pt denies fever, cough, CP. Pt in pain in R back. No belly pain. Could not provide hx of when had procedures or smoking hx.    In the ED, pt afebrile, HR 72, satting 100% on 3L NC, RR 22-->18, BP 90/60-->120s. Labs significant for WBC 30, K 5.7, bicarb 11, AG 20, VBG pH 7.12-->7.08, CO2 40, lactate 2.6, BUN ~70, Cr ~4, unknown baseline. Carboxyhemoglobin slightly elevated at 2.6. CXR clear. CT chest abd concerning for metastatic lung cancer, and possible cholecystitis and nephritis w/ hydro. S/p vanc, cefepime, ketamine, bicarb drip 150/hr, insulin/ d50, LR 1 L bolus. Transferred to [facility] MICU for emergent HD and further care. On arrival, renal and urology consulted. Pt putting out urine into nephrostomy tube and abg improved so no urgent HD, but renal will monitor and start HD if needed. Pt started on vanc/ zosyn for pyelonephritis and broad coverage while r/o pna and cholecystitis, or infected surgical wound. (13 Jun 2019 15:29)    SUBJECTIVE:  ROS:  UNABLE TO OBTAIN  due to:    DYSPNEA (Y/N):	  N/V (Y/N):	  SECRETIONS (Y/N):	  AGITATION (Y/N):  PAIN(Y/N):        -Provocation/Palliation:     -Quality/Quantity:     -Radiating:     -Severity:     -Timing/Frequency:     -Impact on ADLs:  GENERAL:    HEENT:      	  NECK:           CVS:           	  RESP:        	  GI:             	  :            	  MUSC:       	  NEURO:     	  PSYCH:        PHYSICAL EXAM:  T(C): 37.9 (06-17-19 @ 10:00), Max: 38.3 (06-16-19 @ 13:00)  T(F): 100.2 (06-17-19 @ 10:00), Max: 101 (06-16-19 @ 13:00)  HR: 70 (06-17-19 @ 11:00) (64 - 87)  BP: 102/61 (06-17-19 @ 11:00) (91/51 - 120/60)  RR: 17 (06-17-19 @ 11:00) (17 - 19)  SpO2: 94% (06-17-19 @ 11:00) (94% - 99%)  Wt(kg): --  GENERAL:    HEENT:      	  NECK:           CVS:           	  RESP:        	  GI:             	  :            	  MUSC:       	  NEURO:     	  PSYCH:          SKIN:         	   LYMPH:         ALLERGIES:  No Known Allergies      OPIATE NAÏVE (Y/N):  -iStop reviewed (Y/N): Y    MEDICATIONS: reviewed  MEDICATIONS  (STANDING):  ALBUTerol/ipratropium for Nebulization 3 milliLiter(s) Nebulizer every 6 hours  amiodarone    Tablet 200 milliGRAM(s) Oral daily  chlorhexidine 0.12% Liquid 15 milliLiter(s) Oral Mucosa two times a day  chlorhexidine 4% Liquid 1 Application(s) Topical <User Schedule>  dextrose 5%. 1000 milliLiter(s) (50 mL/Hr) IV Continuous <Continuous>  dextrose 50% Injectable 12.5 Gram(s) IV Push once  dextrose 50% Injectable 25 Gram(s) IV Push once  dextrose 50% Injectable 25 Gram(s) IV Push once  fentaNYL   Infusion. 0.5 MICROgram(s)/kG/Hr (4.785 mL/Hr) IV Continuous <Continuous>  heparin  Injectable 7500 Unit(s) SubCutaneous every 8 hours  insulin lispro (HumaLOG) corrective regimen sliding scale   SubCutaneous Before meals and at bedtime  meropenem  IVPB 1000 milliGRAM(s) IV Intermittent every 12 hours  methadone    Tablet 5 milliGRAM(s) Oral daily  norepinephrine Infusion 0.05 MICROgram(s)/kG/Min (8.972 mL/Hr) IV Continuous <Continuous>  pantoprazole  Injectable 40 milliGRAM(s) IV Push daily  polyethylene glycol 3350 17 Gram(s) Oral daily  propofol Infusion 5 MICROgram(s)/kG/Min (2.871 mL/Hr) IV Continuous <Continuous>  senna 1 Tablet(s) Oral daily    MEDICATIONS  (PRN):  dextrose 40% Gel 15 Gram(s) Oral once PRN Blood Glucose LESS THAN 70 milliGRAM(s)/deciliter  glucagon  Injectable 1 milliGRAM(s) IntraMuscular once PRN Glucose LESS THAN 70 milligrams/deciliter  HYDROmorphone  Injectable 0.5 milliGRAM(s) IV Push every 4 hours PRN Severe Pain (7 - 10)      LABS: reviewed                        10.2   25.81 )-----------( 351      ( 17 Jun 2019 06:10 )             34.0     06-17    149<H>  |  115<H>  |  39<H>  ----------------------------<  111<H>  3.9   |  18<L>  |  2.90<H>    Ca    8.4      17 Jun 2019 06:10  Phos  4.1     06-17  Mg     2.2     06-17    TPro  7.1  /  Alb  2.8<L>  /  TBili  0.3  /  DBili  <0.2  /  AST  15  /  ALT  6<L>  /  AlkPhos  141<H>  06-16    LIVER FUNCTIONS - ( 16 Jun 2019 06:24 )  Alb: 2.8 g/dL / Pro: 7.1 g/dL / ALK PHOS: 141 U/L / ALT: 6 U/L / AST: 15 U/L / GGT: x           PT/INR - ( 17 Jun 2019 06:10 )   PT: 13.7 sec;   INR: 1.21              IMAGING: reviewed    ADVANCE DIRECTIVES:  DNR  Decision maker:  Legal surrogate:    PSYCHOSOCIAL-SPIRITUAL ASSESSMENT:  Reviewed      GOALS OF CARE DISCUSSION:  Palliative care info/counseling provided	      See previous Palliative Medicine Note  Documentation of GOC:     AGENCY CHOICE DISCUSSED:   none          REFERRALS:	   Palliative Med   Unit SW/Case Mgmt  Nutrition  PT/OT    [ ]CRITICAL CARE TIME PROVIDED TO UNSTABLE PT W/ ORGAN FAILURE            [x]> 50% OF THE TIME SPENT IN COUNSELING AND COORDINATING CARE     [ ]PROLONGED SERVICE       FACE TO FACE: [x]PT     [ ]PT & FAMILY    Start:               End:  	       Minutes: JANIE ZABALA   [id number]         CC: Patient is a 69y old  Male who presents with a chief complaint of acidemia (17 Jun 2019 11:18)    HPI:  68 y/o M PMH HTN, smoker, chronic back pain, s/p radical cystectomy w/ one remaining kidney w/ nephrostomy /w SOB x 3 days. Pt denies fever, cough, CP. Pt in pain in R back. No belly pain. Could not provide hx of when had procedures or smoking hx.    In the ED, pt afebrile, HR 72, satting 100% on 3L NC, RR 22-->18, BP 90/60-->120s. Labs significant for WBC 30, K 5.7, bicarb 11, AG 20, VBG pH 7.12-->7.08, CO2 40, lactate 2.6, BUN ~70, Cr ~4, unknown baseline. Carboxyhemoglobin slightly elevated at 2.6. CXR clear. CT chest abd concerning for metastatic lung cancer, and possible cholecystitis and nephritis w/ hydro. S/p vanc, cefepime, ketamine, bicarb drip 150/hr, insulin/ d50, LR 1 L bolus. Transferred to [facility] MICU for emergent HD and further care. On arrival, renal and urology consulted. Pt putting out urine into nephrostomy tube and abg improved so no urgent HD, but renal will monitor and start HD if needed. Pt started on vanc/ zosyn for pyelonephritis and broad coverage while r/o pna and cholecystitis, or infected surgical wound. (13 Jun 2019 15:29)    SUBJECTIVE: s/p bronch just moments ago.  Pt's Living Will from 1994 in chart noted  ROS:  UNABLE TO OBTAIN  due to: sedated on vent    DYSPNEA (Y/N):	  N/V (Y/N):	  SECRETIONS (Y/N):	  AGITATION (Y/N):  PAIN(Y/N):        -Provocation/Palliation:     -Quality/Quantity:     -Radiating:     -Severity:     -Timing/Frequency:     -Impact on ADLs:       PHYSICAL EXAM:  T(C): 37.9 (06-17-19 @ 10:00), Max: 38.3 (06-16-19 @ 13:00)  T(F): 100.2 (06-17-19 @ 10:00), Max: 101 (06-16-19 @ 13:00)  HR: 70 (06-17-19 @ 11:00) (64 - 87)  BP: 102/61 (06-17-19 @ 11:00) (91/51 - 120/60)  RR: 17 (06-17-19 @ 11:00) (17 - 19)  SpO2: 94% (06-17-19 @ 11:00) (94% - 99%)    GENERAL: Morbidly obese sick looking gentleman in ICU    HEENT: normcephalic, no spont. eye opening, cannot assess hearing adequately      	        CVS: SR on ECG          	  RESP: ETT to CMV on vent       	  GI: NGT           	  : ileal conduit           	  MUSC: no spont. mov't to extremiteis noted     	  NEURO: sedated on diprivan and fentanyl    	  PSYCH: sedated on diprivan and fentanyl          ALLERGIES:  No Known Allergies    OPIATE NAÏVE (Y/N): n  -iStop reviewed (Y/N): Y, on initial consult    MEDICATIONS: reviewed  MEDICATIONS  (STANDING):  ALBUTerol/ipratropium for Nebulization 3 milliLiter(s) Nebulizer every 6 hours  amiodarone    Tablet 200 milliGRAM(s) Oral daily  chlorhexidine 0.12% Liquid 15 milliLiter(s) Oral Mucosa two times a day  chlorhexidine 4% Liquid 1 Application(s) Topical <User Schedule>  dextrose 5%. 1000 milliLiter(s) (50 mL/Hr) IV Continuous <Continuous>  dextrose 50% Injectable 12.5 Gram(s) IV Push once  dextrose 50% Injectable 25 Gram(s) IV Push once  dextrose 50% Injectable 25 Gram(s) IV Push once  fentaNYL   Infusion. 0.5 MICROgram(s)/kG/Hr (4.785 mL/Hr) IV Continuous <Continuous>  heparin  Injectable 7500 Unit(s) SubCutaneous every 8 hours  insulin lispro (HumaLOG) corrective regimen sliding scale   SubCutaneous Before meals and at bedtime  meropenem  IVPB 1000 milliGRAM(s) IV Intermittent every 12 hours  methadone    Tablet 5 milliGRAM(s) Oral daily  norepinephrine Infusion 0.05 MICROgram(s)/kG/Min (8.972 mL/Hr) IV Continuous <Continuous>  pantoprazole  Injectable 40 milliGRAM(s) IV Push daily  polyethylene glycol 3350 17 Gram(s) Oral daily  propofol Infusion 5 MICROgram(s)/kG/Min (2.871 mL/Hr) IV Continuous <Continuous>  senna 1 Tablet(s) Oral daily    MEDICATIONS  (PRN):  dextrose 40% Gel 15 Gram(s) Oral once PRN Blood Glucose LESS THAN 70 milliGRAM(s)/deciliter  glucagon  Injectable 1 milliGRAM(s) IntraMuscular once PRN Glucose LESS THAN 70 milligrams/deciliter  HYDROmorphone  Injectable 0.5 milliGRAM(s) IV Push every 4 hours PRN Severe Pain (7 - 10)    LABS: reviewed                        10.2   25.81 )-----------( 351      ( 17 Jun 2019 06:10 )             34.0     06-17    149<H>  |  115<H>  |  39<H>  ----------------------------<  111<H>  3.9   |  18<L>  |  2.90<H>    Ca    8.4      17 Jun 2019 06:10  Phos  4.1     06-17  Mg     2.2     06-17    TPro  7.1  /  Alb  2.8<L>  /  TBili  0.3  /  DBili  <0.2  /  AST  15  /  ALT  6<L>  /  AlkPhos  141<H>  06-16    LIVER FUNCTIONS - ( 16 Jun 2019 06:24 )  Alb: 2.8 g/dL / Pro: 7.1 g/dL / ALK PHOS: 141 U/L / ALT: 6 U/L / AST: 15 U/L / GGT: x           PT/INR - ( 17 Jun 2019 06:10 )   PT: 13.7 sec;   INR: 1.21       IMAGING: reviewed  < from: Xray Chest 1 View- PORTABLE-Routine (06.17.19 @ 05:55) >  EXAM:  XR CHEST PORTABLE ROUTINE 1V                        PROCEDURE DATE:  06/17/2019    < from: Xray Chest 1 View- PORTABLE-Routine (06.17.19 @ 05:55) >  impression: Stable positioning of support devices. Right opacity/pleural   effusion, increased.. Left basilar opacity, increased.. Stable heart   size. Cervical spine hardware.  < end of copied text >    ADVANCE DIRECTIVES:  DNR  Decision maker: pt without capacity at this time to make med decisions, primary HCP is Vinod Bo [phone]  Legal surrogate: primary HCP is Vinod Betzy [phone], alt is Sister Vaughn Harper, followed by friend Candelaria Antonio    PSYCHOSOCIAL-SPIRITUAL ASSESSMENT:  Reviewed    GOALS OF CARE DISCUSSION:  Palliative care info/counseling provided	      See previous Palliative Medicine Note  Documentation of GOC: cont. current treatment plan    AGENCY CHOICE DISCUSSED:   none          REFERRALS:	   Palliative Med   Unit SW/Case Mgmt  Nutrition  PT/OT    [ ]CRITICAL CARE TIME PROVIDED TO UNSTABLE PT W/ ORGAN FAILURE            [x]> 50% OF THE TIME SPENT IN COUNSELING AND COORDINATING CARE     [ ]PROLONGED SERVICE       FACE TO FACE: [x]PT     [ ]PT & FAMILY    Start:               End:  	       Minutes:

## 2019-06-17 NOTE — PROGRESS NOTE ADULT - ATTENDING COMMENTS
rising creat with hypernatremia - possibly on dry side  suggest D5W again  can check CVP and urien lytes to help assess volume status

## 2019-06-17 NOTE — BRIEF OPERATIVE NOTE - OPERATION/FINDINGS
Sharp fariba. Airways were examined to the subsegmental level bilaterally. Left sided airways appeared normal

## 2019-06-17 NOTE — PROGRESS NOTE ADULT - ASSESSMENT
Non oliguric HUGO on un unknown prior baseline renal function now with stable S cr to 2.9 at present   DX: prerenal in setting of hypotension and with cr improving with IVF  vs intrinsic (ATN ( given concern for sepsis) , AIN ) and post renal  CT scan--> r renal atrophy with moderate hydronephrosis  Urology team following for ileal conduit and urostomy and left moderate hydronephrosis  monitor urine output via urostomy bag ( Last 24 hours 950cc) with net positive fluid balance ot 550cc  Non oliguric with stable renal function  Maintain MAP>65-70 mmhg on IV pressors.  Adjust antibiotics as per renal dose clearance  Avoid nephrotoxic agents  Volume status probably intravascularly dry.   Chest xray with Right lung mass with small effusion per pulmonary team.  Will consider work up for proteinuria/microscopic hematuria after discussion with family/primary team.  Called PCP Dr. Courtney to obtain baseline S creatinine. No response. Left message [phone].  Monitor BMP every 12 hrly  No urgent need for RRT/HD at present.    Hypernatremia likely intravascular volume depletion with free water deficit 3.1L  -Monitor BMP   - Increase free water through NG tube to 250 q 4 hrly for now  - Off IV fluids for now    Anemia of chronic renal disease with stable Hgb 10.2  - Obtain iron studies  - Check SPEP, Immunofixation, serum free light chain ratio  - Monitor hgb   - No EPO    # Renal bone disease  Calcium 8.4 with albumin 2.8 and Phos 4.1  Intact PTH, VItamin D 25, VItamin D 1,25 level reviewed.  Monitor calcium and phos level    #Acute respiratory failure with lung mass:  Continue ventilatory support  Plan per primary ICU team/family.

## 2019-06-17 NOTE — PROGRESS NOTE ADULT - SUBJECTIVE AND OBJECTIVE BOX
Patient is a 69y Male seen and evaluated at bedside. Patient remains critically ill on ventilatory support on IV pressors with hypotension. S cr remain stable 2.9 over past few days with mild hypernatremia with Na level 149 at present.     ALBUTerol/ipratropium for Nebulization 3 every 6 hours  amiodarone    Tablet 200 daily  chlorhexidine 0.12% Liquid 15 two times a day  chlorhexidine 4% Liquid 1 <User Schedule>  dextrose 40% Gel 15 once PRN  dextrose 5%. 1000 <Continuous>  dextrose 50% Injectable 12.5 once  dextrose 50% Injectable 25 once  dextrose 50% Injectable 25 once  fentaNYL   Infusion. 0.5 <Continuous>  glucagon  Injectable 1 once PRN  heparin  Injectable 7500 every 8 hours  HYDROmorphone  Injectable 0.5 every 4 hours PRN  insulin lispro (HumaLOG) corrective regimen sliding scale  Before meals and at bedtime  meropenem  IVPB 1000 every 12 hours  methadone    Tablet 5 daily  norepinephrine Infusion 0.05 <Continuous>  pantoprazole  Injectable 40 daily  polyethylene glycol 3350 17 daily  propofol Infusion 5 <Continuous>  senna 1 daily      Allergies    No Known Allergies    Intolerances        T(C): , Max: 38.3 (06-16-19 @ 13:00)  T(F): , Max: 101 (06-16-19 @ 13:00)  HR: 70 (06-17-19 @ 11:00)  BP: 102/61 (06-17-19 @ 11:00)  BP(mean): 75 (06-17-19 @ 11:00)  RR: 17 (06-17-19 @ 11:00)  SpO2: 94% (06-17-19 @ 11:00)  Wt(kg): --    06-16 @ 07:01  -  06-17 @ 07:00  --------------------------------------------------------  IN: 1487.5 mL / OUT: 940 mL / NET: 547.5 mL    06-17 @ 07:01  -  06-17 @ 11:19  --------------------------------------------------------  IN: 107.3 mL / OUT: 55 mL / NET: 52.3 mL        Review of Systems:  Limited. Patient sedated and intubated. unable to follow verbal commands      PHYSICAL EXAM:  GENERAL: Ill appearing, lying in bed, sedated and intubated on ventilatory support  HEAD:  Atraumatic, Normocephalic,   EYES: Bilateral conjunctival and scleral pallor   Oral cavity: Oral mucosa dry and pale  NECK: Neck supple, No JVD  CHEST/LUNG: Bilateral decreased breath sounds, Right>left, Bibasilar rhonchi, no wheezing  HEART: Regular rate and rhythm. KJ II/VI at LPSB, No gallop, no rub   ABDOMEN: Soft, nontender, non distended, bowel sounds present, Ileal conduit noted  EXTREMITIES: Bilateral leg no edema, no cyanosis, no clubbing  Neurology: AAOx0, sedated and intubated on ventilatory support.  SKIN: No rashes or lesions                          10.2   25.81 )-----------( 351      ( 17 Jun 2019 06:10 )             34.0     06-17    149<H>  |  115<H>  |  39<H>  ----------------------------<  111<H>  3.9   |  18<L>  |  2.90<H>    Ca    8.4      17 Jun 2019 06:10  Phos  4.1     06-17  Mg     2.2     06-17    TPro  7.1  /  Alb  2.8<L>  /  TBili  0.3  /  DBili  <0.2  /  AST  15  /  ALT  6<L>  /  AlkPhos  141<H>  06-16      PT/INR - ( 17 Jun 2019 06:10 )   PT: 13.7 sec;   INR: 1.21                    RADIOLOGY & ADDITIONAL STUDIES:  < from: CT Abdomen and Pelvis No Cont (06.16.19 @ 17:12) >    EXAM:  CT CHEST                          EXAM:  CT ABDOMEN AND PELVIS                          PROCEDURE DATE:  06/16/2019          INTERPRETATION:  CLINICAL INDICATION: 69-year-old with shortness of   breath and septic shock.        FINDINGS: CT of the chest, abdomen and pelvis was performed without the   administration of intravenous contrast. Reconstructions were formed in   the sagittal and coronal planes. Comparison is made to prior study dated   6/13/2019.    Evaluation of the chest demonstrates mild heterogeneity of the thyroid   gland parenchyma. Central venous catheter with the tip in the SVC. Normal   heart size. Interval enlargement of moderate right parapneumonic effusion   with new trace left pleural effusion. Negative for thoracic inlet or   axillary adenopathy. Mild mediastinal adenopathy with dominant lymph   nodes measuring up to 15 mm in the pretracheal region. There is severe   coronary arterial calcification. Heavy mitral annular calcification.   Minimal calcification of aortic valve, which can correlate with degree of   aortic stenosis. There is bilateral gynecomastia. Evaluation of the lung   parenchyma demonstrates progressive complete masslike consolidation of   the right lower lobe. Multiple calcified granulomata within the right   lower lobe. No interval change in multiple small 4 to 6 mm pulmonary   nodules scattered throughout the left lung, largest located within the   left upper lobe/left apex medially measuring 6 mm. There is no interval   change in paraseptal emphysematous change. No endobronchial lesion.   Endotracheal tube above the level of the fariba.    Evaluation of the abdomen demonstrates no interval change in marked   distention of the gallbladder. Layering sludge within the gallbladder. No   interval change in multiple nodules within the liver, largest located   within the anterior segment of the right lobe measuring 2.2 cm and also   within the inferior tip of the liver measuring 2.8 cm. The unenhanced   spleen and pancreas are unremarkable. No interval change in bilateral   adrenal masses measuring 4.6 cm on the right and 2.5 cm on the left.   Bilateral nephrolithiasis. Cortical atrophy of the right kidney with   persistent moderate right hydroureteronephrosis with right lower quadrant   ileal conduit; there is persistent thickening and edematous infiltration   surrounding the proximal right ureter. Evaluation of the gastrointestinal   tract demonstrates NG tube tip within the stomach. Small duodenal lipoma.   No bowel thickening. No bowel obstruction. Right lower quadrant   ileostomy. Nonobstructing parastomal hernia containing a moderate segment   of transverse colon within the right lower abdominal wall. Evaluation of   the pelvis demonstrates radical cystectomy.There are small bilateral   fat-containing inguinal hernias. There are increased number of   nonenlarged retroperitoneal lymph nodes measuring up to 8 mm. There is   severe aortic and vascular calcification, most severe of the external   iliac arteries. Evaluation of the osseous structures and straits   unchanged sclerotic regions within adjacent mid thoracic vertebral   bodies. Partially visualized anterior cervical fusion hardware. Posterior   lumbar fusion hardware.        IMPRESSION:    Progressive complete masslike consolidation of the right lower lobe with   enlarging moderate right parapneumonic effusion; new trace left pleural   effusion.    No interval change in hepatic, adrenal and possible osseous metastasis.    Right lower quadrant ileostomy; persistent moderate right   hydroureteronephrosis with possible persistent infectious/inflammatory   ureteritis.    Thank you for the opportunity to participate in the care of this patient.    NATASHA VALADEZ M.D., RADIOLOGY RESIDENT  This document has been electronically signed.  PRISCILLA COTTON M.D., ATTENDING RADIOLOGIST  This document has been electronically signed. Jun 17 2019  8:24AM           end of copied text >

## 2019-06-17 NOTE — PROGRESS NOTE ADULT - ASSESSMENT
70 y/o M PMH smoker (50 years and current), sleep apnea w/o cpap, short-term mem loss, HTN, DM2, chronic back pain s/p multiple back surgeries w/ hardware on methadone, anxiety, SBO s/p sx c/b wound that never healed, prostate cancer 1999 s/p radiation c/b radical cystectomy and nephrostomy w/ ileal conduit (in remission), p/w SOB, found to have severe acidemia, sepsis 2/2 pyelonephritis vs PNA, new onset suspected lung cancer w/ mets, admitted to MICU.    Neurology  #agitation/ anxiety hx/ short term mem loss hx  -sedation and intubation until clinically improves  -c/w home sertraline when not sedated    #AMS  likely 2/2 uremia vs sepsis vs baseline per partner when has short term mem loss and gets confused and agitated  -intubated and sedated  -tx underlying cause as below    Cardiac  #mild aortic aneurysm  dilation of the aortic root and ascending aorta on ct, no known hx  -control sBP <150   -f/u echo    #hypotension  likely 2/2 sedation and sepsis  -c/w levophed for now, titrate as BP tolerates    #htn  hx of htn  -currently on pressors - holding home medications    Pulmonary  #lung cancer w/ mets  CT chest/ abd w/o contrast due to cr 4.5 showing 7cm RLL mass concerning for neoplasm w/ suspected hepatic and adrenal mets and abd LAD on ct  -obtain collateral about hx of cancer/ treatment from partner or pmd  -outpt f/u  -pal care consult  -venous dopplers negative  -consider bronch monday for potential BAL for cytology    #smoker   50 years and current, no recent ct scans  -nicotine patch prn when pt not sedated    #sleep apnea  offered cpap but did not use  -currently intubated    #COPD  no dx, no inhalers, emphysema on CT scan of chest, suspect resp acidosis 2/2 COPD that is chronic and preventing compensation of met acidosis  -intubation to decrease WOB and increase RR if needed to compensate for metabolic acidosis  -duonebs q6 standing     #r/o PNA  CT chest cannot r/o PNA, cxr shows no obvious infiltrate but pt SOB w/ tachypnea and WBC 30, no hypoxia, no fevers, s/p vanc/ cefepime in ED--> vanc and zosyn  -sputum cx f/u  -daily cxr  -vanc zosyn and tito as below  -f/u bcx- ngtd    Renal  #uremia  bun ~70, unclear baseline, renal consulted--> improving 53  -f/u renal recs  -q12 bmp  -HD in near future?  -monitor UO - goal 45cc/hr if not meeting goal than will tell renal need more urgent HD    #HUGO vs CKD  Cr 4.5, unknown baseline, improving to ~3  -renally dose medications  -as above    Metabolic  #AG metabolic acidosis uncompnesated  likely 2/2 uremia, pH vbg 7.12-->7.08-->improved to 7.37 after bicarb drip  and IVF LR (turned off bc fluid overloaded) but then decreased again after bicarb gtt off  -monitor abg,   -renal consult as above  -intubated as above for resp compensation (high vt and rr)  -sodium bicarb 650 bid    #hypernatremia  was on sodium bicarb 1300 tid, now decreased dose, and changed drips and meds fluids from ns to d5    ID  # sepsis 2/2 pyelonephritis vs PNA  wbc 30--> 23, w/ tachypnea to 22 meets 2/4 sirs crtiria, afebrile, lactate 2.6. cxr clear, ct chest cannot r/o consolidation in RLL under mass. ct scan showing perinephritic stranding and mod hydronephrosis concerning for UTI/ nephritis UA+, also shows distended gallbladder w/ stones concerning for cholecystitis, urine cx and bcx ngtd  -sputum cx f/u  -monitor wbc  -RUQ US w hepatomegaly, 2 solid liver lesions, markedly distended gallbladder w sludge and gallstones; moderate R hydronephrosis  -vanc/ tito for broad coverage - initiated meropenem as pt was spiking through zosyn (6/15- )  -wound care for skin wound over abdomen (SBO s/p sx c/b wound that never healed, )  -vanc to cover possible cellulitis of abdomen wound vs pna  -  HIDA scan shoes no cholecysitits    Endocrine  #DM2  glucose 249, a1c 7.4, on home metformin  -ISS  -add regimen if needed    #hyperkalemia  k 5.7--> now resolved 4.5 s/p insulin/d50 and bicarb drip  -monitor bmp    MSK  #chronic back pain  chronic back pain s/p multiple back surgeries w/ hardware on methadone, utox  positive for methadone only which per partner pt gest from pain specialist Dr Kwan, takes  5mg 2-4 pills qd   -methadone 5-20 mg qd, higher end if neede for pain control when off sedation        #cystectomy with nephrostomy  ct showing mod hydro but draining urine, R kidney atrophy, prostate cancer 1999 s/p radiation c/b radical cystectomy and nephrostomy w/ ileal conduit (in remission)  -urology consulted, f.u recs  -monitor UO from nephrostomy bag    Heme  anemia, normocytic, liekly dilutional s/p IVF anf all cell lines decreased, cbc stable  -monitor CBC    OTHER  F: none  E: replete PRN but cautious in HUGO vs CKD requiring HD  N: npo  DVT ppx: HSQ 5000 q8  other ppx: ppi  FULL CODE  dispo: MICU  Ambulate as tolerated 68 y/o M PMH smoker (50 years and current), sleep apnea w/o cpap, short-term mem loss, HTN, DM2, chronic back pain s/p multiple back surgeries w/ hardware on methadone, anxiety, SBO s/p sx c/b wound that never healed, prostate cancer 1999 s/p radiation c/b radical cystectomy and nephrostomy w/ ileal conduit (in remission), p/w SOB, found to have severe acidemia, sepsis 2/2 pyelonephritis vs PNA, new onset suspected lung cancer w/ mets, admitted to MICU.    Neurology  #agitation/ anxiety hx/ short term mem loss hx  -sedation and intubation until clinically improves  -c/w home sertraline when not sedated    #AMS  likely 2/2 uremia vs sepsis vs baseline per partner when has short term mem loss and gets confused and agitated  -intubated and sedated  -tx underlying cause as below    Cardiac  #mild aortic aneurysm  dilation of the aortic root and ascending aorta on ct, no known hx  -control sBP <150   -f/u echo    #hypotension  likely 2/2 sedation and sepsis  -c/w levophed for now, titrate as BP tolerates  -decreasing levophed requirements    #htn  hx of htn  -currently on pressors - holding home medications    Pulmonary  #lung cancer w/ mets  CT chest/ abd w/o contrast due to cr 4.5 showing 7cm RLL mass concerning for neoplasm w/ suspected hepatic and adrenal mets and abd LAD on ct, no known hx of lung cancer per partner, never been treated as pt was unaware, chronic smoker but never screened with CT per partner, dopplers neg for dvt  -outpt f/u  -pal care consult  -consider bronch for potential BAL for cytology  -if pna not improving, consider post-obstructive pna    #smoker   50 years and current, no recent ct scans  -nicotine patch prn when pt not sedated    #sleep apnea  offered cpap at home but did not use  -currently intubated  -cpap at night when off mech vent    #COPD  no dx, no inhalers, emphysema on CT scan of chest, suspect resp acidosis 2/2 COPD that is chronic and preventing compensation of met acidosis  -intubation to decrease WOB and increase RR if needed to compensate for metabolic acidosis  -duonebs q6 standing     #r/o PNA  CT chest shows RLL PNA, pt p/w SOB w/ tachypnea and WBC 30, no hypoxia, no fevers, s/p vanc/ cefepime in ED--> vanc and zosyn--> vanc tito  -sputum cx f/u from 6/16  -daily cxr  -vanc  and tito as below  -f/u bcx- ngtd    Renal  #uremia  bun ~70, unclear baseline, renal consulted--> improving 40 w/o HD to date  -f/u renal recs  -q12 bmp  -possible HD in near future  -monitor UO - goal 45cc/hr if not meeting goal than will tell renal need more urgent HD    #HUGO vs CKD  Cr 4.5, unknown baseline, improving to ~3  -renally dose medications  -as above    Metabolic  #AG metabolic acidosis uncompnesated  likely 2/2 uremia, pH vbg 7.12-->7.08-->improved to 7.37 after bicarb drip  and IVF LR (turned off bc fluid overloaded) but then decreased again after bicarb gtt off  -monitor abg, bmp  -renal consult as above  -intubated as above for resp compensation (high vt and rr)  -s/p Na bicarb pills, but pt hypernatremic, held and s/p bicarb 1 amp infusion 6/16  -bicarb prn or restart po when Na normalizes    #hypernatremia  was on sodium bicarb 1300 tid, now held for high Na, and changed drips and meds fluids from ns to d5, s/p d5 80 x 5 hours with improvement in Na  -monitor Na  -d5 as needed vs FW flushes  -cautious with Na products/ meds in NS    ID  # sepsis 2/2 pyelonephritis vs PNA  wbc 30--> 23, w/ tachypnea to 22 meets 2/4 sirs crtiria, afebrile, lactate 2.6. cxr clear, ct chest RLL PNA and under mass. ct scan showing perinephritic stranding and mod hydronephrosis concerning for UTI/ nephritis UA+, also shows distended gallbladder w/ stones concerning for, -  HIDA scan shows no cholecysitits urine cx and bcx ngtd, penil cx grows e feacalis sesnitive to vanc  -sputum cx f/u  -monitor wbc  -c/w vanc/ tito for PNA and possible pyelo and vanc to cover penile e fecalis - initiated meropenem as pt was spiking through zosyn (6/15- )  -dosing vanc by level for poor renal function  -wound care for skin wound over abdomen (SBO s/p sx c/b wound that never healed, )    Endocrine  #DM2  glucose 249, a1c 7.4, on home metformin  -ISS  -add regimen if needed    MSK  #chronic back pain  chronic back pain s/p multiple back surgeries w/ hardware on methadone, utox  positive for methadone only which per partner pt gest from pain specialist Dr Kwan, takes  5mg 2-4 pills qd   -methadone 5-20 mg qd, higher end if needed for pain control when off sedation      #cystectomy with nephrostomy  ct showing mod hydro but draining urine, R kidney atrophy, prostate cancer 1999 s/p radiation c/b radical cystectomy and nephrostomy w/ ileal conduit (in remission)  -urology consulted, f.u recs  -monitor UO from nephrostomy bag    GI  #npo with tube feeds  -tube feeds through ngt    constipation  hx of sbo, no sbo on ct abd  -bowel regimen    Heme  anemia, normocytic, liekly dilutional s/p IVF and all cell lines decreased, cbc stable  -monitor CBC    OTHER  F: none  E: replete PRN but cautious in HUGO vs CKD requiring HD  N: npo with tube feeds  DVT ppx: HSQ 5000 q8  other ppx: ppi  FULL CODE  dispo: MICU  Ambulate as tolerated 70 y/o M PMH smoker (50 years and current), sleep apnea w/o cpap, short-term mem loss, HTN, DM2, chronic back pain s/p multiple back surgeries w/ hardware on methadone, anxiety, SBO s/p sx c/b wound that never healed, prostate cancer 1999 s/p radiation c/b radical cystectomy and nephrostomy w/ ileal conduit (in remission), p/w SOB, found to have severe acidemia, sepsis 2/2 pyelonephritis vs PNA, new onset suspected lung cancer w/ mets, admitted to MICU.    Neurology  #agitation/ anxiety hx/ short term mem loss hx  -sedation and intubation until clinically improves  -c/w home sertraline when not sedated    #AMS  likely 2/2 uremia vs sepsis vs baseline per partner when has short term mem loss and gets confused and agitated  -intubated and sedated  -tx underlying cause as below    Cardiac  #mild aortic aneurysm  dilation of the aortic root and ascending aorta on ct, no known hx  -control sBP <150   -f/u echo    #hypotension  likely 2/2 sedation and sepsis  -c/w levophed for now, titrate as BP tolerates  -decreasing levophed requirements    #htn  hx of htn  -currently on pressors - holding home medications    Pulmonary  #lung cancer w/ mets  CT chest/ abd w/o contrast due to cr 4.5 showing 7cm RLL mass concerning for neoplasm w/ suspected hepatic and adrenal mets and abd LAD on ct, no known hx of lung cancer per partner, never been treated as pt was unaware, chronic smoker but never screened with CT per partner, dopplers neg for dvt  -outpt f/u  -pal care consult  -consider bronch for potential BAL for cytology  -if pna not improving, consider post-obstructive pna  -GOC per partner- living will no definitive decisions but leaves all GOC decisions to partner    #smoker   50 years and current, no recent ct scans  -nicotine patch prn when pt not sedated    #sleep apnea  offered cpap at home but did not use  -currently intubated  -cpap at night when off mech vent    #COPD  no dx, no inhalers, emphysema on CT scan of chest, suspect resp acidosis 2/2 COPD that is chronic and preventing compensation of met acidosis  -intubation to decrease WOB and increase RR if needed to compensate for metabolic acidosis  -duonebs q6 standing     #post obstructive PNA 2/2 suspected lung cancer  CT chest shows RLL PNA, pt p/w SOB w/ tachypnea and WBC 30, no hypoxia, no fevers, s/p vanc/ cefepime in ED--> vanc and zosyn--> vanc tito, sputum, cx ngtd  -daily cxr  -vanc  and tito as below  -f/u bcx- ngtd  -s/p bornchoscopy 6/17, f/u cytology and analysis    Renal  #uremia  bun ~70, unclear baseline, renal consulted--> improving 40 w/o HD to date  -f/u renal recs  -q12 bmp  -possible HD in near future  -monitor UO - goal 45cc/hr if not meeting goal than will tell renal need more urgent HD    #HUGO vs CKD  Cr 4.5, unknown baseline, improving to ~3  -renally dose medications  -as above    Metabolic  #AG metabolic acidosis uncompnesated  likely 2/2 uremia, pH vbg 7.12-->7.08-->improved to 7.37 after bicarb drip  and IVF LR (turned off bc fluid overloaded) but then decreased again after bicarb gtt off  -monitor abg, bmp  -renal consult as above  -intubated as above for resp compensation (high vt and rr)  -s/p Na bicarb pills, but pt hypernatremic, held and s/p bicarb 1 amp infusion 6/16  -bicarb prn or restart po when Na normalizes    #hypernatremia  was on sodium bicarb 1300 tid, now held for high Na, and changed drips and meds fluids from ns to d5, s/p d5 80 x 5 hours with improvement in Na  -monitor Na  -d5 as needed vs FW flushes  -cautious with Na products/ meds in NS    ID  # sepsis 2/2 pyelonephritis vs PNA  wbc 30--> 23, w/ tachypnea to 22 meets 2/4 sirs crtiria, afebrile, lactate 2.6. cxr clear, ct chest RLL PNA and under mass. ct scan showing perinephritic stranding and mod hydronephrosis concerning for UTI/ nephritis UA+, also shows distended gallbladder w/ stones concerning for, -  HIDA scan shows no cholecysitits urine cx and bcx ngtd, penil cx grows e feacalis sesnitive to vanc  -sputum cx f/u  -monitor wbc  -c/w vanc/ tito for PNA and possible pyelo and vanc to cover penile e fecalis - initiated meropenem as pt was spiking through zosyn (6/15- )  -dosing vanc by level for poor renal function  -wound care for skin wound over abdomen (SBO s/p sx c/b wound that never healed, )    Endocrine  #DM2  glucose 249, a1c 7.4, on home metformin  -ISS  -add regimen if needed    MSK  #chronic back pain  chronic back pain s/p multiple back surgeries w/ hardware on methadone, utox  positive for methadone only which per partner pt gest from pain specialist Dr Kwan, takes  5mg 2-4 pills qd   -methadone 5-20 mg qd, higher end if needed for pain control when off sedation      #cystectomy with nephrostomy  ct showing mod hydro but draining urine, R kidney atrophy, prostate cancer 1999 s/p radiation c/b radical cystectomy and nephrostomy w/ ileal conduit (in remission)  -urology consulted, f.u recs  -monitor UO from nephrostomy bag    GI  #npo with tube feeds  -tube feeds through ngt    constipation  hx of sbo, no sbo on ct abd  -bowel regimen    Heme  anemia, normocytic, liekly dilutional s/p IVF and all cell lines decreased, cbc stable  -monitor CBC    OTHER  F: none  E: replete PRN but cautious in HUGO vs CKD requiring HD  N: npo with tube feeds  DVT ppx: HSQ 5000 q8  other ppx: ppi  DNR, not DNI for now  dispo: MICU  Ambulate as tolerated

## 2019-06-17 NOTE — PROGRESS NOTE ADULT - ATTENDING COMMENTS
Patient seen and examined with house-staff during bedside rounds.  Resident note read, including vitals, physical findings, laboratory data, and radiological reports.   Revisions included below.  Direct personal management at bed side and extensive interpretation of the data.  Plan was outlined and discussed in details with the housestaff.  Decision making of high complexity  Action taken for acute disease activity to reflect the level of care provided:  - medication reconciliationu  - review laboratory data  The patient was made DNR by the healthcare proxy. Mental status is still unclear. Patient had some onset of the saturation and was related to mucous plug as it was associated with increasing the pressure bronchoscopy was performed and revealed almost occlusion right middle lobe and narrowing off the right lower lo. Patient is consistent with post-obstructive pneumonia. Secretion was sent for cytology culture. Continue current antimicrobial. Patient consistent with metastatic lung disease

## 2019-06-18 NOTE — PROGRESS NOTE ADULT - SUBJECTIVE AND OBJECTIVE BOX
JANIE ZABALA   MRN-[id number]         CC: Patient is a 69y old  Male who presents with a chief complaint of acidemia (18 Jun 2019 10:58)    HPI:  70 y/o M PMH HTN, smoker, chronic back pain, s/p radical cystectomy w/ one remaining kidney w/ nephrostomy /w SOB x 3 days. Pt denies fever, cough, CP. Pt in pain in R back. No belly pain. Could not provide hx of when had procedures or smoking hx.    In the ED, pt afebrile, HR 72, satting 100% on 3L NC, RR 22-->18, BP 90/60-->120s. Labs significant for WBC 30, K 5.7, bicarb 11, AG 20, VBG pH 7.12-->7.08, CO2 40, lactate 2.6, BUN ~70, Cr ~4, unknown baseline. Carboxyhemoglobin slightly elevated at 2.6. CXR clear. CT chest abd concerning for metastatic lung cancer, and possible cholecystitis and nephritis w/ hydro. S/p vanc, cefepime, ketamine, bicarb drip 150/hr, insulin/ d50, LR 1 L bolus. Transferred to [facility] MICU for emergent HD and further care. On arrival, renal and urology consulted. Pt putting out urine into nephrostomy tube and abg improved so no urgent HD, but renal will monitor and start HD if needed. Pt started on vanc/ zosyn for pyelonephritis and broad coverage while r/o pna and cholecystitis, or infected surgical wound. (13 Jun 2019 15:29)    SUBJECTIVE:  ROS:  UNABLE TO OBTAIN  due to:    DYSPNEA (Y/N):	  N/V (Y/N):	  SECRETIONS (Y/N):	  AGITATION (Y/N):  PAIN(Y/N):        -Provocation/Palliation:     -Quality/Quantity:     -Radiating:     -Severity:     -Timing/Frequency:     -Impact on ADLs:    PHYSICAL EXAM:  T(C): 37.7 (06-18-19 @ 09:24), Max: 38.8 (06-17-19 @ 18:00)  T(F): 99.8 (06-18-19 @ 09:24), Max: 101.8 (06-17-19 @ 18:00)  HR: 94 (06-18-19 @ 11:00) (56 - 94)  BP: 125/67 (06-18-19 @ 11:00) (83/44 - 135/61)  RR: 21 (06-18-19 @ 11:00) (16 - 22)  SpO2: 96% (06-18-19 @ 11:00) (92% - 99%)  Wt(kg): --  GENERAL:    HEENT:      	  NECK:           CVS:           	  RESP:        	  GI:             	  :            	  MUSC:       	  NEURO:     	  PSYCH:          SKIN:         	   LYMPH:         ALLERGIES:  No Known Allergies      OPIATE NAÏVE (Y/N):  -iStop reviewed (Y/N): Y    MEDICATIONS: reviewed  MEDICATIONS  (STANDING):  ALBUTerol/ipratropium for Nebulization 3 milliLiter(s) Nebulizer every 6 hours  amiodarone    Tablet 200 milliGRAM(s) Oral daily  chlorhexidine 0.12% Liquid 15 milliLiter(s) Oral Mucosa two times a day  chlorhexidine 4% Liquid 1 Application(s) Topical <User Schedule>  dextrose 5%. 1000 milliLiter(s) (50 mL/Hr) IV Continuous <Continuous>  dextrose 50% Injectable 12.5 Gram(s) IV Push once  dextrose 50% Injectable 25 Gram(s) IV Push once  dextrose 50% Injectable 25 Gram(s) IV Push once  fentaNYL   Infusion. 0.5 MICROgram(s)/kG/Hr (4.785 mL/Hr) IV Continuous <Continuous>  heparin  Injectable 7500 Unit(s) SubCutaneous every 8 hours  insulin lispro (HumaLOG) corrective regimen sliding scale   SubCutaneous every 6 hours  meropenem  IVPB 1000 milliGRAM(s) IV Intermittent every 12 hours  norepinephrine Infusion 0.05 MICROgram(s)/kG/Min (8.972 mL/Hr) IV Continuous <Continuous>  pantoprazole  Injectable 40 milliGRAM(s) IV Push daily  polyethylene glycol 3350 17 Gram(s) Oral daily  propofol Infusion 5 MICROgram(s)/kG/Min (2.871 mL/Hr) IV Continuous <Continuous>  senna 1 Tablet(s) Oral daily    MEDICATIONS  (PRN):  dextrose 40% Gel 15 Gram(s) Oral once PRN Blood Glucose LESS THAN 70 milliGRAM(s)/deciliter  glucagon  Injectable 1 milliGRAM(s) IntraMuscular once PRN Glucose LESS THAN 70 milligrams/deciliter      LABS: reviewed                        9.8    28.46 )-----------( 314      ( 18 Jun 2019 06:38 )             32.5     06-18    141  |  110<H>  |  41<H>  ----------------------------<  191<H>  3.9   |  18<L>  |  2.71<H>    Ca    8.2<L>      18 Jun 2019 06:36  Phos  4.1     06-17  Mg     2.2     06-17        PT/INR - ( 17 Jun 2019 06:10 )   PT: 13.7 sec;   INR: 1.21              IMAGING: reviewed    ADVANCE DIRECTIVES:    DNR      Living Will   Decision maker:  Legal surrogate:    PSYCHOSOCIAL-SPIRITUAL ASSESSMENT:  Reviewed    GOALS OF CARE DISCUSSION:  Palliative care info/counseling provided	       See previous Palliative Medicine Note  Documentation of GOC:     AGENCY CHOICE DISCUSSED:     none          REFERRALS:	   Palliative Med   Unit SW/Case Mgmt  Nutrition  PT/OT    [ ]CRITICAL CARE TIME PROVIDED TO UNSTABLE PT W/ ORGAN FAILURE            [x]> 50% OF THE TIME SPENT IN COUNSELING AND COORDINATING CARE     [ ]PROLONGED SERVICE       FACE TO FACE: [x]PT     [ ]PT & FAMILY    Start:               End:  	       Minutes: JANIE ZABALA   MRN-[id number]         CC: Patient is a 69y old  Male who presents with a chief complaint of acidemia (2019 10:58)    HPI:  70 y/o M PMH HTN, smoker, chronic back pain, s/p radical cystectomy w/ one remaining kidney w/ nephrostomy /w SOB x 3 days. Pt denies fever, cough, CP. Pt in pain in R back. No belly pain. Could not provide hx of when had procedures or smoking hx.    In the ED, pt afebrile, HR 72, satting 100% on 3L NC, RR 22-->18, BP 90/60-->120s. Labs significant for WBC 30, K 5.7, bicarb 11, AG 20, VBG pH 7.12-->7.08, CO2 40, lactate 2.6, BUN ~70, Cr ~4, unknown baseline. Carboxyhemoglobin slightly elevated at 2.6. CXR clear. CT chest abd concerning for metastatic lung cancer, and possible cholecystitis and nephritis w/ hydro. S/p vanc, cefepime, ketamine, bicarb drip 150/hr, insulin/ d50, LR 1 L bolus. Transferred to [facility] MICU for emergent HD and further care. On arrival, renal and urology consulted. Pt putting out urine into nephrostomy tube and abg improved so no urgent HD, but renal will monitor and start HD if needed. Pt started on vanc/ zosyn for pyelonephritis and broad coverage while r/o pna and cholecystitis, or infected surgical wound. (2019 15:29)    SUBJECTIVE: Have been on CPAP since ~6A today. Pt with high TF residuals per primary RN  ROS:  UNABLE TO OBTAIN  due to: sedated on fentanyl    DYSPNEA (Y/N):	  N/V (Y/N):	  SECRETIONS (Y/N):	  AGITATION (Y/N):  PAIN(Y/N):        -Provocation/Palliation:     -Quality/Quantity:     -Radiating:     -Severity:     -Timing/Frequency:     -Impact on ADLs:    PHYSICAL EXAM:  T(C): 37.7 (19 @ 09:24), Max: 38.8 (19 @ 18:00)  T(F): 99.8 (06-18-19 @ 09:24), Max: 101.8 (19 @ 18:00)  HR: 94 (19 @ 11:00) (56 - 94)  BP: 125/67 (19 @ 11:00) (83/44 - 135/61)  RR: 21 (19 @ 11:00) (16 - 22)  SpO2: 96% (19 @ 11:00) (92% - 99%)    GENERAL: Morbidly obese somewhat restless in ICU   HEENT: normocephalic, no spont. eye opening, cannot assess hearing adequately     	           CVS: SR on ECG          	  RESP: ETT to CPAP on vent        	  GI: NGT clamped, mid abd dsg, abd softly distended          	  : ileal conduit          	  MUSC: spont. mov't to all extremities noted      	  NEURO: propofol is off, still on fentanyl, does not open eyes to command    	  PSYCH: somewhat restless               ALLERGIES:  No Known Allergies    OPIATE NAÏVE (Y/N): n  -iStop reviewed (Y/N): Y, on initial consult    MEDICATIONS: reviewed  MEDICATIONS  (STANDING):  ALBUTerol/ipratropium for Nebulization 3 milliLiter(s) Nebulizer every 6 hours  amiodarone    Tablet 200 milliGRAM(s) Oral daily  chlorhexidine 0.12% Liquid 15 milliLiter(s) Oral Mucosa two times a day  chlorhexidine 4% Liquid 1 Application(s) Topical <User Schedule>  dextrose 5%. 1000 milliLiter(s) (50 mL/Hr) IV Continuous <Continuous>  dextrose 50% Injectable 12.5 Gram(s) IV Push once  dextrose 50% Injectable 25 Gram(s) IV Push once  dextrose 50% Injectable 25 Gram(s) IV Push once  fentaNYL   Infusion. 0.5 MICROgram(s)/kG/Hr (4.785 mL/Hr) IV Continuous <Continuous>  heparin  Injectable 7500 Unit(s) SubCutaneous every 8 hours  insulin lispro (HumaLOG) corrective regimen sliding scale   SubCutaneous every 6 hours  meropenem  IVPB 1000 milliGRAM(s) IV Intermittent every 12 hours  norepinephrine Infusion 0.05 MICROgram(s)/kG/Min (8.972 mL/Hr) IV Continuous <Continuous>  pantoprazole  Injectable 40 milliGRAM(s) IV Push daily  polyethylene glycol 3350 17 Gram(s) Oral daily  propofol Infusion 5 MICROgram(s)/kG/Min (2.871 mL/Hr) IV Continuous <Continuous>  senna 1 Tablet(s) Oral daily    MEDICATIONS  (PRN):  dextrose 40% Gel 15 Gram(s) Oral once PRN Blood Glucose LESS THAN 70 milliGRAM(s)/deciliter  glucagon  Injectable 1 milliGRAM(s) IntraMuscular once PRN Glucose LESS THAN 70 milligrams/deciliter    LABS: reviewed                        9.8    28.46 )-----------( 314      ( 2019 06:38 )             32.5     06-    141  |  110<H>  |  41<H>  ----------------------------<  191<H>  3.9   |  18<L>  |  2.71<H>    Ca    8.2<L>      2019 06:36  Phos  4.1       Mg     2.2         PT/INR - ( 2019 06:10 )   PT: 13.7 sec;   INR: 1.21     Culture - Body Fluid with Gram Stain (19 @ 09:40)    Gram Stain:   No organisms seen  Rare WBC's    -  Ampicillin: S <=2 Predicts results to ampicillin/sulbactam, amoxacillin-clavulanate and  piperacillin-tazobactam.    -  Vancomycin: S 1    Specimen Source: .Body Fluid Prostatic Fluid    Culture Results:   Rare Enterococcus faecalis    Organism Identification: Enterococcus faecalis    Organism: Enterococcus faecalis    Method Type: LIVIA    IMAGING: reviewed  < from: Xray Chest 1 View- PORTABLE-Routine (19 @ 05:55) >  EXAM:  XR CHEST PORTABLE ROUTINE 1V                        PROCEDURE DATE:  2019    < from: Xray Chest 1 View- PORTABLE-Routine (19 @ 05:55) >  IMPRESSION: Frontal view of the chest is compared to 2019 and   demonstrates no interval change in severe pulmonary edema. Persistent   large bilateral pleural effusions. Cardiomegaly. Bibasilar atelectasis.   Endotracheal tube in appropriate position. NG tube tip below the level of   the diaphragm.  < end of copied text >    ADVANCE DIRECTIVES:    DNR      Living Will   Decision maker: pt without capacity to make med decisions at this time, primary HCP is partner Vinod Bo  Legal surrogate: alt HCP on pt's Living Will is  sister Vaughn Harper, second alt HCP is friend Candelaria Antonio [phone] (c), [phone] (H)    PSYCHOSOCIAL-SPIRITUAL ASSESSMENT:  Reviewed    GOALS OF CARE DISCUSSION:  Palliative care info/counseling provided	       See previous Palliative Medicine Note  Documentation of GOC: cont. current treatment plan    AGENCY CHOICE DISCUSSED:     none          REFERRALS:	   Palliative Med   Unit SW/Case Mgmt  Nutrition  PT/OT    [ ]CRITICAL CARE TIME PROVIDED TO UNSTABLE PT W/ ORGAN FAILURE            [x]> 50% OF THE TIME SPENT IN COUNSELING AND COORDINATING CARE     [ ]PROLONGED SERVICE       FACE TO FACE: [x]PT     [ ]PT & FAMILY    Start:               End:  	       Minutes:

## 2019-06-18 NOTE — PROGRESS NOTE ADULT - ASSESSMENT
68 y/o morbidly obese M with h/o remote prostate ca s/p RT, bowel obstruction, right cystectomy with ileal conduit, HTN, spine surgery, chronic pain syndrome on methadone, CASEY, and progressive cognitive deficits per partner, p/w SOB, found to be in septic shock from pyleonephritis/pna and abnormal radiograph suggesting metastatic lung cancer, seen for goals of care

## 2019-06-18 NOTE — PROGRESS NOTE ADULT - ASSESSMENT
70 y/o M PMH HTN, chronic back pain, s/p radical cystectomy p/w SOB, found to have severe metabolic and respiratory acidemia, urosepsis with likely left pyelonephritis, with likely acute ATN on CKD, severe dehydration, likely metastatic cancer, admitted to MICU for emergent HD and further workup.    # Non-oliguric HUGO on unknown prior baseline renal function with slowly improving renal function with S cr to 2.7 in setting of prerenal HUGO from sepsis vs post obstructive with hydronephrosis vs ATN  - S cr downtrending to 2.7  -Non oliguric   - Urology team following for ileal conduit and urostomy  - monitor urine output via urostomy bag  - Adjust antibiotics as per renal dose clearance  - Monitor BMP daily  - Received IV fluids yesterday with net positive fluid balance in past 24 hours  -No urgent need of RRT/HD at present    # Hypernatremia corrected with Na level 141  - Continue Free water 150cc q 4 hrly for now  - Monitor BMP     # Respiratory acidosis with anion gap metabolic acidosis with metabolic alkalosis  - now intubated on vent  - Bicarbonate 18  - Consider add sodium bicarbonate 650mg po tid

## 2019-06-18 NOTE — PROGRESS NOTE ADULT - SUBJECTIVE AND OBJECTIVE BOX
INTERVAL HPI/OVERNIGHT EVENTS: Pt seen and examined at bedside. Intubated and sedated.      ICU Vital Signs Last 24 Hrs  T(C): 37.7 (2019 09:24), Max: 38.8 (2019 18:00)  T(F): 99.8 (2019 09:24), Max: 101.8 (2019 18:00)  HR: 96 (:00) (56 - 103)  BP: 83/49 (:00) (83/44 - 135/61)  BP(mean): 66 (:00) (55 - 99)  ABP: --  ABP(mean): --  RR: 20 (:00) (16 - 22)  SpO2: 98% (:00) (92% - 100%)    I&O's Summary    2019 07:01  -  2019 07:00  --------------------------------------------------------  IN: 5047.3 mL / OUT: 790 mL / NET: 4257.3 mL    2019 07:01  -  2019 13:43  --------------------------------------------------------  IN: 540.5 mL / OUT: 275 mL / NET: 265.5 mL      Mode: CPAP with PS  FiO2: 40  PEEP: 5  PS: 10  ITime: 1  MAP: 8.2  PIP: 16      LABS:                        9.8    28.46 )-----------( 314      ( 2019 06:38 )             32.5     06-18    141  |  110<H>  |  41<H>  ----------------------------<  191<H>  3.9   |  18<L>  |  2.71<H>    Ca    8.2<L>      2019 06:36  Phos  4.1     06-17  Mg     2.2     06-17      PT/INR - ( 2019 06:10 )   PT: 13.7 sec;   INR: 1.21            Urinalysis Basic - ( 2019 12:13 )    Color: Yellow / Appearance: SL Cloudy / S.015 / pH: x  Gluc: x / Ketone: NEGATIVE  / Bili: Small / Urobili: 0.2 E.U./dL   Blood: x / Protein: 100 mg/dL / Nitrite: NEGATIVE   Leuk Esterase: Moderate / RBC: < 5 /HPF / WBC Many /HPF   Sq Epi: x / Non Sq Epi: 0-5 /HPF / Bacteria: Many /HPF      CAPILLARY BLOOD GLUCOSE      POCT Blood Glucose.: 107 mg/dL (2019 11:27)  POCT Blood Glucose.: 170 mg/dL (2019 06:25)  POCT Blood Glucose.: 160 mg/dL (2019 00:12)  POCT Blood Glucose.: 103 mg/dL (2019 16:26)        RADIOLOGY & ADDITIONAL TESTS:    Consultant(s) Notes Reviewed:  [x ] YES  [ ] NO    MEDICATIONS  (STANDING):  ALBUTerol/ipratropium for Nebulization 3 milliLiter(s) Nebulizer every 6 hours  amiodarone    Tablet 200 milliGRAM(s) Oral daily  chlorhexidine 0.12% Liquid 15 milliLiter(s) Oral Mucosa two times a day  chlorhexidine 4% Liquid 1 Application(s) Topical <User Schedule>  dextrose 5%. 1000 milliLiter(s) (50 mL/Hr) IV Continuous <Continuous>  dextrose 50% Injectable 12.5 Gram(s) IV Push once  dextrose 50% Injectable 25 Gram(s) IV Push once  dextrose 50% Injectable 25 Gram(s) IV Push once  fentaNYL   Infusion. 0.5 MICROgram(s)/kG/Hr (4.785 mL/Hr) IV Continuous <Continuous>  heparin  Injectable 7500 Unit(s) SubCutaneous every 8 hours  insulin lispro (HumaLOG) corrective regimen sliding scale   SubCutaneous every 6 hours  meropenem  IVPB 1000 milliGRAM(s) IV Intermittent every 12 hours  norepinephrine Infusion 0.05 MICROgram(s)/kG/Min (8.972 mL/Hr) IV Continuous <Continuous>  pantoprazole  Injectable 40 milliGRAM(s) IV Push daily  polyethylene glycol 3350 17 Gram(s) Oral daily  propofol Infusion 5 MICROgram(s)/kG/Min (2.871 mL/Hr) IV Continuous <Continuous>  senna 1 Tablet(s) Oral daily    MEDICATIONS  (PRN):  dextrose 40% Gel 15 Gram(s) Oral once PRN Blood Glucose LESS THAN 70 milliGRAM(s)/deciliter  glucagon  Injectable 1 milliGRAM(s) IntraMuscular once PRN Glucose LESS THAN 70 milligrams/deciliter        PE:    Constitutional: intubated and sedated, NAD but agitated during sedation holiday  	Head: NC/AT  	Eyes: PERRL, EOMI, clear conjunctiva  	ENT: no nasal discharge; uvula midline, MMM, scab on tongue ET tube in place  	Neck: supple; no JVD or thyromegaly  	Respiratory: diffuse rhonchi, no increased WOB  	Cardiac: +S1/S2; RRR; no M/R/G;   	Gastrointestinal: distended abd with hard bulges, mildly TTP w/o rebound or guarding, nephrostomy bag in place in abdomen, dark red/brown large scab/ wound over central abdomen draining green fluid and with small blood, feeds thought NGT  	Extremities:  scaling grey skin in LEs, no edema  	Musculoskeletal: NROM x4; no joint swelling, tenderness or erythema    pscyh: agitated when awake    skin: no bruises or rashes INTERVAL HPI/OVERNIGHT EVENTS: Pt seen and examined at bedside. Intubated and sedated. ROS not obtainable.      ICU Vital Signs Last 24 Hrs  T(C): 37.7 (2019 09:24), Max: 38.8 (2019 18:00)  T(F): 99.8 (2019 09:24), Max: 101.8 (2019 18:00)  HR: 96 (:00) (56 - 103)  BP: 83/49 (:) (83/44 - 135/61)  BP(mean): 66 (:00) (55 - 99)  ABP: --  ABP(mean): --  RR: 20 (2019 13:00) (16 - 22)  SpO2: 98% (:00) (92% - 100%)    I&O's Summary    2019 07:01  -  2019 07:00  --------------------------------------------------------  IN: 5047.3 mL / OUT: 790 mL / NET: 4257.3 mL    2019 07:01  -  2019 13:43  --------------------------------------------------------  IN: 540.5 mL / OUT: 275 mL / NET: 265.5 mL      Mode: CPAP with PS  FiO2: 40  PEEP: 5  PS: 10  ITime: 1  MAP: 8.2  PIP: 16      LABS:                        9.8    28.46 )-----------( 314      ( 2019 06:38 )             32.5     06-18    141  |  110<H>  |  41<H>  ----------------------------<  191<H>  3.9   |  18<L>  |  2.71<H>    Ca    8.2<L>      2019 06:36  Phos  4.1     06-17  Mg     2.2     06-17      PT/INR - ( 2019 06:10 )   PT: 13.7 sec;   INR: 1.21            Urinalysis Basic - ( 2019 12:13 )    Color: Yellow / Appearance: SL Cloudy / S.015 / pH: x  Gluc: x / Ketone: NEGATIVE  / Bili: Small / Urobili: 0.2 E.U./dL   Blood: x / Protein: 100 mg/dL / Nitrite: NEGATIVE   Leuk Esterase: Moderate / RBC: < 5 /HPF / WBC Many /HPF   Sq Epi: x / Non Sq Epi: 0-5 /HPF / Bacteria: Many /HPF      CAPILLARY BLOOD GLUCOSE      POCT Blood Glucose.: 107 mg/dL (2019 11:27)  POCT Blood Glucose.: 170 mg/dL (2019 06:25)  POCT Blood Glucose.: 160 mg/dL (2019 00:12)  POCT Blood Glucose.: 103 mg/dL (2019 16:26)        RADIOLOGY & ADDITIONAL TESTS:    Consultant(s) Notes Reviewed:  [x ] YES  [ ] NO    MEDICATIONS  (STANDING):  ALBUTerol/ipratropium for Nebulization 3 milliLiter(s) Nebulizer every 6 hours  amiodarone    Tablet 200 milliGRAM(s) Oral daily  chlorhexidine 0.12% Liquid 15 milliLiter(s) Oral Mucosa two times a day  chlorhexidine 4% Liquid 1 Application(s) Topical <User Schedule>  dextrose 5%. 1000 milliLiter(s) (50 mL/Hr) IV Continuous <Continuous>  dextrose 50% Injectable 12.5 Gram(s) IV Push once  dextrose 50% Injectable 25 Gram(s) IV Push once  dextrose 50% Injectable 25 Gram(s) IV Push once  fentaNYL   Infusion. 0.5 MICROgram(s)/kG/Hr (4.785 mL/Hr) IV Continuous <Continuous>  heparin  Injectable 7500 Unit(s) SubCutaneous every 8 hours  insulin lispro (HumaLOG) corrective regimen sliding scale   SubCutaneous every 6 hours  meropenem  IVPB 1000 milliGRAM(s) IV Intermittent every 12 hours  norepinephrine Infusion 0.05 MICROgram(s)/kG/Min (8.972 mL/Hr) IV Continuous <Continuous>  pantoprazole  Injectable 40 milliGRAM(s) IV Push daily  polyethylene glycol 3350 17 Gram(s) Oral daily  propofol Infusion 5 MICROgram(s)/kG/Min (2.871 mL/Hr) IV Continuous <Continuous>  senna 1 Tablet(s) Oral daily    MEDICATIONS  (PRN):  dextrose 40% Gel 15 Gram(s) Oral once PRN Blood Glucose LESS THAN 70 milliGRAM(s)/deciliter  glucagon  Injectable 1 milliGRAM(s) IntraMuscular once PRN Glucose LESS THAN 70 milligrams/deciliter        PE:    Constitutional: intubated and sedated, NAD but agitated during sedation holiday  	Head: NC/AT  	Eyes: PERRL, EOMI, clear conjunctiva  	ENT: no nasal discharge; uvula midline, MMM, scab on tongue ET tube in place  	Neck: supple; no JVD or thyromegaly  	Respiratory: diffuse rhonchi, no increased WOB  	Cardiac: +S1/S2; RRR; no M/R/G;   	Gastrointestinal: distended abd with hard bulges, mildly TTP w/o rebound or guarding, nephrostomy bag in place in abdomen, dark red/brown large scab/ wound over central abdomen draining green fluid and with small blood, feeds thought NGT  	Extremities:  scaling grey skin in LEs, no edema  	Musculoskeletal: NROM x4; no joint swelling, tenderness or erythema    pscyh: agitated when awake    skin: no bruises or rashes

## 2019-06-18 NOTE — PROGRESS NOTE ADULT - ASSESSMENT
68 y/o M PMH smoker (50 years and current), sleep apnea w/o cpap, short-term mem loss, HTN, DM2, chronic back pain s/p multiple back surgeries w/ hardware on methadone, anxiety, SBO s/p sx c/b wound that never healed, prostate cancer 1999 s/p radiation c/b radical cystectomy and nephrostomy w/ ileal conduit (in remission), p/w SOB, found to have severe acidemia, sepsis 2/2 pyelonephritis vs PNA, new onset suspected lung cancer w/ mets, admitted to MICU.    Neurology  #TME  agitation/ anxiety hx/ short term mem loss hx, likely 2/2 uremia vs sepsis vs baseline per partner when has short term mem loss and gets confused and agitated  -sedation and intubation until clinically improves  -c/w home sertraline when not sedated  -start Seroquel 25 bid (monitor qtc)  -tx underlying cause as below    Cardiac  #mild aortic aneurysm  dilation of the aortic root and ascending aorta on ct, no known hx, no AR on echo  -control sBP <150     #hypotension  likely 2/2 sedation and sepsis, lactate improved to 2.1  -c/w levophed for now, titrate as BP tolerates  -decreasing levophed requirements    #htn  hx of htn  -currently on pressors - holding home anti-hypertensive medications    Pulmonary  #lung cancer w/ mets  CT chest/ abd w/o contrast due to cr 4.5 showing 7cm RLL mass concerning for neoplasm w/ suspected hepatic and adrenal mets and abd LAD on ct, no known hx of lung cancer per partner, never been treated as pt was unaware, chronic smoker but never screened with CT per partner, dopplers neg for dvt, bronchoscopy 6/17 showing RML mucous plugging, sent for sputum cx and cytology, GOC per partner- living will no definitive decisions but leaves all GOC decisions to partner, pal care consulted, pt DNR, -suspect PNA is post-obstructive pna 2/2 mass  -f/u bronch cytology and rediscuss pgx and GOC with partner/ HCP  -pal care consulted    #smoker   50 years and current, no recent ct scans  -nicotine patch prn when pt not sedated    #sleep apnea  offered cpap at home but did not use  -currently intubated  -cpap at night when off mech vent    #COPD  no dx, no inhalers, emphysema on CT scan of chest, suspect resp acidosis 2/2 COPD that is chronic and preventing compensation of met acidosis  -intubation to decrease WOB and increase RR if needed to compensate for metabolic acidosis  -duonebs q6 standing     #post obstructive PNA 2/2 suspected lung cancer  CT chest shows RLL PNA, pt p/w SOB w/ tachypnea and WBC 30, no hypoxia, no fevers, s/p vanc/ cefepime in ED--> vanc and zosyn--> vanc tito--> tito, dcd vanc 6/18, bcx ngtd, sputum cx ngtd  -f/u sputum cx bronch  -daily cxr  -c.w tito  -s/p bronchoscopy 6/17, f/u cytology and analysis    Renal  #HUGO on CKD  Cr 4.5 on admission, baseline cr 1.6 2/19 per PMD, cr improving to ~2.9, pt also with uremia that is downtrending, renal consulted and are monitoring but currently no HD required and pt making urine  -f/u renal recs  -q12 bmp  -possible HD in near future  -monitor UO - goal 45cc/hr if not meeting goal than will tell renal need more urgent HD  -renally dose medications    Metabolic  #AG metabolic acidosis   likely 2/2 uremia bun 48, lactate 2.1, ph 7.3 and improving, w/ adequate compensation per navarro formula,  co2 33  -monitor abg, bmp  -renal consult as above  -intubated as above for resp compensation (high vt and rr)  -s/p Na bicarb pills, but pt hypernatremic, held and s/p bicarb 1 amp infusion 6/16  -consider restarting bicarb po or IV     #hypernatremia  was on sodium bicarb 1300 tid, now held for high Na, and changed drips and meds fluids from ns to d5, s/p d5 80 x 5 hours with improvement in Na, Na now 141, d5 stopped  -monitor Na for overcorrection  -d5 prn  -c/w FW flushes w/ feeds  -cautious with Na products/ meds in NS if goes hyper na again    ID  # sepsis 2/2 pyelonephritis vs PNA  wbc 30--> 23, w/ tachypnea to 22 meets 2/4 sirs crtiria, afebrile, lactate 2.6. cxr clear, ct chest RLL PNA and under mass. ct scan showing perinephritic stranding and mod hydronephrosis concerning for UTI/ nephritis UA+, also shows distended gallbladder w/ stones concerning for, -  HIDA scan shows no cholecysitits, urine cx and bcx ngtd but urine was poor sample so repeatd, penil cx grows e faecalis sensitive to amp/vanc/ tito  -sputum cx f/u from bronch  -monitor wbc  -c/w tito since 6/15 for PNA and possible pyelo   -wound care for skin wound over abdomen (SBO s/p sx c/b wound that never healed, )  -dcd vanc 6/18  -f/u repeat ucx from 6/16    Endocrine  #DM2  a1c 7.4, on home metformin  -ISS  -add insulin regimen if needed    MSK  #chronic back pain  chronic back pain s/p multiple back surgeries w/ hardware on methadone, utox  positive for methadone only which per partner pt gest from pain specialist Dr Kwan, takes  5mg 2-4 pills qd   -methadone 5-20 mg qd, higher end if needed for pain control when off sedation      #cystectomy with nephrostomy  ct showing mod hydro but draining urine, R kidney atrophy, prostate cancer 1999 s/p radiation c/b radical cystectomy and nephrostomy w/ ileal conduit (in remission)  -urology consulted, f.u recs  -monitor UO from nephrostomy bag    GI  #npo with tube feeds  -tube feeds through ngt    #constipation  hx of sbo, no sbo on ct abd, belly benign on exam  -bowel regimen  -if residuals in feeds >500, add reglan    Heme  anemia, normocytic, liekly dilutional s/p IVF and all cell lines decreased, cbc stable  -monitor CBC    OTHER  F: none  E: replete PRN but cautious in [place] in CKD  N: npo with tube feeds  DVT ppx: HSQ 5000 q8  other ppx: ppi  DNR, not DNI for now  dispo: MICU  Ambulate as tolerated 68 y/o M PMH smoker (50 years and current), sleep apnea w/o cpap, short-term mem loss, HTN, DM2, chronic back pain s/p multiple back surgeries w/ hardware on methadone, anxiety, SBO s/p sx c/b wound that never healed, prostate cancer 1999 s/p radiation c/b radical cystectomy and nephrostomy w/ ileal conduit (in remission), p/w SOB, found to have severe acidemia, sepsis 2/2 pyelonephritis vs PNA, new onset suspected lung cancer w/ mets, admitted to MICU. Now with acute hypoxemic respiratory failure and likely toxic metabolic encephalopathy    Neurology  #TME  agitation/ anxiety hx/ short term mem loss hx, likely 2/2 uremia vs sepsis vs baseline per partner when has short term mem loss and gets confused and agitated  -sedation and intubation until clinically improves  -c/w home sertraline when not sedated  -start Seroquel 25 bid (monitor qtc)  -tx underlying cause as below    Cardiac  #mild aortic aneurysm  dilation of the aortic root and ascending aorta on ct, no known hx, no AR on echo  -control sBP <150     #hypotension  likely 2/2 sedation and sepsis, lactate improved to 2.1  -c/w levophed for now, titrate as BP tolerates  -decreasing levophed requirements    #htn  hx of htn  -currently on pressors - holding home anti-hypertensive medications    Pulmonary  #lung cancer w/ mets  CT chest/ abd w/o contrast due to cr 4.5 showing 7cm RLL mass concerning for neoplasm w/ suspected hepatic and adrenal mets and abd LAD on ct, no known hx of lung cancer per partner, never been treated as pt was unaware, chronic smoker but never screened with CT per partner, dopplers neg for dvt, bronchoscopy 6/17 showing RML mucous plugging, sent for sputum cx and cytology, GOC per partner- living will no definitive decisions but leaves all GOC decisions to partner, pal care consulted, pt DNR, -suspect PNA is post-obstructive pna 2/2 mass  -f/u bronch cytology and rediscuss pgx and GOC with partner/ HCP  -pal care consulted    #smoker   50 years and current, no recent ct scans  -nicotine patch prn when pt not sedated    #sleep apnea  offered cpap at home but did not use  -currently intubated  -cpap at night when off mech vent    #COPD  no dx, no inhalers, emphysema on CT scan of chest, suspect resp acidosis 2/2 COPD that is chronic and preventing compensation of met acidosis  -intubation to decrease WOB and increase RR if needed to compensate for metabolic acidosis  -duonebs q6 standing     #post obstructive PNA 2/2 suspected lung cancer  CT chest shows RLL PNA, pt p/w SOB w/ tachypnea and WBC 30, no hypoxia, no fevers, s/p vanc/ cefepime in ED--> vanc and zosyn--> vanc tito--> tito, dcd vanc 6/18, bcx ngtd, sputum cx ngtd  -f/u sputum cx bronch  -daily cxr  -c.w tito  -s/p bronchoscopy 6/17, f/u cytology and analysis    Renal  #HUGO on CKD  Cr 4.5 on admission, baseline cr 1.6 2/19 per PMD, cr improving to ~2.9, pt also with uremia that is downtrending, renal consulted and are monitoring but currently no HD required and pt making urine  -f/u renal recs  -q12 bmp  -possible HD in near future  -monitor UO - goal 45cc/hr if not meeting goal than will tell renal need more urgent HD  -renally dose medications    Metabolic  #AG metabolic acidosis   likely 2/2 uremia bun 48, lactate 2.1, ph 7.3 and improving, w/ adequate compensation per navarro formula,  co2 33  -monitor abg, bmp  -renal consult as above  -intubated as above for resp compensation (high vt and rr)  -s/p Na bicarb pills, but pt hypernatremic, held and s/p bicarb 1 amp infusion 6/16  -consider restarting bicarb po or IV     #hypernatremia  was on sodium bicarb 1300 tid, now held for high Na, and changed drips and meds fluids from ns to d5, s/p d5 80 x 5 hours with improvement in Na, Na now 141, d5 stopped  -monitor Na for overcorrection  -d5 prn  -c/w FW flushes w/ feeds  -cautious with Na products/ meds in NS if goes hyper na again    ID  # sepsis 2/2 pyelonephritis vs PNA  wbc 30--> 23, w/ tachypnea to 22 meets 2/4 sirs crtiria, afebrile, lactate 2.6. cxr clear, ct chest RLL PNA and under mass. ct scan showing perinephritic stranding and mod hydronephrosis concerning for UTI/ nephritis UA+, also shows distended gallbladder w/ stones concerning for, -  HIDA scan shows no cholecysitits, urine cx and bcx ngtd but urine was poor sample so repeatd, penil cx grows e faecalis sensitive to amp/vanc/ tito  -sputum cx f/u from bronch  -monitor wbc  -c/w tito since 6/15 for PNA and possible pyelo   -wound care for skin wound over abdomen (SBO s/p sx c/b wound that never healed, )  -dcd vanc 6/18  -f/u repeat ucx from 6/16    Endocrine  #DM2  a1c 7.4, on home metformin  -ISS  -add insulin regimen if needed    MSK  #chronic back pain  chronic back pain s/p multiple back surgeries w/ hardware on methadone, utox  positive for methadone only which per partner pt gest from pain specialist Dr Kwan, takes  5mg 2-4 pills qd   -methadone 5-20 mg qd, higher end if needed for pain control when off sedation      #cystectomy with nephrostomy  ct showing mod hydro but draining urine, R kidney atrophy, prostate cancer 1999 s/p radiation c/b radical cystectomy and nephrostomy w/ ileal conduit (in remission)  -urology consulted, f.u recs  -monitor UO from nephrostomy bag    GI  #npo with tube feeds  -tube feeds through ngt    #constipation  hx of sbo, no sbo on ct abd, belly benign on exam  -bowel regimen  -if residuals in feeds >500, add reglan    Heme  anemia, normocytic, liekly dilutional s/p IVF and all cell lines decreased, cbc stable  -monitor CBC    OTHER  F: none  E: replete PRN but cautious in [place] in CKD  N: npo with tube feeds  DVT ppx: HSQ 5000 q8  other ppx: ppi  DNR, not DNI for now  dispo: MICU

## 2019-06-18 NOTE — PROGRESS NOTE ADULT - SUBJECTIVE AND OBJECTIVE BOX
Patient is a 69y Male seen and evaluated at bedside. Patient remains critically ill on ventilatory support and IV pressors today. Received IV fluids yesterday with slowly improving renal function to S Cr  2.7. CVP 10 today morning. Na level improving to 141 on free water through NG tube.      ALBUTerol/ipratropium for Nebulization 3 every 6 hours  amiodarone    Tablet 200 daily  chlorhexidine 0.12% Liquid 15 two times a day  chlorhexidine 4% Liquid 1 <User Schedule>  dextrose 40% Gel 15 once PRN  dextrose 5%. 1000 <Continuous>  dextrose 50% Injectable 12.5 once  dextrose 50% Injectable 25 once  dextrose 50% Injectable 25 once  fentaNYL   Infusion. 0.5 <Continuous>  glucagon  Injectable 1 once PRN  heparin  Injectable 7500 every 8 hours  insulin lispro (HumaLOG) corrective regimen sliding scale  every 6 hours  meropenem  IVPB 1000 every 12 hours  norepinephrine Infusion 0.05 <Continuous>  pantoprazole  Injectable 40 daily  polyethylene glycol 3350 17 daily  propofol Infusion 5 <Continuous>  senna 1 daily      Allergies    No Known Allergies    Intolerances        T(C): , Max: 38.8 (19 @ 18:00)  T(F): , Max: 101.8 (19 @ 18:00)  HR: 80 (19 @ 10:00)  BP: 98/50 (19 @ 10:00)  BP(mean): 72 (19 @ 10:00)  RR: 16 (19 @ 10:00)  SpO2: 94% (19 @ 10:00)  Wt(kg): --     @ 07:  -   @ 07:00  --------------------------------------------------------  IN: 5047.3 mL / OUT: 790 mL / NET: 4257.3 mL     @ 07:01  -   @ 10:59  --------------------------------------------------------  IN: 338.3 mL / OUT: 80 mL / NET: 258.3 mL          Review of Systems:  Limited. Patient sedated and intubated. unable to follow verbal commands    PHYSICAL EXAM:  GENERAL: Ill appearing, lying in bed, sedated and intubated on ventilatory support  HEAD:  Atraumatic, Normocephalic,   EYES: Bilateral conjunctival and scleral pallor   Oral cavity: Oral mucosa dry and pale  NECK: Neck supple, No JVD  CHEST/LUNG: Bilateral decreased breath sounds, Right>left, Bibasilar rhonchi, no wheezing  HEART: Regular rate and rhythm. KJ II/VI at LPSB, No gallop, no rub   ABDOMEN: Soft, nontender, non distended, bowel sounds present, Ileal conduit noted  EXTREMITIES: Bilateral leg no edema, no cyanosis, no clubbing  Neurology: AAOx0, sedated and intubated on ventilatory support. unable to follow verbal commands  SKIN: No rashes or lesions      LABS:                        9.8    28.46 )-----------( 314      ( 2019 06:38 )             32.5     06-18    141  |  110<H>  |  41<H>  ----------------------------<  191<H>  3.9   |  18<L>  |  2.71<H>    Ca    8.2<L>      2019 06:36  Phos  4.1       Mg     2.2             PT/INR - ( 2019 06:10 )   PT: 13.7 sec;   INR: 1.21            Urinalysis Basic - ( 2019 12:13 )    Color: Yellow / Appearance: SL Cloudy / S.015 / pH: x  Gluc: x / Ketone: NEGATIVE  / Bili: Small / Urobili: 0.2 E.U./dL   Blood: x / Protein: 100 mg/dL / Nitrite: NEGATIVE   Leuk Esterase: Moderate / RBC: < 5 /HPF / WBC Many /HPF   Sq Epi: x / Non Sq Epi: 0-5 /HPF / Bacteria: Many /HPF      Sodium, Random Urine: 49 mmol/L ( @ 12:13)  Creatinine, Random Urine: 144 mg/dL ( @ 12:13)  Protein/Creatinine Ratio Calculation: 2.1 Ratio ( @ 12:13)  Osmolality, Random Urine: 334 mosmol/kg ( @ 12:13)        RADIOLOGY & ADDITIONAL STUDIES:  < from: Xray Chest 1 View- PORTABLE-Routine (19 @ 05:55) >    EXAM:  XR CHEST PORTABLE ROUTINE 1V                          PROCEDURE DATE:  2019          INTERPRETATION:  Clinical history/reason for exam: Pneumonia.    Single frontal view.    Comparison: 2019.    Findings/  impression: Stable positioning of support devices. Right opacity/pleural   effusion, increased.. Left basilar opacity, increased.. Stable heart   size. Cervical spine hardware.            Thank you for the opportunity to participate in the care of this patient.        NATE   This document has been electronically signed. 2019  9:00AM                  < end of copied text > Patient is a 69y Male seen and evaluated at bedside. Patient remains critically ill on ventilatory support and IV pressors today. Received IV fluids yesterday with slowly improving renal function to S Cr  2.7. CVP 10 today morning. Na level improving to 141 on free water through NG tube. S/p bronchoscopy yesterday      ALBUTerol/ipratropium for Nebulization 3 every 6 hours  amiodarone    Tablet 200 daily  chlorhexidine 0.12% Liquid 15 two times a day  chlorhexidine 4% Liquid 1 <User Schedule>  dextrose 40% Gel 15 once PRN  dextrose 5%. 1000 <Continuous>  dextrose 50% Injectable 12.5 once  dextrose 50% Injectable 25 once  dextrose 50% Injectable 25 once  fentaNYL   Infusion. 0.5 <Continuous>  glucagon  Injectable 1 once PRN  heparin  Injectable 7500 every 8 hours  insulin lispro (HumaLOG) corrective regimen sliding scale  every 6 hours  meropenem  IVPB 1000 every 12 hours  norepinephrine Infusion 0.05 <Continuous>  pantoprazole  Injectable 40 daily  polyethylene glycol 3350 17 daily  propofol Infusion 5 <Continuous>  senna 1 daily      Allergies    No Known Allergies    Intolerances        T(C): , Max: 38.8 (19 @ 18:00)  T(F): , Max: 101.8 (19 @ 18:00)  HR: 80 (19 @ 10:00)  BP: 98/50 (19 @ 10:00)  BP(mean): 72 (19 @ 10:00)  RR: 16 (19 @ 10:00)  SpO2: 94% (19 @ 10:00)  Wt(kg): --     @ 07:  -   @ 07:00  --------------------------------------------------------  IN: 5047.3 mL / OUT: 790 mL / NET: 4257.3 mL     @ 07:01  -   @ 10:59  --------------------------------------------------------  IN: 338.3 mL / OUT: 80 mL / NET: 258.3 mL          Review of Systems:  Limited. Patient sedated and intubated. unable to follow verbal commands    PHYSICAL EXAM:  GENERAL: Ill appearing, lying in bed, sedated and intubated on ventilatory support  HEAD:  Atraumatic, Normocephalic,   EYES: Bilateral conjunctival and scleral pallor   Oral cavity: Oral mucosa dry and pale  NECK: Neck supple, No JVD  CHEST/LUNG: Bilateral decreased breath sounds, Right>left, Bibasilar rhonchi, no wheezing  HEART: Regular rate and rhythm. KJ II/VI at LPSB, No gallop, no rub   ABDOMEN: Soft, nontender, non distended, bowel sounds present, Ileal conduit noted  EXTREMITIES: Bilateral leg no edema, no cyanosis, no clubbing  Neurology: AAOx0, sedated and intubated on ventilatory support. unable to follow verbal commands  SKIN: No rashes or lesions      LABS:                        9.8    28.46 )-----------( 314      ( 2019 06:38 )             32.5     06-18    141  |  110<H>  |  41<H>  ----------------------------<  191<H>  3.9   |  18<L>  |  2.71<H>    Ca    8.2<L>      2019 06:36  Phos  4.1       Mg     2.2             PT/INR - ( 2019 06:10 )   PT: 13.7 sec;   INR: 1.21            Urinalysis Basic - ( 2019 12:13 )    Color: Yellow / Appearance: SL Cloudy / S.015 / pH: x  Gluc: x / Ketone: NEGATIVE  / Bili: Small / Urobili: 0.2 E.U./dL   Blood: x / Protein: 100 mg/dL / Nitrite: NEGATIVE   Leuk Esterase: Moderate / RBC: < 5 /HPF / WBC Many /HPF   Sq Epi: x / Non Sq Epi: 0-5 /HPF / Bacteria: Many /HPF      Sodium, Random Urine: 49 mmol/L ( @ 12:13)  Creatinine, Random Urine: 144 mg/dL ( @ 12:13)  Protein/Creatinine Ratio Calculation: 2.1 Ratio ( @ 12:13)  Osmolality, Random Urine: 334 mosmol/kg ( @ 12:13)        RADIOLOGY & ADDITIONAL STUDIES:  < from: Xray Chest 1 View- PORTABLE-Routine (19 @ 05:55) >    EXAM:  XR CHEST PORTABLE ROUTINE 1V                          PROCEDURE DATE:  2019          INTERPRETATION:  Clinical history/reason for exam: Pneumonia.    Single frontal view.    Comparison: 2019.    Findings/  impression: Stable positioning of support devices. Right opacity/pleural   effusion, increased.. Left basilar opacity, increased.. Stable heart   size. Cervical spine hardware.            Thank you for the opportunity to participate in the care of this patient.        NATE   This document has been electronically signed. 2019  9:00AM                  < end of copied text >

## 2019-06-19 NOTE — PROGRESS NOTE ADULT - ATTENDING COMMENTS
renal fxn improved   congestion seems increased - can use lasix if tolerates  overall prognosis appears poor - agree with goals of care discussion

## 2019-06-19 NOTE — CHART NOTE - NSCHARTNOTEFT_GEN_A_CORE
Admitting Diagnosis:   Patient is a 69y old  Male who presents with a chief complaint of acidemia (19 Jun 2019 11:20)      PAST MEDICAL & SURGICAL HISTORY:  Chronic back pain  HTN (hypertension)  H/O total cystectomy      Current Nutrition Order:  Nepro @ 37mL/hr x 24hrs plus 1 ProStat via NGT. (888mL TV, 1698 kcal, 87g pro, 646mL free H2O, 94% RDI)       PO Intake: Good (%) [   ]  Fair (50-75%) [   ] Poor (<25%) [   ]- N/A NPO w/EN    GI Issues: Unable to assess at this time 2/2 vent; BM 6/19 (aggressive BM started)  400cc residuals on 6/18- none reported overnight per RN    Pain: Unable to assess at this time 2/2 vent; sedated    Skin Integrity:    Labs:   06-19    142  |  110<H>  |  43<H>  ----------------------------<  170<H>  4.0   |  18<L>  |  2.62<H>    Ca    8.4      19 Jun 2019 07:04  Phos  4.6     06-19  Mg     1.8     06-19    TPro  6.3  /  Alb  2.1<L>  /  TBili  0.3  /  DBili  x   /  AST  24  /  ALT  11  /  AlkPhos  118  06-19    CAPILLARY BLOOD GLUCOSE      POCT Blood Glucose.: 156 mg/dL (19 Jun 2019 12:13)  POCT Blood Glucose.: 169 mg/dL (19 Jun 2019 06:56)  POCT Blood Glucose.: 165 mg/dL (19 Jun 2019 01:08)  POCT Blood Glucose.: 124 mg/dL (18 Jun 2019 17:01)      Medications:  MEDICATIONS  (STANDING):  ALBUTerol/ipratropium for Nebulization 3 milliLiter(s) Nebulizer every 6 hours  amiodarone    Tablet 200 milliGRAM(s) Oral daily  chlorhexidine 0.12% Liquid 15 milliLiter(s) Oral Mucosa two times a day  chlorhexidine 4% Liquid 1 Application(s) Topical <User Schedule>  dextrose 5%. 1000 milliLiter(s) (50 mL/Hr) IV Continuous <Continuous>  dextrose 50% Injectable 12.5 Gram(s) IV Push once  dextrose 50% Injectable 25 Gram(s) IV Push once  dextrose 50% Injectable 25 Gram(s) IV Push once  fentaNYL   Infusion. 0.5 MICROgram(s)/kG/Hr (4.785 mL/Hr) IV Continuous <Continuous>  heparin  Injectable 7500 Unit(s) SubCutaneous every 8 hours  insulin lispro (HumaLOG) corrective regimen sliding scale   SubCutaneous every 6 hours  meropenem  IVPB 1000 milliGRAM(s) IV Intermittent every 12 hours  norepinephrine Infusion 0.05 MICROgram(s)/kG/Min (8.972 mL/Hr) IV Continuous <Continuous>  pantoprazole   Suspension 40 milliGRAM(s) Enteral Tube daily  polyethylene glycol 3350 17 Gram(s) Oral daily  propofol Infusion 5 MICROgram(s)/kG/Min (2.871 mL/Hr) IV Continuous <Continuous>  QUEtiapine 25 milliGRAM(s) Oral every 12 hours  senna 2 Tablet(s) Oral at bedtime    MEDICATIONS  (PRN):  dextrose 40% Gel 15 Gram(s) Oral once PRN Blood Glucose LESS THAN 70 milliGRAM(s)/deciliter  glucagon  Injectable 1 milliGRAM(s) IntraMuscular once PRN Glucose LESS THAN 70 milligrams/deciliter      Weight:  Daily     Daily     Weight Change:     Nutrition Focused Physical Exam: Completed [   ]  Not Pertinent [   ]  Muscle Wasting- Temporal [   ]  Clavicle/Pectoral [   ]  Shoulder/Deltoid [   ]  Scapula [   ]  Interosseous [   ]  Quadriceps [   ]  Gastrocnemius [   ]  Fat Wasting- Orbital [   ]  Buccal [   ]  Triceps [   ]  Rib [   ]  Suspect [PCM] 2/2 to physical assessment, [poor intake], and [wt loss]; please see malnutrition chart note.    Estimated energy needs:     Subjective: 68 yo/male with PMHx HTN, chronic back pain, s/p radical cystectomy, nephrostomy in remaining kidney, admitted with SOB and found to have severe acidemia. CT chest c/f lung cancer w/mets. Pt seen in room and discussed during MICU rounds.    Previous Nutrition Diagnosis:    Active [   ]  Resolved [   ]    If resolved, new PES:     Goal:    Recommendations:    Education:     Risk Level: High [   ] Moderate [   ] Low [   ] Admitting Diagnosis:   Patient is a 69y old  Male who presents with a chief complaint of acidemia (19 Jun 2019 11:20)      PAST MEDICAL & SURGICAL HISTORY:  Chronic back pain  HTN (hypertension)  H/O total cystectomy      Current Nutrition Order:  Nepro @ 37mL/hr x 24hrs plus 1 ProStat via NGT. (888mL TV, 1698 kcal, 87g pro, 646mL free H2O, 94% RDI)       PO Intake: Good (%) [   ]  Fair (50-75%) [   ] Poor (<25%) [   ]- N/A NPO w/EN    GI Issues: Unable to assess at this time 2/2 vent; BM 6/19 (aggressive BM started)  400cc residuals on 6/18- none reported overnight per RN    Pain: Unable to assess at this time 2/2 vent; sedated    Skin Integrity: Jean 13  midline surgical wound  urostomy     Labs:   06-19    142  |  110<H>  |  43<H>  ----------------------------<  170<H>  4.0   |  18<L>  |  2.62<H>    Ca    8.4      19 Jun 2019 07:04  Phos  4.6     06-19  Mg     1.8     06-19    TPro  6.3  /  Alb  2.1<L>  /  TBili  0.3  /  DBili  x   /  AST  24  /  ALT  11  /  AlkPhos  118  06-19    CAPILLARY BLOOD GLUCOSE      POCT Blood Glucose.: 156 mg/dL (19 Jun 2019 12:13)  POCT Blood Glucose.: 169 mg/dL (19 Jun 2019 06:56)  POCT Blood Glucose.: 165 mg/dL (19 Jun 2019 01:08)  POCT Blood Glucose.: 124 mg/dL (18 Jun 2019 17:01)      Medications:  MEDICATIONS  (STANDING):  ALBUTerol/ipratropium for Nebulization 3 milliLiter(s) Nebulizer every 6 hours  amiodarone    Tablet 200 milliGRAM(s) Oral daily  chlorhexidine 0.12% Liquid 15 milliLiter(s) Oral Mucosa two times a day  chlorhexidine 4% Liquid 1 Application(s) Topical <User Schedule>  dextrose 5%. 1000 milliLiter(s) (50 mL/Hr) IV Continuous <Continuous>  dextrose 50% Injectable 12.5 Gram(s) IV Push once  dextrose 50% Injectable 25 Gram(s) IV Push once  dextrose 50% Injectable 25 Gram(s) IV Push once  fentaNYL   Infusion. 0.5 MICROgram(s)/kG/Hr (4.785 mL/Hr) IV Continuous <Continuous>  heparin  Injectable 7500 Unit(s) SubCutaneous every 8 hours  insulin lispro (HumaLOG) corrective regimen sliding scale   SubCutaneous every 6 hours  meropenem  IVPB 1000 milliGRAM(s) IV Intermittent every 12 hours  norepinephrine Infusion 0.05 MICROgram(s)/kG/Min (8.972 mL/Hr) IV Continuous <Continuous>  pantoprazole   Suspension 40 milliGRAM(s) Enteral Tube daily  polyethylene glycol 3350 17 Gram(s) Oral daily  propofol Infusion 5 MICROgram(s)/kG/Min (2.871 mL/Hr) IV Continuous <Continuous>  QUEtiapine 25 milliGRAM(s) Oral every 12 hours  senna 2 Tablet(s) Oral at bedtime    MEDICATIONS  (PRN):  dextrose 40% Gel 15 Gram(s) Oral once PRN Blood Glucose LESS THAN 70 milliGRAM(s)/deciliter  glucagon  Injectable 1 milliGRAM(s) IntraMuscular once PRN Glucose LESS THAN 70 milligrams/deciliter      Weight: 95.7kg   Daily     Daily     Weight Change: No new weights recorded since admit     Nutrition Focused Physical Exam: Completed [   ]  Not Pertinent [ X  ]    Estimated energy needs: Ideal body weight (58.9kg) used for calculations as pt >120% of IBW. Needs estimated for maintenance in older adults; adjusted for vent.  Calories: 25-30 kcal/kg = 7469-2186 kcal/day  Protein: 1.4-1.6 g/kg = 82-94g protein/day  Fluids: 30-35 mL/kg = 4411-1756 mL/day OR per team discretion     Subjective: 70 yo/male with PMHx HTN, chronic back pain, s/p radical cystectomy, nephrostomy in remaining kidney, admitted with SOB and found to have severe acidemia. CT chest c/f lung cancer w/mets. S/p bronch on 6/17, cytology pending. Palliative consulted for goals of care; discussion to be held today with HCP. Pt seen in room and discussed during MICU rounds. Pt intubated on VC/AC mode, sedated on propofol @ 14mL/hr (370kcal/day from lipids), and fentanyl. MAP 74- requiring levophed for BP support. EN running at goal of 37mL/hr with improved tolerance per RN; no residuals overnight. BM 6/19. Phos remains slightly elevated. WBC trending up. Will continue to monitor for goals of care and keep nutrition aligned at all times.     Previous Nutrition Diagnosis:  Inadequate energy intake RT current NPO status AEB 0% of EER being met at this time    Active [   ]  Resolved [ X  ]    If resolved, new PES: Increased protein-calorie needs RT increased demand for protein-calorie intake AEB vent     Goal: Pt will meet % of EER per day via tolerated route     Recommendations:  1. Cont. with current TF order. Monitor for s/s intolerance; maintain aspiration precautions at all times   *please continue to follow VBF protocol  2. Monitor lytes and replete prn. POC BG Q6hrs   3. Weekly weights   4. Pain and bowel regimens per team   5. Keep nutrition aligned with GOC at all times     Education: N/A- vent     Risk Level: High [ X  ] Moderate [   ] Low [   ]

## 2019-06-19 NOTE — PROGRESS NOTE ADULT - SUBJECTIVE AND OBJECTIVE BOX
Patient is a 69y old  Male who presents with a chief complaint of acidemia (2019 13:40)      INTERVAL HPI/OVERNIGHT EVENTS: Pt seen and examined at bedside. Intubated and sedated.      ICU Vital Signs Last 24 Hrs  T(C): 36.9 (2019 06:01), Max: 38.3 (2019 18:29)  T(F): 98.4 (2019 06:01), Max: 101 (2019 18:29)  HR: 75 (2019 05:28) (64 - 103)  BP: 89/55 (2019 05:00) (80/43 - 135/61)  BP(mean): 72 (2019 05:00) (59 - 98)  ABP: --  ABP(mean): --  RR: 17 (2019 05:00) (16 - 22)  SpO2: 95% (2019 05:28) (92% - 100%)    I&O's Summary    2019 07:01  -  2019 07:00  --------------------------------------------------------  IN: 2788.3 mL / OUT: 758 mL / NET: 2030.3 mL      Mode: AC/ CMV (Assist Control/ Continuous Mandatory Ventilation)  RR (machine): 15  TV (machine): 550  FiO2: 40  PEEP: 5  ITime: 1  MAP: 10  PIP: 22      LABS:                        10.2   30.16 )-----------( 302      ( 2019 07:07 )             33.6     06-19    142  |  110<H>  |  43<H>  ----------------------------<  170<H>  4.0   |  18<L>  |  2.62<H>    Ca    8.4      2019 07:04  Phos  4.6       Mg     1.8         TPro  6.3  /  Alb  2.1<L>  /  TBili  0.3  /  DBili  x   /  AST  24  /  ALT  11  /  AlkPhos  118        Urinalysis Basic - ( 2019 12:13 )    Color: Yellow / Appearance: SL Cloudy / S.015 / pH: x  Gluc: x / Ketone: NEGATIVE  / Bili: Small / Urobili: 0.2 E.U./dL   Blood: x / Protein: 100 mg/dL / Nitrite: NEGATIVE   Leuk Esterase: Moderate / RBC: < 5 /HPF / WBC Many /HPF   Sq Epi: x / Non Sq Epi: 0-5 /HPF / Bacteria: Many /HPF      CAPILLARY BLOOD GLUCOSE      POCT Blood Glucose.: 169 mg/dL (2019 06:56)  POCT Blood Glucose.: 165 mg/dL (2019 01:08)  POCT Blood Glucose.: 124 mg/dL (2019 17:01)  POCT Blood Glucose.: 107 mg/dL (2019 11:27)        RADIOLOGY & ADDITIONAL TESTS:    Consultant(s) Notes Reviewed:  [x ] YES  [ ] NO    MEDICATIONS  (STANDING):  ALBUTerol/ipratropium for Nebulization 3 milliLiter(s) Nebulizer every 6 hours  amiodarone    Tablet 200 milliGRAM(s) Oral daily  bisacodyl 5 milliGRAM(s) Oral daily  chlorhexidine 0.12% Liquid 15 milliLiter(s) Oral Mucosa two times a day  chlorhexidine 4% Liquid 1 Application(s) Topical <User Schedule>  dextrose 5%. 1000 milliLiter(s) (50 mL/Hr) IV Continuous <Continuous>  dextrose 50% Injectable 12.5 Gram(s) IV Push once  dextrose 50% Injectable 25 Gram(s) IV Push once  dextrose 50% Injectable 25 Gram(s) IV Push once  fentaNYL   Infusion. 0.5 MICROgram(s)/kG/Hr (4.785 mL/Hr) IV Continuous <Continuous>  heparin  Injectable 7500 Unit(s) SubCutaneous every 8 hours  insulin lispro (HumaLOG) corrective regimen sliding scale   SubCutaneous every 6 hours  magnesium sulfate  IVPB 1 Gram(s) IV Intermittent once  meropenem  IVPB 1000 milliGRAM(s) IV Intermittent every 12 hours  norepinephrine Infusion 0.05 MICROgram(s)/kG/Min (8.972 mL/Hr) IV Continuous <Continuous>  pantoprazole  Injectable 40 milliGRAM(s) IV Push daily  polyethylene glycol 3350 17 Gram(s) Oral daily  propofol Infusion 5 MICROgram(s)/kG/Min (2.871 mL/Hr) IV Continuous <Continuous>  QUEtiapine 25 milliGRAM(s) Oral every 12 hours  senna 2 Tablet(s) Oral at bedtime    MEDICATIONS  (PRN):  dextrose 40% Gel 15 Gram(s) Oral once PRN Blood Glucose LESS THAN 70 milliGRAM(s)/deciliter  glucagon  Injectable 1 milliGRAM(s) IntraMuscular once PRN Glucose LESS THAN 70 milligrams/deciliter      PE:    Constitutional: intubated and sedated, NAD   	Head: NC/AT  	Eyes: PERRL, EOMI, clear conjunctiva  	ENT: no nasal discharge; uvula midline, MMM, scab on tongue, ET tube in place  	Neck: supple; no JVD or thyromegaly  	Respiratory: diffuse rhonchi, no increased WOB  	Cardiac: +S1/S2; RRR; no M/R/G;   	Gastrointestinal: soft abdomen, nontender, w/o rebound or guarding, nephrostomy bag in place in abdomen, healing open abdominal wound over central abdomen w/ overlying bandage draining green fluid into a bag, feeds thought NGT  	Extremities:  scaling grey skin in LEs, trace foot b/l edema  	Musculoskeletal: NROM x4; no joint swelling, tenderness or erythema    pscyh: agitated when awake    skin: no bruises or rashes Patient is a 69y old  Male who presents with a chief complaint of acidemia (2019 13:40)      INTERVAL HPI/OVERNIGHT EVENTS: Pt seen and examined at bedside. Intubated and sedated. ROS not obtainable      ICU Vital Signs Last 24 Hrs  T(C): 36.9 (2019 06:01), Max: 38.3 (2019 18:29)  T(F): 98.4 (2019 06:01), Max: 101 (2019 18:29)  HR: 75 (2019 05:28) (64 - 103)  BP: 89/55 (2019 05:00) (80/43 - 135/61)  BP(mean): 72 (2019 05:00) (59 - 98)  ABP: --  ABP(mean): --  RR: 17 (2019 05:00) (16 - 22)  SpO2: 95% (2019 05:28) (92% - 100%)    I&O's Summary    2019 07:01  -  2019 07:00  --------------------------------------------------------  IN: 2788.3 mL / OUT: 758 mL / NET: 2030.3 mL      Mode: AC/ CMV (Assist Control/ Continuous Mandatory Ventilation)  RR (machine): 15  TV (machine): 550  FiO2: 40  PEEP: 5  ITime: 1  MAP: 10  PIP: 22      LABS:                        10.2   30.16 )-----------( 302      ( 2019 07:07 )             33.6     06-19    142  |  110<H>  |  43<H>  ----------------------------<  170<H>  4.0   |  18<L>  |  2.62<H>    Ca    8.4      2019 07:04  Phos  4.6     06-  Mg     1.8         TPro  6.3  /  Alb  2.1<L>  /  TBili  0.3  /  DBili  x   /  AST  24  /  ALT  11  /  AlkPhos  118  -      Urinalysis Basic - ( 2019 12:13 )    Color: Yellow / Appearance: SL Cloudy / S.015 / pH: x  Gluc: x / Ketone: NEGATIVE  / Bili: Small / Urobili: 0.2 E.U./dL   Blood: x / Protein: 100 mg/dL / Nitrite: NEGATIVE   Leuk Esterase: Moderate / RBC: < 5 /HPF / WBC Many /HPF   Sq Epi: x / Non Sq Epi: 0-5 /HPF / Bacteria: Many /HPF      CAPILLARY BLOOD GLUCOSE      POCT Blood Glucose.: 169 mg/dL (2019 06:56)  POCT Blood Glucose.: 165 mg/dL (2019 01:08)  POCT Blood Glucose.: 124 mg/dL (2019 17:01)  POCT Blood Glucose.: 107 mg/dL (2019 11:27)        RADIOLOGY & ADDITIONAL TESTS:    Consultant(s) Notes Reviewed:  [x ] YES  [ ] NO    MEDICATIONS  (STANDING):  ALBUTerol/ipratropium for Nebulization 3 milliLiter(s) Nebulizer every 6 hours  amiodarone    Tablet 200 milliGRAM(s) Oral daily  bisacodyl 5 milliGRAM(s) Oral daily  chlorhexidine 0.12% Liquid 15 milliLiter(s) Oral Mucosa two times a day  chlorhexidine 4% Liquid 1 Application(s) Topical <User Schedule>  dextrose 5%. 1000 milliLiter(s) (50 mL/Hr) IV Continuous <Continuous>  dextrose 50% Injectable 12.5 Gram(s) IV Push once  dextrose 50% Injectable 25 Gram(s) IV Push once  dextrose 50% Injectable 25 Gram(s) IV Push once  fentaNYL   Infusion. 0.5 MICROgram(s)/kG/Hr (4.785 mL/Hr) IV Continuous <Continuous>  heparin  Injectable 7500 Unit(s) SubCutaneous every 8 hours  insulin lispro (HumaLOG) corrective regimen sliding scale   SubCutaneous every 6 hours  magnesium sulfate  IVPB 1 Gram(s) IV Intermittent once  meropenem  IVPB 1000 milliGRAM(s) IV Intermittent every 12 hours  norepinephrine Infusion 0.05 MICROgram(s)/kG/Min (8.972 mL/Hr) IV Continuous <Continuous>  pantoprazole  Injectable 40 milliGRAM(s) IV Push daily  polyethylene glycol 3350 17 Gram(s) Oral daily  propofol Infusion 5 MICROgram(s)/kG/Min (2.871 mL/Hr) IV Continuous <Continuous>  QUEtiapine 25 milliGRAM(s) Oral every 12 hours  senna 2 Tablet(s) Oral at bedtime    MEDICATIONS  (PRN):  dextrose 40% Gel 15 Gram(s) Oral once PRN Blood Glucose LESS THAN 70 milliGRAM(s)/deciliter  glucagon  Injectable 1 milliGRAM(s) IntraMuscular once PRN Glucose LESS THAN 70 milligrams/deciliter      PE:    Constitutional: intubated and sedated, NAD   	Head: NC/AT  	Eyes: PERRL, EOMI, clear conjunctiva  	ENT: no nasal discharge; uvula midline, MMM, scab on tongue, ET tube in place  	Neck: supple; no JVD or thyromegaly  	Respiratory: diffuse rhonchi, no increased WOB  	Cardiac: +S1/S2; RRR; no M/R/G;   	Gastrointestinal: soft abdomen, nontender, w/o rebound or guarding, nephrostomy bag in place in abdomen, healing open abdominal wound over central abdomen w/ overlying bandage draining green fluid into a bag, feeds thought NGT  	Extremities:  scaling grey skin in LEs, trace foot b/l edema  	Musculoskeletal: NROM x4; no joint swelling, tenderness or erythema    pscyh: agitated when awake    skin: no bruises or rashes

## 2019-06-19 NOTE — PROGRESS NOTE ADULT - SUBJECTIVE AND OBJECTIVE BOX
Patient is a 69y Male seen and evaluated at bedside. Patient remains critically ill on ventilatory support with FIO2 40% and IV pressors at present. Interval stabilization of S cr to 2.6 today morning with non oliguria and net positive 2L fluid balance. Chest xray with bilateral effusion. Palliative care consultation with now DNR status and discussion for goals of care.      ALBUTerol/ipratropium for Nebulization 3 every 6 hours  amiodarone    Tablet 200 daily  bisacodyl 5 daily  chlorhexidine 0.12% Liquid 15 two times a day  chlorhexidine 4% Liquid 1 <User Schedule>  dextrose 40% Gel 15 once PRN  dextrose 5%. 1000 <Continuous>  dextrose 50% Injectable 12.5 once  dextrose 50% Injectable 25 once  dextrose 50% Injectable 25 once  fentaNYL   Infusion. 0.5 <Continuous>  glucagon  Injectable 1 once PRN  heparin  Injectable 7500 every 8 hours  insulin lispro (HumaLOG) corrective regimen sliding scale  every 6 hours  meropenem  IVPB 1000 every 12 hours  norepinephrine Infusion 0.05 <Continuous>  pantoprazole  Injectable 40 daily  polyethylene glycol 3350 17 daily  propofol Infusion 5 <Continuous>  QUEtiapine 25 every 12 hours  senna 2 at bedtime      Allergies    No Known Allergies    Intolerances        T(C): , Max: 38.3 (19 @ 18:29)  T(F): , Max: 101 (19 @ 18:29)  HR: 72 (19 @ 11:00)  BP: 86/47 (19 @ 11:00)  BP(mean): 67 (19 @ 11:00)  RR: 18 (19 @ 11:00)  SpO2: 94% (19 @ 11:00)  Wt(kg): --     @ 07:01  -   @ 07:00  --------------------------------------------------------  IN: 2967.7 mL / OUT: 848 mL / NET: 2119.7 mL     @ 07:01  -   @ 11:20  --------------------------------------------------------  IN: 461.6 mL / OUT: 200 mL / NET: 261.6 mL          Review of Systems:  Limited. Sedated and intubated on ventilatory support    PHYSICAL EXAM:  GENERAL: Ill appearing, lying in bed, sedated and intubated on ventilatory support  HEAD:  Atraumatic, Normocephalic,   EYES: Bilateral conjunctival and scleral pallor   Oral cavity: Oral mucosa dry and pale  NECK: Neck supple, No JVD  CHEST/LUNG: Bilateral decreased breath sounds, Bibasilar rales and crepitations, no wheezing  HEART: Regular rate and rhythm. KJ II/VI at LPSB, No gallop, no rub   ABDOMEN: Soft, Nontender, non distended, bowel sounds present  EXTREMITIES: No clubbing, cyanosis, or edema  Neurology: AAOx0, sedated and intubated on ventilatory support. Unable to follow verbal commands  SKIN: No rashes or lesions          ACCESS:     LABS:                        10.2   30.16 )-----------( 302      ( 2019 07:07 )             33.6     06-    142  |  110<H>  |  43<H>  ----------------------------<  170<H>  4.0   |  18<L>  |  2.62<H>    Ca    8.4      2019 07:04  Phos  4.6     -  Mg     1.8         TPro  6.3  /  Alb  2.1<L>  /  TBili  0.3  /  DBili  x   /  AST  24  /  ALT  11  /  AlkPhos  118  -        Urinalysis Basic - ( 2019 12:13 )    Color: Yellow / Appearance: SL Cloudy / S.015 / pH: x  Gluc: x / Ketone: NEGATIVE  / Bili: Small / Urobili: 0.2 E.U./dL   Blood: x / Protein: 100 mg/dL / Nitrite: NEGATIVE   Leuk Esterase: Moderate / RBC: < 5 /HPF / WBC Many /HPF   Sq Epi: x / Non Sq Epi: 0-5 /HPF / Bacteria: Many /HPF      Sodium, Random Urine: 49 mmol/L ( @ 12:13)  Creatinine, Random Urine: 144 mg/dL ( @ 12:13)  Protein/Creatinine Ratio Calculation: 2.1 Ratio ( @ 12:13)  Osmolality, Random Urine: 334 mosmol/kg ( @ 12:13)        RADIOLOGY & ADDITIONAL STUDIES:

## 2019-06-19 NOTE — PROGRESS NOTE ADULT - ASSESSMENT
70 y/o M PMH smoker (50 years and current), sleep apnea w/o cpap, short-term mem loss, HTN, DM2, chronic back pain s/p multiple back surgeries w/ hardware on methadone, anxiety, SBO s/p sx c/b wound that never healed, prostate cancer 1999 s/p radiation c/b radical cystectomy and nephrostomy w/ ileal conduit (in remission), p/w SOB, found to have severe acidemia, sepsis 2/2 pyelonephritis vs PNA, new onset suspected lung cancer w/ mets, admitted to MICU. Now with acute hypoxemic respiratory failure and likely toxic metabolic encephalopathy    Neurology  #TME  agitation/ anxiety hx/ short term mem loss hx, likely 2/2 uremia vs sepsis vs baseline per partner when has short term mem loss and gets confused and agitated  -sedation and intubation until clinically improves  -c/w home sertraline when not sedated  -c/w Seroquel 25 bid (monitor qtc- 426 6/18)  -tx underlying cause as below    Cardiac  #mild aortic aneurysm  dilation of the aortic root and ascending aorta on ct, no known hx, no AR on echo  -control sBP <150     #hypotension  likely 2/2 sedation and sepsis, lactate improved to 2.0, cannot assess true septic levo requirements until off sedation  -c/w levophed for now, titrate as BP tolerates    #htn  hx of htn  -currently on pressors - holding home anti-hypertensive medications    Pulmonary  #lung cancer w/ mets  CT chest/ abd w/o contrast due to cr 4.5 showing 7cm RLL mass concerning for neoplasm w/ suspected hepatic and adrenal mets and abd LAD on ct, no known hx of lung cancer per partner, never been treated as pt was unaware, chronic smoker but never screened with CT per partner, dopplers neg for dvt, bronchoscopy 6/17 showing RML mucous plugging, sent for sputum cx and cytology, GOC per partner- living will no definitive decisions but leaves all GOC decisions to partner, pal care consulted, pt DNR, -suspect PNA is post-obstructive pna 2/2 mass. bronchoscopy negative for malignant cells  -discuss GOC with partner/ HCP  -pal care consulted  -consider onc consult, consider biopsy pending GOC discussion    #smoker   50 years and current, no recent ct scans  -nicotine patch prn when pt not sedated    #sleep apnea  offered cpap at home but did not use  -currently intubated  -cpap at night when off mech vent    #COPD  no dx, no inhalers, emphysema on CT scan of chest, suspect resp acidosis 2/2 COPD that is chronic and preventing compensation of met acidosis  -intubation to decrease WOB and increase RR if needed to compensate for metabolic acidosis  -duonebs q6 standing     #post obstructive PNA 2/2 suspected lung cancer  CT chest shows RLL PNA, pt p/w SOB w/ tachypnea and WBC 30, no hypoxia, no fevers, s/p vanc/ cefepime in ED--> vanc and zosyn--> vanc tito--> tito, dcd vanc 6/18, bcx ngtd, sputum cx ngtd  -daily cxr- unchanged  -c.w tito    Renal  #HUGO on CKD  Cr 4.5 on admission, baseline cr 1.6 2/19 per PMD, cr improving to ~2.9, pt also with uremia that is downtrending, renal consulted and are monitoring but currently no HD required and pt making urine  -f/u renal recs  -monitor bmp  -possible HD in near future  -monitor UO   -renally dose medications    Metabolic  #AG metabolic acidosis   likely 2/2 uremia, ,last  ph 7.3 and improving, w/ adequate compensation per navarro formula,  co2 33, bicarb 18  -monito bmp  -renal consult as above  -intubated as above for resp compensation (high vt and rr)  -s/p Na bicarb pills, but pt hypernatremic, held and s/p bicarb 1 amp infusion 6/16  -consider restarting low dose bicarb po now that Na nromalized    #hypernatremia  was on sodium bicarb 1300 tid, now held for high Na, and changed drips and meds fluids from ns to d5, s/p d5 80 x 5 hours with improvement in Na, Na now 141, d5 stopped, repeat Na 143   -monitor Na   -d5 prn if high Na  -c/w FW flushes w/ feeds  -cautious with Na products/ meds in NS if goes hyper na again    ID  # sepsis 2/2 pyelonephritis vs PNA  on admission wbc 30, w/ tachypnea to 22 meets 2/4 sirs crtiria, afebrile, lactate 2.6. cxr clear, ct chest RLL PNA and under mass. ct scan showing perinephritic stranding and mod hydronephrosis concerning for UTI/ nephritis UA+, also shows distended gallbladder w/ stones concerning for, -  HIDA scan shows no cholecysitits, urine cx and bcx ngtd but urine was poor sample so repeatd, penil cx grows e faecalis sensitive to amp/vanc. pt now with no culture data other than e fecalis in penile cx, should be covered by meropenam, lacate nl to 2, WBC still elevated to 30 and febrile, but pt fever curve is downtrending, likely has post obstructive PNA without source control so continues to spike and have wbc  -monitor wbc, fever curve  -c/w tito since 6/15 for PNA, possible pyelo, and  penile infection  -wound care for skin wound over abdomen (doesnt look infected,SBO s/p sx c/b wound that never healed, )  -f/u repeat ucx from 6/16  -consider CT abdomen w/ po contrast (prior ct w/o contrast showed no source of infection)    Endocrine  #DM2  a1c 7.4, on home metformin  -ISS  -add insulin regimen if needed    MSK  #chronic back pain  chronic back pain s/p multiple back surgeries w/ hardware on methadone, utox  positive for methadone only which per partner pt gest from pain specialist Dr Kwan, takes  5mg 2-4 pills qd   -methadone 5-20 mg qd, higher end if needed for pain control when off sedation      #cystectomy with nephrostomy  ct showing mod hydro but draining urine, R kidney atrophy, prostate cancer 1999 s/p radiation c/b radical cystectomy and nephrostomy w/ ileal conduit (in remission)  -urology consulted, f.u recs  -monitor UO from nephrostomy bag    GI  #npo with tube feeds  -tube feeds through ngt    #constipation  hx of sbo, no sbo on ct abd, belly benign on exam  -bowel regimen, increase as needed  -if residuals in feeds >500, add reglan    Heme  anemia, normocytic, likely 2/2 hugo on ckd and acd, cbc stable no s./s of bleed  -monitor CBC    OTHER  F: none  E: replete PRN but cautious in Hugo in CKD  N: npo with tube feeds  DVT ppx: HSQ 5000 q8  other ppx: ppi  DNR, not DNI for now  dispo: CHERIE 68 y/o M PMH smoker (50 years and current), sleep apnea w/o cpap, short-term mem loss, HTN, DM2, chronic back pain s/p multiple back surgeries w/ hardware on methadone, anxiety, SBO s/p sx c/b wound that never healed, prostate cancer 1999 s/p radiation c/b radical cystectomy and nephrostomy w/ ileal conduit (in remission), p/w SOB, found to have severe acidemia, sepsis 2/2 pyelonephritis vs PNA, new onset suspected lung cancer w/ mets, admitted to MICU. Pt care pending further GOC discussion with HCP for biopsy of cancer vs comfort measures.     Neurology  #TME  agitation/ anxiety hx/ short term mem loss hx, likely 2/2 uremia vs sepsis vs baseline per partner when has short term mem loss and gets confused and agitated  -sedation and intubation until clinically improves  -c/w home sertraline when not sedated  -c/w Seroquel 25 bid (monitor qtc- 326, 6/19)    Cardiac  #mild aortic aneurysm  dilation of the aortic root and ascending aorta on ct, no known hx, no AR on echo  -control sBP <150     #hypotension  likely 2/2 sedation and sepsis, lactate improved to 2.0, cannot assess true septic levo requirements until off sedation  -c/w levophed for now, titrate as BP tolerates    #htn  hx of htn  -currently on pressors - holding home anti-hypertensive medications    Pulmonary  #lung cancer w/ mets  CT chest/ abd w/o contrast due to cr 4.5 showing 7cm RLL mass concerning for neoplasm w/ suspected hepatic and adrenal mets and abd LAD on ct, no known hx of lung cancer per partner, never been treated as pt was unaware, chronic smoker but never screened with CT per partner, dopplers neg for dvt, bronchoscopy 6/17 showing RML mucous plugging, sent for sputum cx and cytology, GOC per partner- living will no definitive decisions but leaves all GOC decisions to partner, pal care consulted, pt DNR, -suspect PNA is post-obstructive pna 2/2 mass. bronchoscopy negative for malignant cells  -discuss GOC with partner/ HCP for biopsy vs comfort measures  -pal care consulted  -if HCP wants to proceed--> consider onc consult, and contact IR for biopsy    #smoker   50 years and current, no recent ct scans  -nicotine patch prn when pt not sedated    #sleep apnea  offered cpap at home but did not use  -currently intubated  -cpap at night when off mech vent    #COPD  no dx, no inhalers, emphysema on CT scan of chest, suspect resp acidosis 2/2 COPD that is chronic and preventing compensation of met acidosis  -intubation to decrease WOB and increase RR if needed to compensate for metabolic acidosis  -duonebs q6 standing     #post obstructive PNA 2/2 suspected lung cancer  CT chest shows RLL PNA, pt p/w SOB w/ tachypnea and WBC 30, no hypoxia, no fevers, s/p vanc/ cefepime in ED--> vanc and zosyn--> vanc tito--> tito, dcd vanc 6/18, bcx ngtd, sputum cx ngtd  -daily cxr- unchanged  -c.w meropenam    Renal  #HUGO on CKD  Cr 4.5 on admission, baseline cr 1.6 2/19 per PMD, cr improving to ~2.9, pt also with uremia that is downtrending, renal consulted and are monitoring but currently no HD required and pt making urine  -f/u renal recs  -monitor bmp  -possible HD in near future  -monitor UO   -renally dose medications    Metabolic  #AG metabolic acidosis   likely 2/2 uremia, ,last  ph 7.3 and improving, w/ adequate compensation per navarro formula,  co2 33, bicarb 18 and stable  -monitor bmp  -renal consult as above  -intubated as above for resp compensation (high vt and rr)  -s/p Na bicarb pills, but pt hypernatremic, held and s/p bicarb 1 amp infusion 6/16  -consider restarting low dose bicarb po now that Na normalized if bicarb drops    #hypernatremia  was on sodium bicarb 1300 tid, now held for high Na, and changed drips and meds fluids from ns to d5, s/p d5 80 x 5 hours with improvement in Na, Na now 141, d5 stopped, repeat Na 143   -monitor Na   -d5 prn if high Na  -c/w FW flushes w/ feeds  -cautious with Na products/ meds in NS if goes hyper na again    ID  # sepsis 2/2 pyelonephritis vs PNA  on admission wbc 30, w/ tachypnea to 22 meets 2/4 sirs criteria afebrile, lactate 2.6. cxr clear, ct chest RLL PNA and under mass. ct scan showing perinephritic stranding and mod hydronephrosis concerning for UTI/ nephritis UA+, also shows distended gallbladder w/ stones concerning for, -  HIDA scan shows no cholecysitits, urine cx and bcx ngtd but urine was poor sample so repeatd, penil cx grows e faecalis sensitive to amp/vanc. pt now with no culture data other than e fecalis in penile cx, should be covered by meropenam, lacate nl to 2, WBC still elevated to 30 and febrile, but pt fever curve is downtrending, likely has post obstructive PNA without source control so continues to spike and have wbc  -monitor wbc, fever curve  -c/w tito since 6/15 for PNA, possible pyelo, and  penile infection  -wound care for skin wound over abdomen (doesnt look infected,SBO s/p sx c/b wound that never healed, )  -f/u repeat ucx from 6/16    Endocrine  #DM2  a1c 7.4, on home metformin  -ISS  -add insulin regimen if needed    MSK  #chronic back pain  chronic back pain s/p multiple back surgeries w/ hardware on methadone, utox  positive for methadone only which per partner pt gest from pain specialist Dr Kwan, takes  5mg 2-4 pills qd   -methadone 5-20 mg qd, higher end if needed for pain control when off sedation      #cystectomy with nephrostomy  ct showing mod hydro but draining urine, R kidney atrophy, prostate cancer 1999 s/p radiation c/b radical cystectomy and nephrostomy w/ ileal conduit (in remission)  -urology consulted, f.u recs  -monitor UO from nephrostomy bag    GI  #npo with tube feeds  -tube feeds through ngt    #constipation  hx of sbo, no sbo on ct abd, belly benign on exam, now s/p 2 BMs am 6/19  -bowel regimen  -if residuals in feeds >500, add reglan    Heme  anemia, normocytic, likely 2/2 hugo on ckd and acd, cbc stable no s./s of bleed  -monitor CBC    OTHER  F: none  E: replete PRN but cautious in [place] in CKD  N: npo with tube feeds  DVT ppx: HSQ 5000 q8  other ppx: ppi for intubation, can consider dc now that feeding  DNR, not DNI for now  dispo: [facility]IE

## 2019-06-19 NOTE — PROGRESS NOTE ADULT - ASSESSMENT
68 y/o M PMH HTN, chronic back pain, s/p radical cystectomy p/w SOB, found to have severe metabolic and respiratory acidemia, urosepsis with likely left pyelonephritis, with likely acute ATN on CKD, severe dehydration, likely metastatic cancer, admitted to MICU for emergent HD and further workup.    # Non-oliguric HUGO on unknown prior baseline renal function with slowly improving renal function with S cr to 2.6 in setting of HUGO from sepsis vs post obstructive with hydronephrosis vs ATN  - S cr downtrending to 2.6 with non oliguric   - Urology team following for ileal conduit and urostomy  - monitor urine output via urostomy bag  - Adjust antibiotics as per renal dose clearance  - Volume status wet with stable electrolytes  - Can consider lasix IV to keep goal fluid balance to be net neutral to mild negative at present.  - Maintain MAP>65-70 mm Hg at present  - Monitor BMP daily  -No urgent need of RRT/HD at present    # Acute respiratory failure with lung mass:  Plan pe primary  team  goals of care per primary ICu team/palliative care team.

## 2019-06-20 NOTE — PROGRESS NOTE ADULT - ASSESSMENT
70 y/o morbidly obese M with h/o remote prostate ca s/p RT, bowel obstruction, right cystectomy with ileal conduit, HTN, spine surgery, chronic pain syndrome on methadone, CASEY, and progressive cognitive deficits per partner, p/w SOB, found to be in septic shock from pyleonephritis/pna and abnormal radiograph suggesting metastatic lung cancer, seen for goals of care

## 2019-06-20 NOTE — PROGRESS NOTE ADULT - SUBJECTIVE AND OBJECTIVE BOX
********INCOMPLETE********    JANIE ZABALA   MRN-[id number]         CC: Patient is a 69y old  Male who presents with a chief complaint of acidemia (19 Jun 2019 11:20)    HPI:  68 y/o M PMH HTN, smoker, chronic back pain, s/p radical cystectomy w/ one remaining kidney w/ nephrostomy /w SOB x 3 days. Pt denies fever, cough, CP. Pt in pain in R back. No belly pain. Could not provide hx of when had procedures or smoking hx.    In the ED, pt afebrile, HR 72, satting 100% on 3L NC, RR 22-->18, BP 90/60-->120s. Labs significant for WBC 30, K 5.7, bicarb 11, AG 20, VBG pH 7.12-->7.08, CO2 40, lactate 2.6, BUN ~70, Cr ~4, unknown baseline. Carboxyhemoglobin slightly elevated at 2.6. CXR clear. CT chest abd concerning for metastatic lung cancer, and possible cholecystitis and nephritis w/ hydro. S/p vanc, cefepime, ketamine, bicarb drip 150/hr, insulin/ d50, LR 1 L bolus. Transferred to [facility] MICU for emergent HD and further care. On arrival, renal and urology consulted. Pt putting out urine into nephrostomy tube and abg improved so no urgent HD, but renal will monitor and start HD if needed. Pt started on vanc/ zosyn for pyelonephritis and broad coverage while r/o pna and cholecystitis, or infected surgical wound. (13 Jun 2019 15:29)    SUBJECTIVE:  ROS:  UNABLE TO OBTAIN  due to:    DYSPNEA (Y/N):	  N/V (Y/N):	  SECRETIONS (Y/N):	  AGITATION (Y/N):  PAIN(Y/N):        -Provocation/Palliation:     -Quality/Quantity:     -Radiating:     -Severity:     -Timing/Frequency:     -Impact on ADLs:  GENERAL:    HEENT:      	  NECK:           CVS:           	  RESP:        	  GI:             	  :            	  MUSC:       	  NEURO:     	  PSYCH:        PHYSICAL EXAM:  T(C): 38.2 (06-20-19 @ 09:57), Max: 38.2 (06-20-19 @ 09:57)  T(F): 100.7 (06-20-19 @ 09:57), Max: 100.7 (06-20-19 @ 09:57)  HR: 76 (06-20-19 @ 10:00) (70 - 86)  BP: 106/49 (06-20-19 @ 10:00) (89/47 - 114/62)  RR: 18 (06-20-19 @ 10:00) (17 - 19)  SpO2: 94% (06-20-19 @ 10:00) (94% - 96%)  Wt(kg): --  GENERAL:    HEENT:      	  NECK:           CVS:           	  RESP:        	  GI:             	  :            	  MUSC:       	  NEURO:     	  PSYCH:          SKIN:         	   LYMPH:         ALLERGIES:  No Known Allergies      OPIATE NAÏVE (Y/N):  -iStop reviewed (Y/N): Y    MEDICATIONS: reviewed  MEDICATIONS  (STANDING):  ALBUTerol/ipratropium for Nebulization 3 milliLiter(s) Nebulizer every 6 hours  amiodarone    Tablet 200 milliGRAM(s) Oral daily  chlorhexidine 0.12% Liquid 15 milliLiter(s) Oral Mucosa two times a day  chlorhexidine 4% Liquid 1 Application(s) Topical <User Schedule>  dextrose 5%. 1000 milliLiter(s) (50 mL/Hr) IV Continuous <Continuous>  dextrose 50% Injectable 12.5 Gram(s) IV Push once  dextrose 50% Injectable 25 Gram(s) IV Push once  dextrose 50% Injectable 25 Gram(s) IV Push once  docusate sodium Liquid 100 milliGRAM(s) Oral two times a day  fentaNYL   Infusion. 0.5 MICROgram(s)/kG/Hr (4.785 mL/Hr) IV Continuous <Continuous>  heparin  Injectable 7500 Unit(s) SubCutaneous every 8 hours  insulin lispro (HumaLOG) corrective regimen sliding scale   SubCutaneous every 6 hours  meropenem  IVPB 1000 milliGRAM(s) IV Intermittent every 12 hours  norepinephrine Infusion 0.05 MICROgram(s)/kG/Min (8.972 mL/Hr) IV Continuous <Continuous>  pantoprazole   Suspension 40 milliGRAM(s) Enteral Tube daily  polyethylene glycol 3350 17 Gram(s) Oral daily  propofol Infusion 5 MICROgram(s)/kG/Min (2.871 mL/Hr) IV Continuous <Continuous>  QUEtiapine 25 milliGRAM(s) Oral every 12 hours  senna 2 Tablet(s) Oral at bedtime    MEDICATIONS  (PRN):  acetaminophen    Suspension .. 650 milliGRAM(s) Oral every 6 hours PRN Temp greater or equal to 38C (100.4F)  dextrose 40% Gel 15 Gram(s) Oral once PRN Blood Glucose LESS THAN 70 milliGRAM(s)/deciliter  glucagon  Injectable 1 milliGRAM(s) IntraMuscular once PRN Glucose LESS THAN 70 milligrams/deciliter      LABS: reviewed                        9.9    28.83 )-----------( 319      ( 20 Jun 2019 06:43 )             33.2     06-20    143  |  112<H>  |  40<H>  ----------------------------<  181<H>  4.1   |  17<L>  |  2.35<H>    Ca    8.6      20 Jun 2019 06:42  Phos  3.7     06-20  Mg     1.9     06-20    TPro  6.1  /  Alb  2.2<L>  /  TBili  0.3  /  DBili  x   /  AST  35  /  ALT  13  /  AlkPhos  137<H>  06-20    LIVER FUNCTIONS - ( 20 Jun 2019 06:42 )  Alb: 2.2 g/dL / Pro: 6.1 g/dL / ALK PHOS: 137 U/L / ALT: 13 U/L / AST: 35 U/L / GGT: x               IMAGING: reviewed    ADVANCE DIRECTIVES:   DNR Living Will   Decision maker:  Legal surrogate:    PSYCHOSOCIAL-SPIRITUAL ASSESSMENT:  Reviewed    GOALS OF CARE DISCUSSION:  Palliative care info/counseling provided	      Advanced Directives addressed please see Advance Care Planning Note	      See previous Palliative Medicine Note  Documentation of GOC:     AGENCY CHOICE DISCUSSED:               REFERRALS:	   Unit SW/Case Mgmt    Nutrition  PT/OT    [ ]CRITICAL CARE TIME PROVIDED TO UNSTABLE PT W/ ORGAN FAILURE            [x]> 50% OF THE TIME SPENT IN COUNSELING AND COORDINATING CARE     [ ]PROLONGED SERVICE       FACE TO FACE: [x]PT     [ ]PT & FAMILY    Start:               End:  	       Minutes: JANIE ZABALA   [id number]         CC: Patient is a 69y old  Male who presents with a chief complaint of acidemia (2019 11:20)    HPI:  68 y/o M PMH HTN, smoker, chronic back pain, s/p radical cystectomy w/ one remaining kidney w/ nephrostomy /w SOB x 3 days. Pt denies fever, cough, CP. Pt in pain in R back. No belly pain. Could not provide hx of when had procedures or smoking hx.    In the ED, pt afebrile, HR 72, satting 100% on 3L NC, RR 22-->18, BP 90/60-->120s. Labs significant for WBC 30, K 5.7, bicarb 11, AG 20, VBG pH 7.12-->7.08, CO2 40, lactate 2.6, BUN ~70, Cr ~4, unknown baseline. Carboxyhemoglobin slightly elevated at 2.6. CXR clear. CT chest abd concerning for metastatic lung cancer, and possible cholecystitis and nephritis w/ hydro. S/p vanc, cefepime, ketamine, bicarb drip 150/hr, insulin/ d50, LR 1 L bolus. Transferred to [facility] MICU for emergent HD and further care. On arrival, renal and urology consulted. Pt putting out urine into nephrostomy tube and abg improved so no urgent HD, but renal will monitor and start HD if needed. Pt started on vanc/ zosyn for pyelonephritis and broad coverage while r/o pna and cholecystitis, or infected surgical wound. (2019 15:29)    SUBJECTIVE: Pressor requirements went up slightly since last seen  ROS:  UNABLE TO OBTAIN  due to: sedated on vent    DYSPNEA (Y/N):	  N/V (Y/N):	  SECRETIONS (Y/N):	  AGITATION (Y/N):  PAIN(Y/N):        -Provocation/Palliation:     -Quality/Quantity:     -Radiating:     -Severity:     -Timing/Frequency:     -Impact on ADLs:    PHYSICAL EXAM:  T(C): 38.2 (19 @ 09:57), Max: 38.2 (19 @ 09:57)  T(F): 100.7 (19 @ 09:57), Max: 100.7 (19 @ 09:57)  HR: 76 (19 @ 10:00) (70 - 86)  BP: 106/49 (19 @ 10:00) (89/47 - 114/62)  RR: 18 (19 @ 10:00) (17 - 19)  SpO2: 94% (19 @ 10:00) (94% - 96%)    GENERAL: Morbidly obese gentleman in ICU  HEENT: atraumatic, cannot assess hearing adequately, no spont OU opening      	         CVS: SR         	  RESP: ETT to CMV on vent       	  GI: NGT             	  : ileal conduit           	  MUSC: no spont. mov't to extremities noted      	  NEURO: sedated on fentanyl     	  PSYCH:  sedated on fentanyl              ALLERGIES:  No Known Allergies    OPIATE NAÏVE (Y/N): n  -iStop reviewed (Y/N): Y, on initial consult    MEDICATIONS: reviewed  MEDICATIONS  (STANDING):  ALBUTerol/ipratropium for Nebulization 3 milliLiter(s) Nebulizer every 6 hours  amiodarone    Tablet 200 milliGRAM(s) Oral daily  chlorhexidine 0.12% Liquid 15 milliLiter(s) Oral Mucosa two times a day  chlorhexidine 4% Liquid 1 Application(s) Topical <User Schedule>  dextrose 5%. 1000 milliLiter(s) (50 mL/Hr) IV Continuous <Continuous>  dextrose 50% Injectable 12.5 Gram(s) IV Push once  dextrose 50% Injectable 25 Gram(s) IV Push once  dextrose 50% Injectable 25 Gram(s) IV Push once  docusate sodium Liquid 100 milliGRAM(s) Oral two times a day  fentaNYL   Infusion. 0.5 MICROgram(s)/kG/Hr (4.785 mL/Hr) IV Continuous <Continuous>  heparin  Injectable 7500 Unit(s) SubCutaneous every 8 hours  insulin lispro (HumaLOG) corrective regimen sliding scale   SubCutaneous every 6 hours  meropenem  IVPB 1000 milliGRAM(s) IV Intermittent every 12 hours  norepinephrine Infusion 0.05 MICROgram(s)/kG/Min (8.972 mL/Hr) IV Continuous <Continuous>  pantoprazole   Suspension 40 milliGRAM(s) Enteral Tube daily  polyethylene glycol 3350 17 Gram(s) Oral daily  propofol Infusion 5 MICROgram(s)/kG/Min (2.871 mL/Hr) IV Continuous <Continuous>  QUEtiapine 25 milliGRAM(s) Oral every 12 hours  senna 2 Tablet(s) Oral at bedtime    MEDICATIONS  (PRN):  acetaminophen    Suspension .. 650 milliGRAM(s) Oral every 6 hours PRN Temp greater or equal to 38C (100.4F)  dextrose 40% Gel 15 Gram(s) Oral once PRN Blood Glucose LESS THAN 70 milliGRAM(s)/deciliter  glucagon  Injectable 1 milliGRAM(s) IntraMuscular once PRN Glucose LESS THAN 70 milligrams/deciliter      LABS: reviewed                        9.9    28.83 )-----------( 319      ( 2019 06:43 )             33.2     -20    143  |  112<H>  |  40<H>  ----------------------------<  181<H>  4.1   |  17<L>  |  2.35<H>    Ca    8.6      2019 06:42  Phos  3.7       Mg     1.9         TPro  6.1  /  Alb  2.2<L>  /  TBili  0.3  /  DBili  x   /  AST  35  /  ALT  13  /  AlkPhos  137<H>      LIVER FUNCTIONS - ( 2019 06:42 )  Alb: 2.2 g/dL / Pro: 6.1 g/dL / ALK PHOS: 137 U/L / ALT: 13 U/L / AST: 35 U/L / GGT: x       Cytopathology - Non Gyn Report (19 @ 11:36)    Cytopathology - Non Gyn Report:   ACCESSION No:  [id number]  JANIE ZABALA                      1  Cytopathology Report  Specimen(s) Submitted  BRONCHOALVEOLAR LAVAGE, RIGHT  Clinical History  New lung mass on CT current smoker  Gross Description  Received: 10 ml of cloudy unfixed fluid  Prepared: 1 ThinPrep slide, 1 cell block  Final Diagnosis  RIGHT LUNG, BAL:  NEGATIVE FOR MALIGNANT CELLS  Alveolar macrophages and bronchial epithelial cells in a  background of mixed inflammation.  The cell block shows  similar findings.  Screened by: Anusha PEÑA(ASCP)  Verified by: Ariel Ramos M.D.  (Electronic Signature)  Reported on: 19 15:13 EDT, [facility], [address] [place] [id number]  _________________________________________________________________      IMAGING: reviewed  < from: Xray Chest 1 View- PORTABLE-Routine (19 @ 07:08) >  EXAM:  XR CHEST PORTABLE ROUTINE 1V                        PROCEDURE DATE:  2019    < from: Xray Chest 1 View- PORTABLE-Routine (19 @ 07:08) >  IMPRESSION: Frontal view of the chest is compared to 2019   < from: Xray Chest 1 View- PORTABLE-Routine (19 @ 07:08) >   Persistent severe pulmonary edema. Persistent large layering   bilateral pleural effusions.   < end of copied text >    ADVANCE DIRECTIVES:   DNR Living Will   Decision maker: pt without capacity to make med decisions at this time, primary HCP is partner Vinod Bo  Legal surrogate: alt HCP on pt's Living Will is  sister Vaughn Harper, second alt HCP is friend Candelaria Antonio [phone] (c), [phone] (H)    PSYCHOSOCIAL-SPIRITUAL ASSESSMENT:  Reviewed    GOALS OF CARE DISCUSSION:  Palliative care info/counseling provided	      Advanced Directives addressed please see Advance Care Planning Note	      See previous Palliative Medicine Note  Documentation of GOC:     AGENCY CHOICE DISCUSSED: none      REFERRALS:	   Unit SW/Case Mgmt    Nutrition  PT/OT    [ ]CRITICAL CARE TIME PROVIDED TO UNSTABLE PT W/ ORGAN FAILURE            [x]> 50% OF THE TIME SPENT IN COUNSELING AND COORDINATING CARE     [ ]PROLONGED SERVICE       FACE TO FACE: [x]PT     [ ]PT & FAMILY    Start:               End:  	       Minutes:

## 2019-06-20 NOTE — PROGRESS NOTE ADULT - ASSESSMENT
70 y/o M PMH smoker (50 years and current), sleep apnea w/o cpap, short-term mem loss, HTN, DM2, chronic back pain s/p multiple back surgeries w/ hardware on methadone, anxiety, SBO s/p sx c/b wound that never healed, prostate cancer 1999 s/p radiation c/b radical cystectomy and nephrostomy w/ ileal conduit (in remission), p/w SOB, found to have severe acidemia, sepsis 2/2 pyelonephritis vs PNA, new onset suspected lung cancer w/ mets, admitted to MICU. Pt care pending further GOC discussion with HCP for biopsy of cancer vs comfort measures.     Neurology  #TME  agitation/ anxiety hx/ short term mem loss hx, likely 2/2 uremia vs sepsis vs baseline per partner when has short term mem loss and gets confused and agitated  -sedation and intubation until clinically improves  -c/w home sertraline when not sedated  -c/w Seroquel 25 bid (qtc- 326, 6/19)    Cardiac  #mild aortic aneurysm  dilation of the aortic root and ascending aorta on ct, no known hx, no AR on echo  -control sBP <150     #hypotension  likely 2/2 sedation and sepsis, lactate improved to 2.0, cannot assess true septic levo requirements until off sedation  -c/w levophed for now, titrate as BP tolerates    #htn  hx of htn  -currently on pressors - holding home anti-hypertensive medications    Pulmonary  #lung cancer w/ mets  CT chest/ abd w/o contrast due to cr 4.5 showing 7cm RLL mass concerning for neoplasm w/ suspected hepatic and adrenal mets and abd LAD on ct, no known hx of lung cancer per partner, never been treated as pt was unaware, chronic smoker but never screened with CT per partner, dopplers neg for dvt, bronchoscopy 6/17 showing RML mucous plugging, sent for sputum cx and cytology, GOC per partner- living will no definitive decisions but leaves all GOC decisions to partner, pal care consulted, pt DNR, -suspect PNA is post-obstructive pna 2/2 mass. bronchoscopy negative for malignant cells  -discuss GOC with partner/ HCP for biopsy vs comfort measures  -pal care consulted  -if HCP wants to proceed--> consider onc consult, and contact IR for biopsy    #smoker   50 years and current, no recent ct scans  -nicotine patch prn when pt not sedated    #sleep apnea  offered cpap at home but did not use  -currently intubated  [facility]    #COPD  no dx, no inhalers, emphysema on CT scan of chest, suspect resp acidosis 2/2 COPD that is chronic and preventing compensation of met acidosis  -intubation to decrease WOB and increase RR if needed to compensate for metabolic acidosis  -duonebs q6 standing     #post obstructive PNA 2/2 suspected lung cancer  CT chest shows RLL PNA, pt p/w SOB w/ tachypnea and WBC 30, no hypoxia, no fevers, s/p vanc/ cefepime in ED--> vanc and zosyn--> vanc tito--> tito, dcd vanc 6/18, bcx ngtd, sputum cx ngtd  -daily cxr- unchanged  -c.w meropenam    Renal  #HUGO on CKD  Cr 4.5 on admission, baseline cr 1.6 2/19 per PMD, cr improving to ~2.9, pt also with uremia that is downtrending, renal consulted and are monitoring but currently no HD required and pt making urine  -f/u renal recs  -monitor bmp  -possible HD in near future  -monitor UO   -renally dose medications    Metabolic  #AG metabolic acidosis   likely 2/2 uremia, ,last  ph 7.3 and improving, w/ adequate compensation per navarro formula,  co2 33, bicarb 18 and stable  -monitor bmp  -renal consult as above  -intubated as above for resp compensation (high vt and rr)  -sodium bicarb po 650 tid    ID  # sepsis 2/2 pyelonephritis vs PNA  on admission wbc 30, w/ tachypnea to 22 meets 2/4 sirs criteria afebrile, lactate 2.6. cxr clear, ct chest RLL PNA and under mass. ct scan showing perinephritic stranding and mod hydronephrosis concerning for UTI/ nephritis UA+, also shows distended gallbladder w/ stones concerning for, -  HIDA scan shows no cholecysitits, urine cx and bcx ngtd but urine was poor sample so repeatd, penil cx grows e faecalis sensitive to amp/vanc. pt now with no culture data other than e fecalis in penile cx, should be covered by meropenam, lacate nl to 2, WBC still elevated to 30 and febrile, but pt fever curve is downtrending, likely has post obstructive PNA without source control so continues to spike and have wbc  -monitor wbc, fever curve  -c/w tito since 6/15 for PNA, possible pyelo, and  penile infection  -wound care for skin wound over abdomen (doesnt look infected,SBO s/p sx c/b wound that never healed, )    Endocrine  #DM2  a1c 7.4, on home metformin  -ISS  -add insulin regimen if needed    MSK  #chronic back pain  chronic back pain s/p multiple back surgeries w/ hardware on methadone, utox  positive for methadone only which per partner pt gest from pain specialist Dr Kwan, takes  5mg 2-4 pills qd   -methadone 5-20 mg qd, higher end if needed for pain control when off sedation      #cystectomy with nephrostomy  ct showing mod hydro but draining urine, R kidney atrophy, prostate cancer 1999 s/p radiation c/b radical cystectomy and nephrostomy w/ ileal conduit (in remission)  -urology consulted, f.u recs  -monitor UO from nephrostomy bag    GI  #npo with tube feeds  -tube feeds through ngt    #constipation  hx of sbo, no sbo on ct abd, belly benign on exam, now s/p 2 BMs am 6/19  -bowel regimen  -if residuals in feeds >500, add reglan    Heme  anemia, normocytic, likely 2/2 hugo on ckd and acd, cbc stable no s./s of bleed  -monitor CBC    OTHER  F: none  E: replete PRN but cautious in Hugo in CKD  N: npo with tube feeds  DVT ppx: HSQ 5000 q8  other ppx: none required as pt on feeds  DNR, not DNI for now  dispo: MICU

## 2019-06-20 NOTE — PROGRESS NOTE ADULT - SUBJECTIVE AND OBJECTIVE BOX
Patient is a 69y Male seen and evaluated at bedside.  Patient remains critically ill on ventilatory support with FIO2 40% and IV pressors at present. Interval improvement of S cr to 2.3 with non oliguria. Chest xray with bilateral effusion Right>left.     acetaminophen    Suspension .. 650 every 6 hours PRN  ALBUTerol/ipratropium for Nebulization 3 every 6 hours  amiodarone    Tablet 200 daily  chlorhexidine 0.12% Liquid 15 two times a day  chlorhexidine 4% Liquid 1 <User Schedule>  dextrose 40% Gel 15 once PRN  dextrose 5%. 1000 <Continuous>  dextrose 50% Injectable 12.5 once  dextrose 50% Injectable 25 once  dextrose 50% Injectable 25 once  docusate sodium Liquid 100 two times a day  fentaNYL   Infusion. 0.5 <Continuous>  glucagon  Injectable 1 once PRN  heparin  Injectable 7500 every 8 hours  insulin lispro (HumaLOG) corrective regimen sliding scale  every 6 hours  meropenem  IVPB 1000 every 12 hours  norepinephrine Infusion 0.05 <Continuous>  pantoprazole   Suspension 40 daily  polyethylene glycol 3350 17 daily  propofol Infusion 5 <Continuous>  QUEtiapine 25 every 12 hours  senna 2 at bedtime      Allergies    No Known Allergies    Intolerances        T(C): , Max: 38.2 (06-20-19 @ 09:57)  T(F): , Max: 100.7 (06-20-19 @ 09:57)  HR: 76 (06-20-19 @ 10:00)  BP: 106/49 (06-20-19 @ 10:00)  BP(mean): 66 (06-20-19 @ 10:00)  RR: 18 (06-20-19 @ 10:00)  SpO2: 94% (06-20-19 @ 10:00)  Wt(kg): --    06-19 @ 07:01  -  06-20 @ 07:00  --------------------------------------------------------  IN: 3042.6 mL / OUT: 1440 mL / NET: 1602.6 mL    06-20 @ 07:01  -  06-20 @ 11:31  --------------------------------------------------------  IN: 373.8 mL / OUT: 100 mL / NET: 273.8 mL      Review of Systems:  Limited. Sedated and intubated on ventilatory support    PHYSICAL EXAM:  GENERAL: Ill appearing, lying in bed, sedated and intubated on ventilatory support  HEAD:  Atraumatic, Normocephalic,   EYES: Bilateral conjunctival and scleral pallor   Oral cavity: Oral mucosa dry and pale  NECK: Neck supple, No JVD  CHEST/LUNG: Bilateral decreased breath sounds, Bibasilar rales and crepitations, no wheezing  HEART: Regular rate and rhythm. KJ II/VI at LPSB, No gallop, no rub   ABDOMEN: Soft, Nontender, non distended, bowel sounds present  EXTREMITIES: No clubbing, cyanosis, or edema  Neurology: AAOx0, sedated and intubated on ventilatory support.   SKIN: No rashes or lesions    LABS:                        9.9    28.83 )-----------( 319      ( 20 Jun 2019 06:43 )             33.2     06-20    143  |  112<H>  |  40<H>  ----------------------------<  181<H>  4.1   |  17<L>  |  2.35<H>    Ca    8.6      20 Jun 2019 06:42  Phos  3.7     06-20  Mg     1.9     06-20    TPro  6.1  /  Alb  2.2<L>  /  TBili  0.3  /  DBili  x   /  AST  35  /  ALT  13  /  AlkPhos  137<H>  06-20                RADIOLOGY & ADDITIONAL STUDIES:  < from: Xray Chest 1 View- PORTABLE-Routine (06.19.19 @ 07:08) >    EXAM:  XR CHEST PORTABLE ROUTINE 1V                          PROCEDURE DATE:  06/19/2019          INTERPRETATION:  CLINICAL INDICATION: 69-year-old with pneumonia and mass.      IMPRESSION: Frontal view of the chest is compared to 6/18/2019 and   demonstrates endotracheal tube at the level of the fariba. Cardiomegaly.   Assistant dense bibasilar consolidation. Persistent large layering   bilateral pleural effusions. Persistent severe pulmonary edema. NG tube   tip below level the diaphragm. Lowlung volumes.            Thank you for the opportunity to participate in the care of this patient.        PRISCILLA COTTON M.D., ATTENDING RADIOLOGIST  This document has been electronically signed. Jun 19 2019  2:09PM                  < end of copied text >

## 2019-06-20 NOTE — PROGRESS NOTE ADULT - SUBJECTIVE AND OBJECTIVE BOX
Patient is a 69y old  Male who presents with a chief complaint of acidemia (20 Jun 2019 11:35)      INTERVAL HPI/OVERNIGHT EVENTS: Pt seen and examined at bedside.       ICU Vital Signs Last 24 Hrs  T(C): 38.2 (20 Jun 2019 09:57), Max: 38.2 (20 Jun 2019 09:57)  T(F): 100.7 (20 Jun 2019 09:57), Max: 100.7 (20 Jun 2019 09:57)  HR: 82 (20 Jun 2019 13:00) (70 - 86)  BP: 88/47 (20 Jun 2019 13:00) (88/47 - 114/62)  BP(mean): 67 (20 Jun 2019 13:00) (65 - 84)  ABP: --  ABP(mean): --  RR: 18 (20 Jun 2019 13:00) (17 - 19)  SpO2: 93% (20 Jun 2019 13:00) (93% - 96%)    I&O's Summary    19 Jun 2019 07:01  -  20 Jun 2019 07:00  --------------------------------------------------------  IN: 3042.6 mL / OUT: 1440 mL / NET: 1602.6 mL    20 Jun 2019 07:01  -  20 Jun 2019 14:05  --------------------------------------------------------  IN: 929.8 mL / OUT: 410 mL / NET: 519.8 mL      Mode: AC/ CMV (Assist Control/ Continuous Mandatory Ventilation)  RR (machine): 18  TV (machine): 550  FiO2: 40  PEEP: 5  ITime: 1  MAP: 9.6  PIP: 20      LABS:                        9.9    28.83 )-----------( 319      ( 20 Jun 2019 06:43 )             33.2     06-20    143  |  112<H>  |  40<H>  ----------------------------<  181<H>  4.1   |  17<L>  |  2.35<H>    Ca    8.6      20 Jun 2019 06:42  Phos  3.7     06-20  Mg     1.9     06-20    TPro  6.1  /  Alb  2.2<L>  /  TBili  0.3  /  DBili  x   /  AST  35  /  ALT  13  /  AlkPhos  137<H>  06-20        CAPILLARY BLOOD GLUCOSE      POCT Blood Glucose.: 159 mg/dL (20 Jun 2019 11:21)  POCT Blood Glucose.: 177 mg/dL (20 Jun 2019 06:07)  POCT Blood Glucose.: 142 mg/dL (19 Jun 2019 23:52)  POCT Blood Glucose.: 171 mg/dL (19 Jun 2019 17:10)        RADIOLOGY & ADDITIONAL TESTS:    Consultant(s) Notes Reviewed:  [x ] YES  [ ] NO    MEDICATIONS  (STANDING):  ALBUTerol/ipratropium for Nebulization 3 milliLiter(s) Nebulizer every 6 hours  amiodarone    Tablet 200 milliGRAM(s) Oral daily  chlorhexidine 0.12% Liquid 15 milliLiter(s) Oral Mucosa two times a day  chlorhexidine 4% Liquid 1 Application(s) Topical <User Schedule>  dextrose 5%. 1000 milliLiter(s) (50 mL/Hr) IV Continuous <Continuous>  dextrose 50% Injectable 12.5 Gram(s) IV Push once  dextrose 50% Injectable 25 Gram(s) IV Push once  dextrose 50% Injectable 25 Gram(s) IV Push once  fentaNYL   Infusion. 0.5 MICROgram(s)/kG/Hr (4.785 mL/Hr) IV Continuous <Continuous>  heparin  Injectable 7500 Unit(s) SubCutaneous every 8 hours  insulin lispro (HumaLOG) corrective regimen sliding scale   SubCutaneous every 6 hours  meropenem  IVPB 1000 milliGRAM(s) IV Intermittent every 12 hours  norepinephrine Infusion 0.05 MICROgram(s)/kG/Min (8.972 mL/Hr) IV Continuous <Continuous>  polyethylene glycol 3350 17 Gram(s) Oral daily  propofol Infusion 5 MICROgram(s)/kG/Min (2.871 mL/Hr) IV Continuous <Continuous>  QUEtiapine 25 milliGRAM(s) Oral every 12 hours  senna 2 Tablet(s) Oral at bedtime  sodium bicarbonate 650 milliGRAM(s) Oral every 8 hours    MEDICATIONS  (PRN):  acetaminophen    Suspension .. 650 milliGRAM(s) Oral every 6 hours PRN Temp greater or equal to 38C (100.4F)  dextrose 40% Gel 15 Gram(s) Oral once PRN Blood Glucose LESS THAN 70 milliGRAM(s)/deciliter  glucagon  Injectable 1 milliGRAM(s) IntraMuscular once PRN Glucose LESS THAN 70 milligrams/deciliter    PE:    Constitutional: intubated and sedated, NAD   	Head: NC/AT  	Eyes: PERRL, EOMI, clear conjunctiva  	ENT: no nasal discharge; uvula midline, MMM, scab on tongue, ET tube in place  	Neck: supple; no JVD or thyromegaly  	Respiratory: diffuse rhonchi, no increased WOB  	Cardiac: +S1/S2; RRR; no M/R/G;   	Gastrointestinal: soft abdomen, nontender, w/o rebound or guarding, nephrostomy bag in place in abdomen, healing open abdominal wound over central abdomen w/ overlying bandage draining green fluid into a bag, feeds thought NGT  	Extremities:  scaling grey skin in LEs, trace foot b/l edema  	Musculoskeletal: NROM x4; no joint swelling, tenderness or erythema    pscyh: agitated when awake    skin: no bruises or rashes Patient is a 69y old  Male who presents with a chief complaint of acidemia (20 Jun 2019 11:35)      INTERVAL HPI/OVERNIGHT EVENTS: Pt seen and examined at bedside. ROS not obtainable.      ICU Vital Signs Last 24 Hrs  T(C): 38.2 (20 Jun 2019 09:57), Max: 38.2 (20 Jun 2019 09:57)  T(F): 100.7 (20 Jun 2019 09:57), Max: 100.7 (20 Jun 2019 09:57)  HR: 82 (20 Jun 2019 13:00) (70 - 86)  BP: 88/47 (20 Jun 2019 13:00) (88/47 - 114/62)  BP(mean): 67 (20 Jun 2019 13:00) (65 - 84)  ABP: --  ABP(mean): --  RR: 18 (20 Jun 2019 13:00) (17 - 19)  SpO2: 93% (20 Jun 2019 13:00) (93% - 96%)    I&O's Summary    19 Jun 2019 07:01  -  20 Jun 2019 07:00  --------------------------------------------------------  IN: 3042.6 mL / OUT: 1440 mL / NET: 1602.6 mL    20 Jun 2019 07:01  -  20 Jun 2019 14:05  --------------------------------------------------------  IN: 929.8 mL / OUT: 410 mL / NET: 519.8 mL      Mode: AC/ CMV (Assist Control/ Continuous Mandatory Ventilation)  RR (machine): 18  TV (machine): 550  FiO2: 40  PEEP: 5  ITime: 1  MAP: 9.6  PIP: 20      LABS:                        9.9    28.83 )-----------( 319      ( 20 Jun 2019 06:43 )             33.2     06-20    143  |  112<H>  |  40<H>  ----------------------------<  181<H>  4.1   |  17<L>  |  2.35<H>    Ca    8.6      20 Jun 2019 06:42  Phos  3.7     06-20  Mg     1.9     06-20    TPro  6.1  /  Alb  2.2<L>  /  TBili  0.3  /  DBili  x   /  AST  35  /  ALT  13  /  AlkPhos  137<H>  06-20        CAPILLARY BLOOD GLUCOSE      POCT Blood Glucose.: 159 mg/dL (20 Jun 2019 11:21)  POCT Blood Glucose.: 177 mg/dL (20 Jun 2019 06:07)  POCT Blood Glucose.: 142 mg/dL (19 Jun 2019 23:52)  POCT Blood Glucose.: 171 mg/dL (19 Jun 2019 17:10)        RADIOLOGY & ADDITIONAL TESTS:    Consultant(s) Notes Reviewed:  [x ] YES  [ ] NO    MEDICATIONS  (STANDING):  ALBUTerol/ipratropium for Nebulization 3 milliLiter(s) Nebulizer every 6 hours  amiodarone    Tablet 200 milliGRAM(s) Oral daily  chlorhexidine 0.12% Liquid 15 milliLiter(s) Oral Mucosa two times a day  chlorhexidine 4% Liquid 1 Application(s) Topical <User Schedule>  dextrose 5%. 1000 milliLiter(s) (50 mL/Hr) IV Continuous <Continuous>  dextrose 50% Injectable 12.5 Gram(s) IV Push once  dextrose 50% Injectable 25 Gram(s) IV Push once  dextrose 50% Injectable 25 Gram(s) IV Push once  fentaNYL   Infusion. 0.5 MICROgram(s)/kG/Hr (4.785 mL/Hr) IV Continuous <Continuous>  heparin  Injectable 7500 Unit(s) SubCutaneous every 8 hours  insulin lispro (HumaLOG) corrective regimen sliding scale   SubCutaneous every 6 hours  meropenem  IVPB 1000 milliGRAM(s) IV Intermittent every 12 hours  norepinephrine Infusion 0.05 MICROgram(s)/kG/Min (8.972 mL/Hr) IV Continuous <Continuous>  polyethylene glycol 3350 17 Gram(s) Oral daily  propofol Infusion 5 MICROgram(s)/kG/Min (2.871 mL/Hr) IV Continuous <Continuous>  QUEtiapine 25 milliGRAM(s) Oral every 12 hours  senna 2 Tablet(s) Oral at bedtime  sodium bicarbonate 650 milliGRAM(s) Oral every 8 hours    MEDICATIONS  (PRN):  acetaminophen    Suspension .. 650 milliGRAM(s) Oral every 6 hours PRN Temp greater or equal to 38C (100.4F)  dextrose 40% Gel 15 Gram(s) Oral once PRN Blood Glucose LESS THAN 70 milliGRAM(s)/deciliter  glucagon  Injectable 1 milliGRAM(s) IntraMuscular once PRN Glucose LESS THAN 70 milligrams/deciliter    PE:    Constitutional: intubated and sedated, NAD   	Head: NC/AT  	Eyes: PERRL, EOMI, clear conjunctiva  	ENT: no nasal discharge; uvula midline, MMM, scab on tongue, ET tube in place  	Neck: supple; no JVD or thyromegaly  	Respiratory: diffuse rhonchi, no increased WOB  	Cardiac: +S1/S2; RRR; no M/R/G;   	Gastrointestinal: soft abdomen, nontender, w/o rebound or guarding, nephrostomy bag in place in abdomen, healing open abdominal wound over central abdomen w/ overlying bandage draining green fluid into a bag, feeds thought NGT  	Extremities:  scaling grey skin in LEs, trace foot b/l edema  	Musculoskeletal: NROM x4; no joint swelling, tenderness or erythema    pscyh: agitated when awake    skin: no bruises or rashes

## 2019-06-20 NOTE — PROGRESS NOTE ADULT - ASSESSMENT
70 y/o M PMH HTN, chronic back pain, s/p radical cystectomy p/w SOB, found to have severe metabolic and respiratory acidemia, urosepsis with likely left pyelonephritis, with likely acute ATN on CKD, severe dehydration, likely metastatic cancer, admitted to MICU for emergent HD and further workup.    # Non-oliguric HUGO on unknown prior baseline renal function with slowly improving renal function with S cr to 2.3 in setting of HUGO from sepsis vs post obstructive with hydronephrosis vs ATN  - S cr downtrending to 2.3 with non oliguric   - Urology team following for ileal conduit and urostomy  - monitor urine output via urostomy bag  - Adjust antibiotics as per renal dose clearance  - Volume status wet with stable electrolytes  - Maintain MAP>65-70 mm Hg at present  - Cobntinue Ventilatory support and IV pressors for now.  - Can add sodium bicarbonate 650mg via NG tid   - Monitor BMP daily  -No urgent need of RRT/HD at present    # Acute respiratory failure with lung mass:  Plan pe primary  team  goals of care per primary ICu team/palliative care team.

## 2019-06-21 NOTE — PROGRESS NOTE ADULT - SUBJECTIVE AND OBJECTIVE BOX
Patient is a 69y old  Male who presents with a chief complaint of acidemia (20 Jun 2019 14:04)      INTERVAL HPI/OVERNIGHT EVENTS: Pt seen and examined at bedside.       ICU Vital Signs Last 24 Hrs  T(C): 38.5 (21 Jun 2019 07:00), Max: 38.5 (21 Jun 2019 07:00)  T(F): 101.3 (21 Jun 2019 07:00), Max: 101.3 (21 Jun 2019 07:00)  HR: 94 (21 Jun 2019 07:00) (76 - 94)  BP: 100/34 (21 Jun 2019 07:00) (82/44 - 106/49)  BP(mean): 47 (21 Jun 2019 07:00) (47 - 86)  ABP: --  ABP(mean): --  RR: 23 (21 Jun 2019 07:00) (17 - 23)  SpO2: 89% (21 Jun 2019 07:00) (89% - 96%)    I&O's Summary    20 Jun 2019 07:01  -  21 Jun 2019 07:00  --------------------------------------------------------  IN: 2650.6 mL / OUT: 1605 mL / NET: 1045.6 mL    21 Jun 2019 07:01  -  21 Jun 2019 08:03  --------------------------------------------------------  IN: 152.4 mL / OUT: 75 mL / NET: 77.4 mL      Mode: AC/ CMV (Assist Control/ Continuous Mandatory Ventilation)  RR (machine): 18  TV (machine): 550  FiO2: 40  PEEP: 5  ITime: 1  MAP: 11  PIP: 23      LABS:                        9.9    28.66 )-----------( 333      ( 21 Jun 2019 02:34 )             33.0     06-20    143  |  112<H>  |  40<H>  ----------------------------<  181<H>  4.1   |  17<L>  |  2.35<H>    Ca    8.6      20 Jun 2019 06:42  Phos  3.7     06-20  Mg     1.7     06-21    TPro  6.1  /  Alb  2.2<L>  /  TBili  0.3  /  DBili  x   /  AST  35  /  ALT  13  /  AlkPhos  137<H>  06-20    PT/INR - ( 21 Jun 2019 02:33 )   PT: 13.9 sec;   INR: 1.22          PTT - ( 21 Jun 2019 02:33 )  PTT:35.3 sec    CAPILLARY BLOOD GLUCOSE      POCT Blood Glucose.: 138 mg/dL (21 Jun 2019 06:06)  POCT Blood Glucose.: 139 mg/dL (20 Jun 2019 23:32)  POCT Blood Glucose.: 151 mg/dL (20 Jun 2019 17:24)  POCT Blood Glucose.: 159 mg/dL (20 Jun 2019 11:21)        RADIOLOGY & ADDITIONAL TESTS:    Consultant(s) Notes Reviewed:  [x ] YES  [ ] NO    MEDICATIONS  (STANDING):  ALBUTerol/ipratropium for Nebulization 3 milliLiter(s) Nebulizer every 6 hours  amiodarone    Tablet 200 milliGRAM(s) Oral daily  chlorhexidine 0.12% Liquid 15 milliLiter(s) Oral Mucosa two times a day  chlorhexidine 4% Liquid 1 Application(s) Topical <User Schedule>  dextrose 5%. 1000 milliLiter(s) (50 mL/Hr) IV Continuous <Continuous>  dextrose 50% Injectable 12.5 Gram(s) IV Push once  dextrose 50% Injectable 25 Gram(s) IV Push once  dextrose 50% Injectable 25 Gram(s) IV Push once  fentaNYL   Infusion. 0.5 MICROgram(s)/kG/Hr (4.785 mL/Hr) IV Continuous <Continuous>  insulin lispro (HumaLOG) corrective regimen sliding scale   SubCutaneous every 6 hours  meropenem  IVPB 1000 milliGRAM(s) IV Intermittent every 12 hours  norepinephrine Infusion 0.05 MICROgram(s)/kG/Min (8.972 mL/Hr) IV Continuous <Continuous>  polyethylene glycol 3350 17 Gram(s) Oral daily  propofol Infusion 5 MICROgram(s)/kG/Min (2.871 mL/Hr) IV Continuous <Continuous>  QUEtiapine 25 milliGRAM(s) Oral every 12 hours  senna 2 Tablet(s) Oral at bedtime  sodium bicarbonate 650 milliGRAM(s) Oral every 8 hours    MEDICATIONS  (PRN):  acetaminophen    Suspension .. 650 milliGRAM(s) Oral every 6 hours PRN Temp greater or equal to 38C (100.4F)  dextrose 40% Gel 15 Gram(s) Oral once PRN Blood Glucose LESS THAN 70 milliGRAM(s)/deciliter  glucagon  Injectable 1 milliGRAM(s) IntraMuscular once PRN Glucose LESS THAN 70 milligrams/deciliter    PE:    Constitutional: intubated and sedated, NAD   	Head: NC/AT  	Eyes: PERRL, EOMI, clear conjunctiva  	ENT: no nasal discharge; uvula midline, MMM, scab on tongue, ET tube in place  	Neck: supple; no JVD or thyromegaly  	Respiratory: diffuse rhonchi, no increased WOB  	Cardiac: +S1/S2; RRR; no M/R/G;   	Gastrointestinal: soft abdomen, nontender, w/o rebound or guarding, nephrostomy bag in place in abdomen, healing open abdominal wound over central abdomen w/ overlying bandage draining green fluid into a bag, NGT in place feeds held  	Extremities:  scaling grey skin in LEs, trace foot b/l edema, scds in place  	Musculoskeletal: NROM x4; no joint swelling, tenderness or erythema    skin: no bruises or rashes Patient is a 69y old  Male who presents with a chief complaint of acidemia (20 Jun 2019 14:04)      INTERVAL HPI/OVERNIGHT EVENTS: Pt seen and examined at bedside.   ROS not obtainable    ICU Vital Signs Last 24 Hrs  T(C): 38.5 (21 Jun 2019 07:00), Max: 38.5 (21 Jun 2019 07:00)  T(F): 101.3 (21 Jun 2019 07:00), Max: 101.3 (21 Jun 2019 07:00)  HR: 94 (21 Jun 2019 07:00) (76 - 94)  BP: 100/34 (21 Jun 2019 07:00) (82/44 - 106/49)  BP(mean): 47 (21 Jun 2019 07:00) (47 - 86)  ABP: --  ABP(mean): --  RR: 23 (21 Jun 2019 07:00) (17 - 23)  SpO2: 89% (21 Jun 2019 07:00) (89% - 96%)    I&O's Summary    20 Jun 2019 07:01  -  21 Jun 2019 07:00  --------------------------------------------------------  IN: 2650.6 mL / OUT: 1605 mL / NET: 1045.6 mL    21 Jun 2019 07:01  -  21 Jun 2019 08:03  --------------------------------------------------------  IN: 152.4 mL / OUT: 75 mL / NET: 77.4 mL      Mode: AC/ CMV (Assist Control/ Continuous Mandatory Ventilation)  RR (machine): 18  TV (machine): 550  FiO2: 40  PEEP: 5  ITime: 1  MAP: 11  PIP: 23      LABS:                        9.9    28.66 )-----------( 333      ( 21 Jun 2019 02:34 )             33.0     06-20    143  |  112<H>  |  40<H>  ----------------------------<  181<H>  4.1   |  17<L>  |  2.35<H>    Ca    8.6      20 Jun 2019 06:42  Phos  3.7     06-20  Mg     1.7     06-21    TPro  6.1  /  Alb  2.2<L>  /  TBili  0.3  /  DBili  x   /  AST  35  /  ALT  13  /  AlkPhos  137<H>  06-20    PT/INR - ( 21 Jun 2019 02:33 )   PT: 13.9 sec;   INR: 1.22          PTT - ( 21 Jun 2019 02:33 )  PTT:35.3 sec    CAPILLARY BLOOD GLUCOSE      POCT Blood Glucose.: 138 mg/dL (21 Jun 2019 06:06)  POCT Blood Glucose.: 139 mg/dL (20 Jun 2019 23:32)  POCT Blood Glucose.: 151 mg/dL (20 Jun 2019 17:24)  POCT Blood Glucose.: 159 mg/dL (20 Jun 2019 11:21)        RADIOLOGY & ADDITIONAL TESTS:    Consultant(s) Notes Reviewed:  [x ] YES  [ ] NO    MEDICATIONS  (STANDING):  ALBUTerol/ipratropium for Nebulization 3 milliLiter(s) Nebulizer every 6 hours  amiodarone    Tablet 200 milliGRAM(s) Oral daily  chlorhexidine 0.12% Liquid 15 milliLiter(s) Oral Mucosa two times a day  chlorhexidine 4% Liquid 1 Application(s) Topical <User Schedule>  dextrose 5%. 1000 milliLiter(s) (50 mL/Hr) IV Continuous <Continuous>  dextrose 50% Injectable 12.5 Gram(s) IV Push once  dextrose 50% Injectable 25 Gram(s) IV Push once  dextrose 50% Injectable 25 Gram(s) IV Push once  fentaNYL   Infusion. 0.5 MICROgram(s)/kG/Hr (4.785 mL/Hr) IV Continuous <Continuous>  insulin lispro (HumaLOG) corrective regimen sliding scale   SubCutaneous every 6 hours  meropenem  IVPB 1000 milliGRAM(s) IV Intermittent every 12 hours  norepinephrine Infusion 0.05 MICROgram(s)/kG/Min (8.972 mL/Hr) IV Continuous <Continuous>  polyethylene glycol 3350 17 Gram(s) Oral daily  propofol Infusion 5 MICROgram(s)/kG/Min (2.871 mL/Hr) IV Continuous <Continuous>  QUEtiapine 25 milliGRAM(s) Oral every 12 hours  senna 2 Tablet(s) Oral at bedtime  sodium bicarbonate 650 milliGRAM(s) Oral every 8 hours    MEDICATIONS  (PRN):  acetaminophen    Suspension .. 650 milliGRAM(s) Oral every 6 hours PRN Temp greater or equal to 38C (100.4F)  dextrose 40% Gel 15 Gram(s) Oral once PRN Blood Glucose LESS THAN 70 milliGRAM(s)/deciliter  glucagon  Injectable 1 milliGRAM(s) IntraMuscular once PRN Glucose LESS THAN 70 milligrams/deciliter    PE:    Constitutional: intubated and sedated, NAD   	Head: NC/AT  	Eyes: PERRL, EOMI, clear conjunctiva  	ENT: no nasal discharge; uvula midline, MMM, scab on tongue, ET tube in place  	Neck: supple; no JVD or thyromegaly  	Respiratory: diffuse rhonchi, no increased WOB  	Cardiac: +S1/S2; RRR; no M/R/G;   	Gastrointestinal: soft abdomen, nontender, w/o rebound or guarding, nephrostomy bag in place in abdomen, healing open abdominal wound over central abdomen w/ overlying bandage draining green fluid into a bag, NGT in place feeds held  	Extremities:  scaling grey skin in LEs, trace foot b/l edema, scds in place  	Musculoskeletal: NROM x4; no joint swelling, tenderness or erythema    skin: no bruises or rashes

## 2019-06-21 NOTE — PROGRESS NOTE ADULT - ATTENDING COMMENTS
Discussed with IR and Dr. Mitchell feels that the biopsy is risky given his current state. The degree of metastatic cancer coupled with his poor functional status makes the possibility of chemotherapy unlikely in any event and the chances of recovery to good functional status are poor. For now we have been unable to wean given that as sedation is lowered he becomes more tachypneic and uncomfortable. He remains pressor dependent. Will try to diurese nonetheless given pulmonary and peripheral edema.  Hope for meeting with HCP on Monday.  Critical care time rendered 50 minutes

## 2019-06-21 NOTE — CONSULT NOTE ADULT - ASSESSMENT
68 y/o M PMH HTN, smoker, chronic back pain, s/p radical cystectomy w/ one remaining kidney w/ nephrostomy /w SOB x 3 days. He was found to be septic 2/2 to penile infection found to have mass  in RLL and finding of liver/adrenal and osseous mets on imaging.     RLL mass  - suspect primary lung cancer particularly given distribution of metastatic sites (such as adrenals). will need tissue biopsy of liver lesions to determine pathology, plan for biopsy w/ IR today-will f/u results  - prognosis and further steps in management will depend on results of biopsy  - can send tumor markers- , CEA, AFP, LDH, uric acid  - will need closely coordinated discussion with family, palliative care and critical care team in the event that biopsy shows malignancy to determine if treatment with systemic chemotherapy or immunotherapy is possible or not. We are hopeful that his clinical condition will improve and such a discussion can take place. However, if his clinical condition remains like this, he is not a candidate for systemic therapy. Could consider pursuing palliative options at that time, such as palliative RT to RLL mass to alleviate airway obstruction.  - recommend MRI brain for eval fo brain mets, and once clinically improved, a whole body PET/CT to determine extent of disease.    Case to be discussed with Dr Shoemaker

## 2019-06-21 NOTE — PROGRESS NOTE ADULT - SUBJECTIVE AND OBJECTIVE BOX
JANIE ZABALA   MRN-[id number]         CC: Patient is a 69y old  Male who presents with a chief complaint of acidemia (21 Jun 2019 11:08)    HPI:  70 y/o M PMH HTN, smoker, chronic back pain, s/p radical cystectomy w/ one remaining kidney w/ nephrostomy /w SOB x 3 days. Pt denies fever, cough, CP. Pt in pain in R back. No belly pain. Could not provide hx of when had procedures or smoking hx.    In the ED, pt afebrile, HR 72, satting 100% on 3L NC, RR 22-->18, BP 90/60-->120s. Labs significant for WBC 30, K 5.7, bicarb 11, AG 20, VBG pH 7.12-->7.08, CO2 40, lactate 2.6, BUN ~70, Cr ~4, unknown baseline. Carboxyhemoglobin slightly elevated at 2.6. CXR clear. CT chest abd concerning for metastatic lung cancer, and possible cholecystitis and nephritis w/ hydro. S/p vanc, cefepime, ketamine, bicarb drip 150/hr, insulin/ d50, LR 1 L bolus. Transferred to [facility] MICU for emergent HD and further care. On arrival, renal and urology consulted. Pt putting out urine into nephrostomy tube and abg improved so no urgent HD, but renal will monitor and start HD if needed. Pt started on vanc/ zosyn for pyelonephritis and broad coverage while r/o pna and cholecystitis, or infected surgical wound. (13 Jun 2019 15:29)    SUBJECTIVE: Heme Onc consult noted.  Biopsy is too high risk per IR per ICU  ROS:  UNABLE TO OBTAIN  due to: sedated on vent    DYSPNEA (Y/N):	  N/V (Y/N):	  SECRETIONS (Y/N):	  AGITATION (Y/N):  PAIN(Y/N):        -Provocation/Palliation:     -Quality/Quantity:     -Radiating:     -Severity:     -Timing/Frequency:     -Impact on ADLs:     PHYSICAL EXAM:  T(C): 38.7 (06-21-19 @ 10:36), Max: 38.7 (06-21-19 @ 10:36)  T(F): 101.7 (06-21-19 @ 10:36), Max: 101.7 (06-21-19 @ 10:36)  HR: 82 (06-21-19 @ 11:00) (76 - 98)  BP: 82/43 (06-21-19 @ 11:00) (82/43 - 105/51)  RR: 18 (06-21-19 @ 11:00) (18 - 23)  SpO2: 96% (06-21-19 @ 11:00) (89% - 98%)    GENERAL: morbidly obese sick looking gentleman  HEENT: no spont. eye opening     	     CVS: SR on ECG         	  RESP: ETT to CMV on vent       	  GI: NGT            	  : ileal conduit           	  MUSC: no spont. mov't to extremities noted      	  NEURO: sedated on fentanyl    	  PSYCH: sedated on fentanyl           ALLERGIES:  No Known Allergies    OPIATE NAÏVE (Y/N): y  -iStop reviewed (Y/N): Y, on initial consult    MEDICATIONS: reviewed  MEDICATIONS  (STANDING):  ALBUTerol/ipratropium for Nebulization 3 milliLiter(s) Nebulizer every 6 hours  amiodarone    Tablet 200 milliGRAM(s) Oral daily  chlorhexidine 0.12% Liquid 15 milliLiter(s) Oral Mucosa two times a day  chlorhexidine 4% Liquid 1 Application(s) Topical <User Schedule>  dextrose 5%. 1000 milliLiter(s) (50 mL/Hr) IV Continuous <Continuous>  dextrose 50% Injectable 12.5 Gram(s) IV Push once  dextrose 50% Injectable 25 Gram(s) IV Push once  dextrose 50% Injectable 25 Gram(s) IV Push once  fentaNYL   Infusion. 0.5 MICROgram(s)/kG/Hr (4.785 mL/Hr) IV Continuous <Continuous>  furosemide   Injectable 80 milliGRAM(s) IV Push once  insulin lispro (HumaLOG) corrective regimen sliding scale   SubCutaneous every 6 hours  meropenem  IVPB 1000 milliGRAM(s) IV Intermittent every 12 hours  norepinephrine Infusion 0.05 MICROgram(s)/kG/Min (8.972 mL/Hr) IV Continuous <Continuous>  polyethylene glycol 3350 17 Gram(s) Oral daily  propofol Infusion 5 MICROgram(s)/kG/Min (2.871 mL/Hr) IV Continuous <Continuous>  QUEtiapine 25 milliGRAM(s) Oral once  QUEtiapine 50 milliGRAM(s) Oral two times a day  senna 2 Tablet(s) Oral at bedtime  sodium bicarbonate 650 milliGRAM(s) Oral every 8 hours    MEDICATIONS  (PRN):  acetaminophen    Suspension .. 650 milliGRAM(s) Oral every 6 hours PRN Temp greater or equal to 38C (100.4F)  dextrose 40% Gel 15 Gram(s) Oral once PRN Blood Glucose LESS THAN 70 milliGRAM(s)/deciliter  glucagon  Injectable 1 milliGRAM(s) IntraMuscular once PRN Glucose LESS THAN 70 milligrams/deciliter    LABS: reviewed                        9.9    28.66 )-----------( 333      ( 21 Jun 2019 02:34 )             33.0     06-20    143  |  112<H>  |  40<H>  ----------------------------<  181<H>  4.1   |  17<L>  |  2.35<H>    Ca    8.6      20 Jun 2019 06:42  Phos  3.7     06-20  Mg     1.7     06-21    TPro  6.1  /  Alb  2.2<L>  /  TBili  0.3  /  DBili  x   /  AST  35  /  ALT  13  /  AlkPhos  137<H>  06-20    LIVER FUNCTIONS - ( 20 Jun 2019 06:42 )  Alb: 2.2 g/dL / Pro: 6.1 g/dL / ALK PHOS: 137 U/L / ALT: 13 U/L / AST: 35 U/L / GGT: x           PT/INR - ( 21 Jun 2019 02:33 )   PT: 13.9 sec;   INR: 1.22          PTT - ( 21 Jun 2019 02:33 )  PTT:35.3 sec    IMAGING: reviewed    ADVANCE DIRECTIVES:  DNR  Living Will   Decision maker:  Legal surrogate:    PSYCHOSOCIAL-SPIRITUAL ASSESSMENT:  Reviewed    GOALS OF CARE DISCUSSION:  Palliative care info/counseling provided	        See previous Palliative Medicine Note  Documentation of GOC:     AGENCY CHOICE DISCUSSED:     none    REFERRALS:	   Unit SW/Case Mgmt  Nutrition  PT/OT    [ ]CRITICAL CARE TIME PROVIDED TO UNSTABLE PT W/ ORGAN FAILURE            [x]> 50% OF THE TIME SPENT IN COUNSELING AND COORDINATING CARE     [ ]PROLONGED SERVICE       FACE TO FACE: [x]PT     [ ]PT & FAMILY    Start:               End:  	       Minutes: JANIE ZABALA   MRN-[id number]         CC: Patient is a 69y old  Male who presents with a chief complaint of acidemia (2019 11:08)    HPI:  68 y/o M PMH HTN, smoker, chronic back pain, s/p radical cystectomy w/ one remaining kidney w/ nephrostomy /w SOB x 3 days. Pt denies fever, cough, CP. Pt in pain in R back. No belly pain. Could not provide hx of when had procedures or smoking hx.    In the ED, pt afebrile, HR 72, satting 100% on 3L NC, RR 22-->18, BP 90/60-->120s. Labs significant for WBC 30, K 5.7, bicarb 11, AG 20, VBG pH 7.12-->7.08, CO2 40, lactate 2.6, BUN ~70, Cr ~4, unknown baseline. Carboxyhemoglobin slightly elevated at 2.6. CXR clear. CT chest abd concerning for metastatic lung cancer, and possible cholecystitis and nephritis w/ hydro. S/p vanc, cefepime, ketamine, bicarb drip 150/hr, insulin/ d50, LR 1 L bolus. Transferred to [facility] MICU for emergent HD and further care. On arrival, renal and urology consulted. Pt putting out urine into nephrostomy tube and abg improved so no urgent HD, but renal will monitor and start HD if needed. Pt started on vanc/ zosyn for pyelonephritis and broad coverage while r/o pna and cholecystitis, or infected surgical wound. (2019 15:29)    SUBJECTIVE: Heme Onc consult noted.  Biopsy is too high risk per IR per ICU  ROS:  UNABLE TO OBTAIN  due to: sedated on vent    DYSPNEA (Y/N):	  N/V (Y/N):	  SECRETIONS (Y/N):	  AGITATION (Y/N):  PAIN(Y/N):        -Provocation/Palliation:     -Quality/Quantity:     -Radiating:     -Severity:     -Timing/Frequency:     -Impact on ADLs:     PHYSICAL EXAM:  T(C): 38.7 (19 @ 10:36), Max: 38.7 (19 @ 10:36)  T(F): 101.7 (19 @ 10:36), Max: 101.7 (19 @ 10:36)  HR: 82 (19 @ 11:00) (76 - 98)  BP: 82/43 (19 @ 11:00) (82/43 - 105/51)  RR: 18 (19 @ 11:00) (18 - 23)  SpO2: 96% (19 @ 11:00) (89% - 98%)    GENERAL: morbidly obese sick looking gentleman  HEENT: no spont. eye opening     	     CVS: SR on ECG         	  RESP: ETT to CMV on vent       	  GI: NGT            	  : ileal conduit           	  MUSC: no spont. mov't to extremities noted      	  NEURO: sedated on fentanyl    	  PSYCH: sedated on fentanyl           ALLERGIES:  No Known Allergies    OPIATE NAÏVE (Y/N): y  -iStop reviewed (Y/N): Y, on initial consult    MEDICATIONS: reviewed  MEDICATIONS  (STANDING):  ALBUTerol/ipratropium for Nebulization 3 milliLiter(s) Nebulizer every 6 hours  amiodarone    Tablet 200 milliGRAM(s) Oral daily  chlorhexidine 0.12% Liquid 15 milliLiter(s) Oral Mucosa two times a day  chlorhexidine 4% Liquid 1 Application(s) Topical <User Schedule>  dextrose 5%. 1000 milliLiter(s) (50 mL/Hr) IV Continuous <Continuous>  dextrose 50% Injectable 12.5 Gram(s) IV Push once  dextrose 50% Injectable 25 Gram(s) IV Push once  dextrose 50% Injectable 25 Gram(s) IV Push once  fentaNYL   Infusion. 0.5 MICROgram(s)/kG/Hr (4.785 mL/Hr) IV Continuous <Continuous>  furosemide   Injectable 80 milliGRAM(s) IV Push once  insulin lispro (HumaLOG) corrective regimen sliding scale   SubCutaneous every 6 hours  meropenem  IVPB 1000 milliGRAM(s) IV Intermittent every 12 hours  norepinephrine Infusion 0.05 MICROgram(s)/kG/Min (8.972 mL/Hr) IV Continuous <Continuous>  polyethylene glycol 3350 17 Gram(s) Oral daily  propofol Infusion 5 MICROgram(s)/kG/Min (2.871 mL/Hr) IV Continuous <Continuous>  QUEtiapine 25 milliGRAM(s) Oral once  QUEtiapine 50 milliGRAM(s) Oral two times a day  senna 2 Tablet(s) Oral at bedtime  sodium bicarbonate 650 milliGRAM(s) Oral every 8 hours    MEDICATIONS  (PRN):  acetaminophen    Suspension .. 650 milliGRAM(s) Oral every 6 hours PRN Temp greater or equal to 38C (100.4F)  dextrose 40% Gel 15 Gram(s) Oral once PRN Blood Glucose LESS THAN 70 milliGRAM(s)/deciliter  glucagon  Injectable 1 milliGRAM(s) IntraMuscular once PRN Glucose LESS THAN 70 milligrams/deciliter    LABS: reviewed                        9.9    28.66 )-----------( 333      ( 2019 02:34 )             33.0     06-20    143  |  112<H>  |  40<H>  ----------------------------<  181<H>  4.1   |  17<L>  |  2.35<H>    Ca    8.6      2019 06:42  Phos  3.7     06-20  Mg     1.7     -21    TPro  6.1  /  Alb  2.2<L>  /  TBili  0.3  /  DBili  x   /  AST  35  /  ALT  13  /  AlkPhos  137<H>  06-20    LIVER FUNCTIONS - ( 2019 06:42 )  Alb: 2.2 g/dL / Pro: 6.1 g/dL / ALK PHOS: 137 U/L / ALT: 13 U/L / AST: 35 U/L / GGT: x           PT/INR - ( 2019 02:33 )   PT: 13.9 sec;   INR: 1.22          PTT - ( 2019 02:33 )  PTT:35.3 sec    IMAGING: reviewed    ADVANCE DIRECTIVES:  DNR  Living Will   Decision maker: pt without capacity to make med decisions at this time, primary HCP is partner Vinod Yatesb  Legal surrogate: alt HCP on pt's Living Will is  sister Vaughn Harper, second alt HCP is friend Candelaria Antonio [phone] (c), [phone] (H)    PSYCHOSOCIAL-SPIRITUAL ASSESSMENT:  Reviewed    GOALS OF CARE DISCUSSION:  Palliative care info/counseling provided	        See previous Palliative Medicine Note  Documentation of GOC:  cont. current ICU care    AGENCY CHOICE DISCUSSED:     none    REFERRALS:	   Unit SW/Case Mgmt  Nutrition  PT/OT    [ ]CRITICAL CARE TIME PROVIDED TO UNSTABLE PT W/ ORGAN FAILURE            [x]> 50% OF THE TIME SPENT IN COUNSELING AND COORDINATING CARE     [ ]PROLONGED SERVICE       FACE TO FACE: [x]PT     [ ]PT & FAMILY    Start:               End:  	       Minutes:

## 2019-06-21 NOTE — PROGRESS NOTE ADULT - ASSESSMENT
68 y/o M PMH smoker (50 years and current), sleep apnea w/o cpap, short-term mem loss, HTN, DM2, chronic back pain s/p multiple back surgeries w/ hardware on methadone, anxiety, SBO s/p sx c/b wound that never healed, prostate cancer 1999 s/p radiation c/b radical cystectomy and nephrostomy w/ ileal conduit (in remission), p/w SOB, found to have severe acidemia, sepsis 2/2 pyelonephritis vs PNA, new onset suspected lung cancer w/ mets, admitted to MICU. Pending IR biopsy and oncology consult.    Neurology  #TME  agitation/ anxiety hx/ short term mem loss hx, likely 2/2 uremia vs sepsis vs baseline per partner when has short term mem loss and gets confused and agitated  -sedation and intubation until clinically improves  -c/w home sertraline when not sedated  -c/w Seroquel 25 bid, monitor qtc    Cardiac  #mild aortic aneurysm  dilation of the aortic root and ascending aorta on ct, no known hx, no AR on echo  -control sBP <150     #hypotension  likely 2/2 sedation and sepsis, lactate improved to 2.0, cannot assess true septic levo requirements until off sedation  -c/w levophed for now, titrate as BP tolerates    #htn  hx of htn  -currently on pressors - holding home anti-hypertensive medications    Pulmonary  #lung cancer w/ mets  CT chest/ abd w/o contrast due to cr 4.5 showing 7cm RLL mass concerning for neoplasm w/ suspected hepatic and adrenal mets and abd LAD on ct, no known hx of lung cancer per partner, never been treated as pt was unaware, chronic smoker but never screened with CT per partner, dopplers neg for dvt, bronchoscopy 6/17 showing RML mucous plugging, sent for sputum cx and cytology, GOC per partner- living will no definitive decisions but leaves all GOC decisions to partner, pal care consulted, pt DNR, -suspect PNA is post-obstructive pna 2/2 mass. bronchoscopy negative for malignant cells- HCP would like IR biopsy and oncology consult  -pal care consulted  -oncology consulted, f/u recs  -IR aware and scheduled for IR guided liver mets biopsy 6/21    #smoker   50 years and current, no recent ct scans  -nicotine patch prn when pt not sedated    #sleep apnea  offered cpap at home but did not use  -currently intubated  -cpap at night when off [facility] vent    #COPD  no dx, no inhalers, emphysema on CT scan of chest, suspect resp acidosis 2/2 COPD that is chronic and preventing compensation of met acidosis  -intubation to decrease WOB and increase RR if needed to compensate for metabolic acidosis  -duonebs q6 standing     #post obstructive PNA 2/2 suspected lung cancer  CT chest shows RLL PNA, pt p/w SOB w/ tachypnea and WBC 30, no hypoxia, no fevers, s/p vanc/ cefepime in ED--> vanc and zosyn--> vanc tito--> tito, dcd vanc 6/18, bcx ngtd, sputum cx ngtd  -daily cxr- unchanged  -c.w meropenam    Renal  #HUGO on CKD  Cr 4.5 on admission, baseline cr 1.6 2/19 per PMD, cr improving to ~2.9, pt also with uremia that is downtrending, renal consulted and are monitoring but currently no HD required and pt making urine  -f/u renal recs  -monitor bmp  -possible HD in near future  -monitor UO   -renally dose medications    Metabolic  #metabolic acidosis   bicarb 17, no AG, no diarrhea, cl high suggesting hyperchloremic etiology 2/2 NS  -monitor bmp  -renal consult as above  -intubated as above for resp compensation (high vt and rr)  -c/w sodium bicarb po 650 tid  -change meds from NS to d5    ID  # sepsis 2/2 post-obstructive PNA  on admission wbc 30, w/ tachypnea to 22 meets 2/4 sirs criteria afebrile, lactate 2.6. cxr clear, ct chest RLL PNA and under mass. ct scan showing perinephritic stranding and mod hydronephrosis concerning for UTI/ nephritis UA+, but urince cx neg. pt w/ distended gallbladder w/ stones concerning for cholecystitis but  HIDA scan neg, blood and sputum and urine cultures no growth, wound care for skin wound over abdomen (doesnt look infected, from SBO s/p sx c/b wound that never healed, ) penile dc cx grows e faecalis sensitive to amp/vanc. pt with no culture data other than e fecalis in penile cx, covered by meropenam (alos covering for empiric PNA and pyelo), lactate nomrlaized to 2, WBC still elevated to 30 and pt still febrile, likely has post obstructive PNA without source control so continues to spike and have wbc  -monitor wbc, fever curve  -c/w tito since 6/15 for PNA, possible pyelo, and  penile infection (at least 7 day course, consider stopping 6/22)    Endocrine  #DM2  a1c 7.4, on home metformin, fsgs controlled  -ISS  -add insulin regimen if needed    MSK  #chronic back pain  chronic back pain s/p multiple back surgeries w/ hardware on methadone, utox  positive for methadone only which per partner pt gest from pain specialist Dr Kwan, takes  5mg 2-4 pills qd   -methadone 5-20 mg qd, higher end if needed for pain control when off sedation      #cystectomy with nephrostomy  ct showing mod hydro but draining urine, R kidney atrophy, prostate cancer 1999 s/p radiation c/b radical cystectomy and nephrostomy w/ ileal conduit (in remission)  -urology consulted, f.u recs  -monitor UO from nephrostomy bag    GI  #npo with tube feeds  -tube feeds through ngt    #constipation  hx of sbo, no sbo on ct abd, belly benign on exam, now s/p 2 BMs am 6/19 and 1 6/20  -bowel regimen  -if residuals in feeds >500, add reglan    Heme  anemia, normocytic, likely 2/2 hugo on ckd and acd, cbc stable no s./s of bleed  -monitor CBC    OTHER  F: none  E: replete PRN but cautious in Hugo in CKD  N: npo with tube feeds, npo after 12 pending ir biopsy today  DVT ppx: HSQ 5000 q8 (held for iR), scds  other ppx: none required as pt on feeds except for procedure  DNR, not DNI for now  dispo: MICU

## 2019-06-21 NOTE — PROGRESS NOTE ADULT - ASSESSMENT
70 y/o M PMH HTN, chronic back pain, s/p radical cystectomy p/w SOB, found to have severe metabolic and respiratory acidemia, urosepsis with likely left pyelonephritis, with likely acute ATN on CKD, severe dehydration, likely metastatic cancer, admitted to MICU for emergent HD and further workup.    # Non-oliguric HUGO on unknown prior baseline renal function with slowly improving renal function with S cr to 2.3 in setting of HUGO from sepsis vs post obstructive with hydronephrosis vs ATN  - S cr downtrending to 2.3 with non oliguric   - No new labs available at present.  - Urology team following for ileal conduit and urostomy  - monitor urine output via urostomy bag. Last 24 hours 1.2 L urine output  - Adjust antibiotics as per renal dose clearance  - Volume status wet with stable electrolytes  - Maintain MAP>65-70 mm Hg at present  - Cobntinue Ventilatory support and IV pressors for now.  - Can add sodium bicarbonate 650mg via NG tid   - Consider lasix IV to maintain net neutral to mild negative fluid balance  - Monitor BMP daily  -No urgent need of RRT/HD at present    # Acute respiratory failure with lung mass:  Plan pe primary  team  goals of care per primary ICu team/palliative care team.  Overall prognosis seems to be poor.

## 2019-06-21 NOTE — CHART NOTE - NSCHARTNOTEFT_GEN_A_CORE
Infectious Diseases Anti-infective Approval Note    Medication:  Meropenem  Dose:  1 g  Route:  IV  Frequency:  q12h  Duration:  3d     Dose may be adjusted as needed for alterations in renal function.    *THIS IS NOT AN INFECTIOUS DISEASES CONSULTATION*

## 2019-06-21 NOTE — PROGRESS NOTE ADULT - SUBJECTIVE AND OBJECTIVE BOX
Patient is a 69y Male seen and evaluated at bedside. Patient lying in bed remains critically ill on ventilatory support and IV pressors. No new labs available. Interval decrease of S cr as of yesterday to 2.3 with non oliguria with 1.2 L urine output with net positive fluid balance of 1L in past 24 hours. Chest xray with bilateral effusion and lung mass on FIO2 40%      acetaminophen    Suspension .. 650 every 6 hours PRN  ALBUTerol/ipratropium for Nebulization 3 every 6 hours  amiodarone    Tablet 200 daily  chlorhexidine 0.12% Liquid 15 two times a day  chlorhexidine 4% Liquid 1 <User Schedule>  dextrose 40% Gel 15 once PRN  dextrose 5%. 1000 <Continuous>  dextrose 50% Injectable 12.5 once  dextrose 50% Injectable 25 once  dextrose 50% Injectable 25 once  fentaNYL   Infusion. 0.5 <Continuous>  glucagon  Injectable 1 once PRN  insulin lispro (HumaLOG) corrective regimen sliding scale  every 6 hours  meropenem  IVPB 1000 every 12 hours  norepinephrine Infusion 0.05 <Continuous>  polyethylene glycol 3350 17 daily  propofol Infusion 5 <Continuous>  QUEtiapine 25 every 12 hours  senna 2 at bedtime  sodium bicarbonate 650 every 8 hours      Allergies    No Known Allergies    Intolerances        T(C): , Max: 38.7 (06-21-19 @ 10:36)  T(F): , Max: 101.7 (06-21-19 @ 10:36)  HR: 84 (06-21-19 @ 10:00)  BP: 84/46 (06-21-19 @ 10:00)  BP(mean): 62 (06-21-19 @ 10:00)  RR: 18 (06-21-19 @ 10:00)  SpO2: 96% (06-21-19 @ 10:00)  Wt(kg): --    06-20 @ 07:01  -  06-21 @ 07:00  --------------------------------------------------------  IN: 2650.6 mL / OUT: 1605 mL / NET: 1045.6 mL    06-21 @ 07:01  -  06-21 @ 10:49  --------------------------------------------------------  IN: 204.8 mL / OUT: 75 mL / NET: 129.8 mL          Review of Systems:  Limited. Sedated and intubated.    PHYSICAL EXAM:  GENERAL: Ill appearing, lying in bed, sedated and intubated on ventilatory support  HEAD:  Atraumatic, Normocephalic,   EYES: Bilateral conjunctival and scleral pallor   Oral cavity: Oral mucosa dry and pink  NECK: Neck supple, No JVD, ET tube in place  CHEST/LUNG: Bilateral decreased breath sounds, Bibasilar rales and crepitations, no wheezing  HEART: Regular rate and rhythm. KJ II/VI at LPSB, No gallop, no rub   ABDOMEN: Soft, Nontender, non distended, bowel sounds present   EXTREMITIES: Bilateral leg trace edema  Neurology: AAOx0, sedated and intubated.  SKIN: No rashes or lesions      LABS:                        9.9    28.66 )-----------( 333      ( 21 Jun 2019 02:34 )             33.0     06-20    143  |  112<H>  |  40<H>  ----------------------------<  181<H>  4.1   |  17<L>  |  2.35<H>    Ca    8.6      20 Jun 2019 06:42  Phos  3.7     06-20  Mg     1.7     06-21    TPro  6.1  /  Alb  2.2<L>  /  TBili  0.3  /  DBili  x   /  AST  35  /  ALT  13  /  AlkPhos  137<H>  06-20      PT/INR - ( 21 Jun 2019 02:33 )   PT: 13.9 sec;   INR: 1.22          PTT - ( 21 Jun 2019 02:33 )  PTT:35.3 sec          RADIOLOGY & ADDITIONAL STUDIES:  < from: Xray Chest 1 View- PORTABLE-Routine (06.19.19 @ 07:08) >    EXAM:  XR CHEST PORTABLE ROUTINE 1V                          PROCEDURE DATE:  06/19/2019          INTERPRETATION:  CLINICAL INDICATION: 69-year-old with pneumonia and mass.      IMPRESSION: Frontal view of the chest is compared to 6/18/2019 and   demonstrates endotracheal tube at the level of the fariba. Cardiomegaly.   Assistant dense bibasilar consolidation. Persistent large layering   bilateral pleural effusions. Persistent severe pulmonary edema. NG tube   tip below level the diaphragm. Lowlung volumes.            Thank you for the opportunity to participate in the care of this patient.        PRISCILLA COTTON M.D., ATTENDING RADIOLOGIST  This document has been electronically signed. Jun 19 2019  2:09PM                  < end of copied text >

## 2019-06-21 NOTE — CONSULT NOTE ADULT - SUBJECTIVE AND OBJECTIVE BOX
Hematology Consult Note (Dr Shoemaker)    Pt seen and examined at bedside.     Briefly patient is a 70 y/o M PMH HTN, smoker, chronic back pain, s/p radical cystectomy w/ one remaining kidney w/ nephrostomy /w SOB x 3 days. He was found to be septic 2/2 to penile infection, currently intubated. Heme/Onc consulted as imaging showed Progressive complete masslike consolidation of the right lower lobe with enlarging moderate right parapneumonic effusion; new trace left pleural effusion. No interval change in hepatic, adrenal and possible osseous metastasis. ICU team and patient's family wishing to know prognosis of patient's tumor. His history is notable for prostate cancer. He has not had a biopsy yet. He is scheduled today for biopsy w/ IR of liver mets.     Allergies    No Known Allergies    Intolerances    MEDICATIONS  (STANDING):  ALBUTerol/ipratropium for Nebulization 3 milliLiter(s) Nebulizer every 6 hours  amiodarone    Tablet 200 milliGRAM(s) Oral daily  chlorhexidine 0.12% Liquid 15 milliLiter(s) Oral Mucosa two times a day  chlorhexidine 4% Liquid 1 Application(s) Topical <User Schedule>  dextrose 5%. 1000 milliLiter(s) (50 mL/Hr) IV Continuous <Continuous>  dextrose 50% Injectable 12.5 Gram(s) IV Push once  dextrose 50% Injectable 25 Gram(s) IV Push once  dextrose 50% Injectable 25 Gram(s) IV Push once  fentaNYL   Infusion. 0.5 MICROgram(s)/kG/Hr (4.785 mL/Hr) IV Continuous <Continuous>  insulin lispro (HumaLOG) corrective regimen sliding scale   SubCutaneous every 6 hours  meropenem  IVPB 1000 milliGRAM(s) IV Intermittent every 12 hours  norepinephrine Infusion 0.05 MICROgram(s)/kG/Min (8.972 mL/Hr) IV Continuous <Continuous>  polyethylene glycol 3350 17 Gram(s) Oral daily  propofol Infusion 5 MICROgram(s)/kG/Min (2.871 mL/Hr) IV Continuous <Continuous>  QUEtiapine 25 milliGRAM(s) Oral every 12 hours  senna 2 Tablet(s) Oral at bedtime  sodium bicarbonate 650 milliGRAM(s) Oral every 8 hours    MEDICATIONS  (PRN):  acetaminophen    Suspension .. 650 milliGRAM(s) Oral every 6 hours PRN Temp greater or equal to 38C (100.4F)  dextrose 40% Gel 15 Gram(s) Oral once PRN Blood Glucose LESS THAN 70 milliGRAM(s)/deciliter  glucagon  Injectable 1 milliGRAM(s) IntraMuscular once PRN Glucose LESS THAN 70 milligrams/deciliter        PAST MEDICAL & SURGICAL HISTORY:  Chronic back pain  HTN (hypertension)  H/O total cystectomy  Prostate cancer      FAMILY HISTORY: unclear      SOCIAL HISTORY: No EtOH, former smoker    REVIEW OF SYSTEMS:    Unable to obtain    ICU Vital Signs Last 24 Hrs  T(C): 38.7 (21 Jun 2019 10:36), Max: 38.7 (21 Jun 2019 10:36)  T(F): 101.7 (21 Jun 2019 10:36), Max: 101.7 (21 Jun 2019 10:36)  HR: 82 (21 Jun 2019 11:00) (76 - 98)  BP: 82/43 (21 Jun 2019 11:00) (82/43 - 105/51)  BP(mean): 62 (21 Jun 2019 11:00) (47 - 86)  RR: 18 (21 Jun 2019 11:00) (18 - 23)  SpO2: 96% (21 Jun 2019 11:00) (89% - 98%)      GENERAL: intubated and sedated  HEAD:  Atraumatic, Normocephalic  EYES: EOMI, PERRLA, conjunctiva and sclera clear  NECK: Supple, No JVD  CHEST/LUNG: rhonchorus breath sounds, decreased BS R base  HEART: Regular rate and rhythm; No murmurs, rubs, or gallops S1S2  ABDOMEN: Soft, Nontender, Nondistended; Bowel sounds present + ileostomy  EXTREMITIES:  2+ Peripheral Pulses, No clubbing, cyanosis, + edema  NEUROLOGY: non-focal  SKIN: No rashes or lesions                          9.9    28.66 )-----------( 333      ( 21 Jun 2019 02:34 )             33.0       06-20    143  |  112<H>  |  40<H>  ----------------------------<  181<H>  4.1   |  17<L>  |  2.35<H>    Ca    8.6      20 Jun 2019 06:42  Phos  3.7     06-20  Mg     1.7     06-21    TPro  6.1  /  Alb  2.2<L>  /  TBili  0.3  /  DBili  x   /  AST  35  /  ALT  13  /  AlkPhos  137<H>  06-20      Magnesium, Serum: 1.7 mg/dL (06-21 @ 02:33) Hematology Consult Note (Dr Shoemaker)    Pt seen and examined at bedside.     Briefly patient is a 70 y/o M PMH HTN, smoker, chronic back pain, s/p radical cystectomy w/ one remaining kidney w/ nephrostomy /w SOB x 3 days. He was found to be septic 2/2 to penile infection, currently intubated. Heme/Onc consulted as imaging showed Progressive complete masslike consolidation of the right lower lobe with enlarging moderate right parapneumonic effusion; new trace left pleural effusion. No interval change in hepatic, adrenal and possible osseous metastasis. ICU team and patient's family wishing to know prognosis of patient's tumor. His history is notable for prostate cancer. He has not had a biopsy yet. He is scheduled today for biopsy w/ IR of liver mets.     Allergies    No Known Allergies    Intolerances    MEDICATIONS  (STANDING):  ALBUTerol/ipratropium for Nebulization 3 milliLiter(s) Nebulizer every 6 hours  amiodarone    Tablet 200 milliGRAM(s) Oral daily  chlorhexidine 0.12% Liquid 15 milliLiter(s) Oral Mucosa two times a day  chlorhexidine 4% Liquid 1 Application(s) Topical <User Schedule>  dextrose 5%. 1000 milliLiter(s) (50 mL/Hr) IV Continuous <Continuous>  dextrose 50% Injectable 12.5 Gram(s) IV Push once  dextrose 50% Injectable 25 Gram(s) IV Push once  dextrose 50% Injectable 25 Gram(s) IV Push once  fentaNYL   Infusion. 0.5 MICROgram(s)/kG/Hr (4.785 mL/Hr) IV Continuous <Continuous>  insulin lispro (HumaLOG) corrective regimen sliding scale   SubCutaneous every 6 hours  meropenem  IVPB 1000 milliGRAM(s) IV Intermittent every 12 hours  norepinephrine Infusion 0.05 MICROgram(s)/kG/Min (8.972 mL/Hr) IV Continuous <Continuous>  polyethylene glycol 3350 17 Gram(s) Oral daily  propofol Infusion 5 MICROgram(s)/kG/Min (2.871 mL/Hr) IV Continuous <Continuous>  QUEtiapine 25 milliGRAM(s) Oral every 12 hours  senna 2 Tablet(s) Oral at bedtime  sodium bicarbonate 650 milliGRAM(s) Oral every 8 hours    MEDICATIONS  (PRN):  acetaminophen    Suspension .. 650 milliGRAM(s) Oral every 6 hours PRN Temp greater or equal to 38C (100.4F)  dextrose 40% Gel 15 Gram(s) Oral once PRN Blood Glucose LESS THAN 70 milliGRAM(s)/deciliter  glucagon  Injectable 1 milliGRAM(s) IntraMuscular once PRN Glucose LESS THAN 70 milligrams/deciliter        PAST MEDICAL & SURGICAL HISTORY:  Chronic back pain  HTN (hypertension)  H/O total cystectomy  Prostate cancer      FAMILY HISTORY: unclear      SOCIAL HISTORY: No EtOH, former smoker    REVIEW OF SYSTEMS:    Unable to obtain    ICU Vital Signs Last 24 Hrs  T(C): 38.7 (21 Jun 2019 10:36), Max: 38.7 (21 Jun 2019 10:36)  T(F): 101.7 (21 Jun 2019 10:36), Max: 101.7 (21 Jun 2019 10:36)  HR: 82 (21 Jun 2019 11:00) (76 - 98)  BP: 82/43 (21 Jun 2019 11:00) (82/43 - 105/51)  BP(mean): 62 (21 Jun 2019 11:00) (47 - 86)  RR: 18 (21 Jun 2019 11:00) (18 - 23)  SpO2: 96% (21 Jun 2019 11:00) (89% - 98%)      GENERAL: intubated and sedated  HEAD:  Atraumatic, Normocephalic  EYES: EOMI, PERRLA, conjunctiva and sclera clear  NECK: Supple, No JVD  CHEST/LUNG: rhonchorus breath sounds, decreased BS R base  HEART: Regular rate and rhythm; No murmurs, rubs, or gallops S1S2  ABDOMEN: Soft, Nontender, Nondistended; Bowel sounds present + ileostomy  EXTREMITIES:  2+ Peripheral Pulses, No clubbing, cyanosis, + edema  NEUROLOGY: non-focal  SKIN: No rashes or lesions                          9.9    28.66 )-----------( 333      ( 21 Jun 2019 02:34 )             33.0       06-20    143  |  112<H>  |  40<H>  ----------------------------<  181<H>  4.1   |  17<L>  |  2.35<H>    Ca    8.6      20 Jun 2019 06:42  Phos  3.7     06-20  Mg     1.7     06-21    TPro  6.1  /  Alb  2.2<L>  /  TBili  0.3  /  DBili  x   /  AST  35  /  ALT  13  /  AlkPhos  137<H>  06-20      Magnesium, Serum: 1.7 mg/dL (06-21 @ 02:33)      < from: CT Abdomen and Pelvis No Cont (06.16.19 @ 17:12) >  Evaluation of the chest demonstrates mild heterogeneity of the thyroid   gland parenchyma. Central venous catheter with the tip in the SVC. Normal   heart size. Interval enlargement of moderate right parapneumonic effusion   with new trace left pleural effusion. Negative for thoracic inlet or   axillary adenopathy. Mild mediastinal adenopathy with dominant lymph   nodes measuring up to 15 mm in the pretracheal region. There is severe   coronary arterial calcification. Heavy mitral annular calcification.   Minimal calcification of aortic valve, which can correlate with degree of   aortic stenosis. There is bilateral gynecomastia. Evaluation of the lung   parenchyma demonstrates progressive complete masslike consolidation of   the right lower lobe. Multiple calcified granulomata within the right   lower lobe. No interval change in multiple small 4 to 6 mm pulmonary   nodules scattered throughout the left lung, largest located within the   left upper lobe/left apex medially measuring 6 mm. There is no interval   change in paraseptal emphysematous change. No endobronchial lesion.   Endotracheal tube above the level of the fariba.    Evaluation of the abdomen demonstrates no interval change in marked   distention of the gallbladder. Layering sludge within the gallbladder. No   interval change in multiple nodules within the liver, largest located   within the anterior segment of the right lobe measuring 2.2 cm and also   within the inferior tip of the liver measuring 2.8 cm. The unenhanced   spleen and pancreas are unremarkable. No interval change in bilateral   adrenal masses measuring 4.6 cm on the right and 2.5 cm on the left.   Bilateral nephrolithiasis. Cortical atrophy of the right kidney with   persistent moderate right hydroureteronephrosis with right lower quadrant   ileal conduit; there is persistent thickening and edematous infiltration   surrounding the proximal right ureter. Evaluation of the gastrointestinal   tract demonstrates NG tube tip within the stomach. Small duodenal lipoma.   No bowel thickening. No bowel obstruction. Right lower quadrant   ileostomy. Nonobstructing parastomal hernia containing a moderate segment   of transverse colon within the right lower abdominal wall. Evaluation of   the pelvis demonstrates radical cystectomy.There are small bilateral   fat-containing inguinal hernias. There are increased number of   nonenlarged retroperitoneal lymph nodes measuring up to 8 mm. There is   severe aortic and vascular calcification, most severe of the external   iliac arteries. Evaluation of the osseous structures and straits   unchanged sclerotic regions within adjacent mid thoracic vertebral   bodies. Partially visualized anterior cervical fusion hardware. Posterior   lumbar fusion hardware.        IMPRESSION:    Progressive complete masslike consolidation of the right lower lobe with   enlarging moderate right parapneumonic effusion; new trace left pleural   effusion.    No interval change in hepatic, adrenal and possible osseous metastasis.    Right lower quadrant ileostomy; persistent moderate right   hydroureteronephrosis with possible persistent infectious/inflammatory   ureteritis.    < end of copied text >

## 2019-06-22 NOTE — PROGRESS NOTE ADULT - ASSESSMENT
patient is a 69 y o White male with PMH of unknown baseline cr, patient is a 69 y o White male with PMH of unknown baseline cr, HTN, chronic back pain, s/p radical cystectomy who was admitted for severe acidosis, and HUGO, HEnce nephrology consult.    HUGO  non oliguric   cr @ 2.44  volume status wet--> recommend lasix 80 mg IV daily to achieve euvolemic state  renally dose abx  monitor I/o  keep MAP > 65      acidosis patient is a 69 y o White male with PMH of unknown baseline cr, HTN, chronic back pain, s/p radical cystectomy who was admitted for severe acidosis, and PO, HEnce nephrology consult.    PO  non oliguric   DDX: ATN from sepsis   cr @ 2.44  volume status wet--> recommend lasix 80 mg IV daily to achieve euvolemic state  renally dose abx  monitor I/o  keep MAP > 65      acidosis  DDX Po  recommend increasing sodium bicarbonate to 1300 mg po tid

## 2019-06-22 NOTE — PROGRESS NOTE ADULT - SUBJECTIVE AND OBJECTIVE BOX
Patient is a 69y old  Male who presents with a chief complaint of acidemia (21 Jun 2019 14:13)      INTERVAL HPI/OVERNIGHT EVENTS: Pt seen and examined at bedside.       ICU Vital Signs Last 24 Hrs  T(C): 39.5 (22 Jun 2019 10:30), Max: 39.5 (22 Jun 2019 10:30)  T(F): 103.1 (22 Jun 2019 10:30), Max: 103.1 (22 Jun 2019 10:30)  HR: 108 (22 Jun 2019 10:00) (82 - 108)  BP: 100/56 (22 Jun 2019 10:00) (78/28 - 119/91)  BP(mean): 68 (22 Jun 2019 10:00) (46 - 105)  ABP: --  ABP(mean): --  RR: 27 (22 Jun 2019 10:00) (18 - 31)  SpO2: 97% (22 Jun 2019 10:00) (88% - 97%)    I&O's Summary    21 Jun 2019 07:01  -  22 Jun 2019 07:00  --------------------------------------------------------  IN: 2091.2 mL / OUT: 4135 mL / NET: -2043.8 mL    22 Jun 2019 07:01  -  22 Jun 2019 10:49  --------------------------------------------------------  IN: 237 mL / OUT: 575 mL / NET: -338 mL      Mode: AC/ CMV (Assist Control/ Continuous Mandatory Ventilation)  RR (machine): 18  TV (machine): 550  FiO2: 40  PEEP: 5  PS: 5  ITime: 1  MAP: 12  PIP: 22      LABS:                        10.2   29.55 )-----------( 399      ( 22 Jun 2019 07:52 )             33.7     06-21    139  |  109<H>  |  50<H>  ----------------------------<  209<H>  3.9   |  17<L>  |  2.44<H>    Ca    8.6      21 Jun 2019 23:00  Mg     1.7     06-21    TPro  6.6  /  Alb  2.0<L>  /  TBili  0.4  /  DBili  <0.2  /  AST  43<H>  /  ALT  16  /  AlkPhos  148<H>  06-22    PT/INR - ( 22 Jun 2019 05:21 )   PT: 14.2 sec;   INR: 1.25          PTT - ( 21 Jun 2019 02:33 )  PTT:35.3 sec    CAPILLARY BLOOD GLUCOSE      POCT Blood Glucose.: 206 mg/dL (22 Jun 2019 06:32)  POCT Blood Glucose.: 188 mg/dL (21 Jun 2019 23:52)  POCT Blood Glucose.: 147 mg/dL (21 Jun 2019 16:39)  POCT Blood Glucose.: 118 mg/dL (21 Jun 2019 11:40)        RADIOLOGY & ADDITIONAL TESTS:    Consultant(s) Notes Reviewed:  [x ] YES  [ ] NO    MEDICATIONS  (STANDING):  ALBUTerol/ipratropium for Nebulization 3 milliLiter(s) Nebulizer every 6 hours  amiodarone    Tablet 200 milliGRAM(s) Oral daily  chlorhexidine 0.12% Liquid 15 milliLiter(s) Oral Mucosa two times a day  chlorhexidine 4% Liquid 1 Application(s) Topical <User Schedule>  dextrose 5%. 1000 milliLiter(s) (50 mL/Hr) IV Continuous <Continuous>  dextrose 50% Injectable 12.5 Gram(s) IV Push once  dextrose 50% Injectable 25 Gram(s) IV Push once  dextrose 50% Injectable 25 Gram(s) IV Push once  fentaNYL   Infusion. 0.5 MICROgram(s)/kG/Hr (4.785 mL/Hr) IV Continuous <Continuous>  heparin  Injectable 7500 Unit(s) SubCutaneous every 8 hours  insulin lispro (HumaLOG) corrective regimen sliding scale   SubCutaneous every 6 hours  meropenem  IVPB 1000 milliGRAM(s) IV Intermittent every 12 hours  norepinephrine Infusion 0.05 MICROgram(s)/kG/Min (8.972 mL/Hr) IV Continuous <Continuous>  propofol Infusion 5 MICROgram(s)/kG/Min (2.871 mL/Hr) IV Continuous <Continuous>  QUEtiapine 50 milliGRAM(s) Oral every 12 hours  sodium bicarbonate 650 milliGRAM(s) Oral every 8 hours    MEDICATIONS  (PRN):  acetaminophen    Suspension .. 650 milliGRAM(s) Oral every 6 hours PRN Temp greater or equal to 38C (100.4F)  dextrose 40% Gel 15 Gram(s) Oral once PRN Blood Glucose LESS THAN 70 milliGRAM(s)/deciliter  glucagon  Injectable 1 milliGRAM(s) IntraMuscular once PRN Glucose LESS THAN 70 milligrams/deciliter    PE:    Constitutional: intubated and sedated   	Head: NC/AT  	Eyes: PERRL, EOMI, clear conjunctiva  	ENT: no nasal discharge; uvula midline, MMM, scab on tongue, ET tube in place  	Neck: supple; no JVD or thyromegaly  	Respiratory: diffuse rhonchi, no increased WOB  	Cardiac: +S1/S2; RRR; no M/R/G;   	Gastrointestinal: soft abdomen, nontender, w/o rebound or guarding, nephrostomy bag in place in abdomen, healing open abdominal wound over central abdomen w/ overlying bandage draining green fluid into a bag, NGT in place feeds held  	Extremities:  scaling grey skin in LEs, trace foot b/l edema, scds in place  	Musculoskeletal: NROM x4; no joint swelling, tenderness or erythema    skin: no bruises or rashes  SKIN: No rashes or lesions

## 2019-06-22 NOTE — PROGRESS NOTE ADULT - ATTENDING COMMENTS
I independently performed the key portions of the evaluation and management service provided. I agree with the above history, physical, and plan which I have reviewed and edited where appropriate. I find acidosis, HUGO, fluid overload. Follow SCr. Cont diuretics. See full note.

## 2019-06-22 NOTE — PROGRESS NOTE ADULT - ASSESSMENT
70 y/o M PMH smoker (50 years and current), sleep apnea w/o cpap, short-term mem loss, HTN, DM2, chronic back pain s/p multiple back surgeries w/ hardware on methadone, anxiety, SBO s/p sx c/b wound that never healed, prostate cancer 1999 s/p radiation c/b radical cystectomy and nephrostomy w/ ileal conduit (in remission), p/w SOB, found to have severe acidemia, sepsis 2/2 pyelonephritis vs PNA, new onset suspected lung cancer w/ mets, admitted to MICU.     Neurology  #TME  agitation/ anxiety hx/ short term mem loss hx, likely 2/2 uremia vs sepsis vs baseline per partner when has short term mem loss and gets confused and agitated, has been sedated until am 6/22  -monitor MS now that off sedation, prior to attempts to cpap trial and potential extubation  -c/w home sertraline when not sedated  -c/w Seroquel 50 bid, monitor qtc    Cardiac  #mild aortic aneurysm  dilation of the aortic root and ascending aorta on ct, no known hx, no AR on echo  -control sBP <150     #hypotension  likely 2/2 sedation and sepsis, lactate improved to 2.0, cannot assess true septic levo requirements until off sedation  -c/w levophed for now, titrate as BP tolerates    #htn  hx of htn  -currently on pressors - holding home anti-hypertensive medications    Pulmonary  #lung cancer w/ mets  CT chest/ abd w/o contrast due to cr 4.5 showing 7cm RLL mass concerning for neoplasm w/ suspected hepatic and adrenal mets and abd LAD on ct, no known hx of lung cancer per partner, never been treated as pt was unaware, chronic smoker but never screened with CT per partner, dopplers neg for dvt, bronchoscopy 6/17 showing RML mucous plugging, sent for sputum cx and cytology, GOC per partner- living will no definitive decisions but leaves all GOC decisions to partner, pal care consulted, pt DNR, -suspect PNA is post-obstructive pna 2/2 mass. bronchoscopy negative for malignant cells- HCP discussed with IR and agreed risk too high for biopsy  -pal care consulted, meeting next week for more goc discussion  -oncology consulted, f/u recs    #smoker   50 years and current, no recent ct scans  -nicotine patch prn when pt not sedated    #sleep apnea  offered cpap at home but did not use  -currently intubated  -cpap at night when off mech vent    #COPD  no dx, no inhalers, emphysema on CT scan of chest, suspect resp acidosis 2/2 COPD that is chronic and preventing compensation of met acidosis  -intubation to decrease WOB and increase RR if needed to compensate for metabolic acidosis  -duonebs q6 standing     #post obstructive PNA 2/2 suspected lung cancer  CT chest shows RLL PNA, pt p/w SOB w/ tachypnea and WBC 30, no hypoxia, no fevers, s/p vanc/ cefepime in ED--> vanc and zosyn--> vanc tito--> tito, dcd vanc 6/18, bcx ngtd, sputum cx ngtd  -daily cxr- unchanged  -c.w meropenam- 10d total, id approved    Renal  #HUGO on CKD  Cr 4.5 on admission, baseline cr 1.6 2/19 per PMD, cr improving to ~2.9, pt also with uremia that is downtrending, renal consulted and are monitoring but currently no HD required and pt making urine  -f/u renal recs  -monitor bmp  -possible HD in near future  -monitor UO   -renally dose medications    Metabolic  #metabolic acidosis   bicarb low, no AG, no diarrhea, cl high suggesting hyperchloremic etiology 2/2 NS, changed ns to d5 for meds and cl lowered mildly  -monitor bmp  -renal consult as above  -intubated as above for resp compensation (high vt and rr)  -c/w sodium bicarb po 650 tid, consider increasing  -keep meds in d5 not ns    ID  # sepsis 2/2 post-obstructive PNA  on admission wbc 30, w/ tachypnea to 22 meets 2/4 sirs criteria afebrile, lactate 2.6. cxr clear, ct chest RLL PNA and under mass. ct scan showing perinephritic stranding and mod hydronephrosis concerning for UTI/ nephritis UA+, but urince cx neg. pt w/ distended gallbladder w/ stones concerning for cholecystitis but  HIDA scan neg, blood and sputum and urine cultures no growth, wound care for skin wound over abdomen (doesnt look infected, from SBO s/p sx c/b wound that never healed, ) penile dc cx grows e faecalis sensitive to amp/vanc. pt with no culture data other than e fecalis in penile cx, covered by meropenam (alos covering for empiric PNA and pyelo), lactate nomrlaized to 2, WBC still elevated to 30 and pt still febrile, likely has post obstructive PNA without source control so continues to spike and have wbc  -monitor wbc, fever curve  -c/w tito since 6/15-6/25 for PNA, possible pyelo, and  penile infection (at least 7 day course, consider stopping 6/22)    Endocrine  #DM2  a1c 7.4, on home metformin, fsgs controlled  -ISS  -add insulin regimen if needed    MSK  #chronic back pain  chronic back pain s/p multiple back surgeries w/ hardware on methadone, utox  positive for methadone only which per partner pt gest from pain specialist Dr Kwan, takes  5mg 2-4 pills qd   -methadone 5-20 mg qd, higher end if needed for pain control when off sedation      #cystectomy with nephrostomy  ct showing mod hydro but draining urine, R kidney atrophy, prostate cancer 1999 s/p radiation c/b radical cystectomy and nephrostomy w/ ileal conduit (in remission)  -urology consulted, f.u recs  -monitor UO from nephrostomy bag    GI  #npo with tube feeds  -tube feeds through ngt    #constipation  hx of sbo, no sbo on ct abd, belly benign on exam, now s/p 2 BMs am 6/19 and 1 6/20  -bowel regimen  -if residuals in feeds >500, add reglan    Heme  anemia, normocytic, likely 2/2 hugo on ckd and acd, cbc stable no s./s of bleed  -monitor CBC    OTHER  F: none  E: replete PRN but cautious in Hugo in CKD  N: npo with tube feeds,  DVT ppx: HSQ 5000 q8, scds  other ppx: none required as pt on feeds  DNR, not DNI for now  dispo: MICU

## 2019-06-22 NOTE — PROGRESS NOTE ADULT - SUBJECTIVE AND OBJECTIVE BOX
Patient is a 69y Male seen and evaluated at bedside.   pt seen and examined  remains intubated- fio 2 @ 40 %, on levophed  last 24 hr fluid balance @ -2043.9     acetaminophen    Suspension .. 650 every 6 hours PRN  ALBUTerol/ipratropium for Nebulization 3 every 6 hours  amiodarone    Tablet 200 daily  chlorhexidine 0.12% Liquid 15 two times a day  chlorhexidine 4% Liquid 1 <User Schedule>  dextrose 40% Gel 15 once PRN  dextrose 5%. 1000 <Continuous>  dextrose 50% Injectable 12.5 once  dextrose 50% Injectable 25 once  dextrose 50% Injectable 25 once  fentaNYL   Infusion. 0.5 <Continuous>  glucagon  Injectable 1 once PRN  heparin  Injectable 7500 every 8 hours  insulin lispro (HumaLOG) corrective regimen sliding scale  every 6 hours  meropenem  IVPB 1000 every 12 hours  norepinephrine Infusion 0.05 <Continuous>  propofol Infusion 5 <Continuous>  QUEtiapine 50 every 12 hours  sodium bicarbonate 650 every 8 hours      Allergies    No Known Allergies    Intolerances        T(C): , Max: 39.5 (06-22-19 @ 10:30)  T(F): , Max: 103.1 (06-22-19 @ 10:30)  HR: 108 (06-22-19 @ 10:00)  BP: 100/56 (06-22-19 @ 10:00)  BP(mean): 68 (06-22-19 @ 10:00)  RR: 27 (06-22-19 @ 10:00)  SpO2: 97% (06-22-19 @ 10:00)  Wt(kg): --    06-21 @ 07:01  -  06-22 @ 07:00  --------------------------------------------------------  IN: 2091.2 mL / OUT: 4135 mL / NET: -2043.8 mL    06-22 @ 07:01  -  06-22 @ 11:12  --------------------------------------------------------  IN: 316 mL / OUT: 575 mL / NET: -259 mL          Review of Systems:  NA      PHYSICAL EXAM:  GENERAL: intubated, sedated  HEAD:  Atraumatic, Normocephalic,   EYES: Bilateral conjunctiva and sclera normal   Oral cavity: ET in place  NECK: Neck supple, No JVD  CHEST/LUNG: limited exam, decreased breath sounds   HEART: Regular rate and rhythm. KJ II/VI at LPSB, No gallop, no rub   ABDOMEN: urostomy bag connected to garcia cath, midline surgical dressing with bag containing greenish fluid   EXTREMITIES: No LE edema   Neurology: Sedated  SKIN: No rashes or lesions              LABS:                        10.2   29.55 )-----------( 399      ( 22 Jun 2019 07:52 )             33.7     06-21    139  |  109<H>  |  50<H>  ----------------------------<  209<H>  3.9   |  17<L>  |  2.44<H>    Ca    8.6      21 Jun 2019 23:00  Mg     1.7     06-21    TPro  6.6  /  Alb  2.0<L>  /  TBili  0.4  /  DBili  <0.2  /  AST  43<H>  /  ALT  16  /  AlkPhos  148<H>  06-22      PT/INR - ( 22 Jun 2019 05:21 )   PT: 14.2 sec;   INR: 1.25          PTT - ( 21 Jun 2019 02:33 )  PTT:35.3 sec          RADIOLOGY & ADDITIONAL STUDIES: Patient is a 69y Male seen and evaluated at bedside.   pt seen and examined  remains intubated- fio 2 @ 40 %, on levophed  last 24 hr fluid balance @ -2043.9     acetaminophen    Suspension .. 650 every 6 hours PRN  ALBUTerol/ipratropium for Nebulization 3 every 6 hours  amiodarone    Tablet 200 daily  chlorhexidine 0.12% Liquid 15 two times a day  chlorhexidine 4% Liquid 1 <User Schedule>  dextrose 40% Gel 15 once PRN  dextrose 5%. 1000 <Continuous>  dextrose 50% Injectable 12.5 once  dextrose 50% Injectable 25 once  dextrose 50% Injectable 25 once  fentaNYL   Infusion. 0.5 <Continuous>  glucagon  Injectable 1 once PRN  heparin  Injectable 7500 every 8 hours  insulin lispro (HumaLOG) corrective regimen sliding scale  every 6 hours  meropenem  IVPB 1000 every 12 hours  norepinephrine Infusion 0.05 <Continuous>  propofol Infusion 5 <Continuous>  QUEtiapine 50 every 12 hours  sodium bicarbonate 650 every 8 hours      Allergies    No Known Allergies    Intolerances        T(C): , Max: 39.5 (06-22-19 @ 10:30)  T(F): , Max: 103.1 (06-22-19 @ 10:30)  HR: 108 (06-22-19 @ 10:00)  BP: 100/56 (06-22-19 @ 10:00)  BP(mean): 68 (06-22-19 @ 10:00)  RR: 27 (06-22-19 @ 10:00)  SpO2: 97% (06-22-19 @ 10:00)  Wt(kg): --    06-21 @ 07:01  -  06-22 @ 07:00  --------------------------------------------------------  IN: 2091.2 mL / OUT: 4135 mL / NET: -2043.8 mL    06-22 @ 07:01  -  06-22 @ 11:12  --------------------------------------------------------  IN: 316 mL / OUT: 575 mL / NET: -259 mL          Review of Systems:  NA      PHYSICAL EXAM:  GENERAL: intubated, sedated  HEAD:  Atraumatic, Normocephalic,   EYES: Bilateral conjunctiva and sclera normal   Oral cavity: ET in place  NECK: Neck supple, No JVD  CHEST/LUNG: limited exam, decreased breath sounds   HEART: Regular rate and rhythm. KJ II/VI at LPSB, No gallop, no rub   ABDOMEN: urostomy bag connected to garcia cath, midline surgical dressing with bag containing greenish fluid   EXTREMITIES: trace LE edema  Neurology: Sedated  SKIN: No rashes or lesions              LABS:                        10.2   29.55 )-----------( 399      ( 22 Jun 2019 07:52 )             33.7     06-21    139  |  109<H>  |  50<H>  ----------------------------<  209<H>  3.9   |  17<L>  |  2.44<H>    Ca    8.6      21 Jun 2019 23:00  Mg     1.7     06-21    TPro  6.6  /  Alb  2.0<L>  /  TBili  0.4  /  DBili  <0.2  /  AST  43<H>  /  ALT  16  /  AlkPhos  148<H>  06-22      PT/INR - ( 22 Jun 2019 05:21 )   PT: 14.2 sec;   INR: 1.25          PTT - ( 21 Jun 2019 02:33 )  PTT:35.3 sec          RADIOLOGY & ADDITIONAL STUDIES:

## 2019-06-23 NOTE — PROGRESS NOTE ADULT - ASSESSMENT
68 y/o M PMH smoker (50 years and current), sleep apnea w/o cpap, short-term mem loss, HTN, DM2, chronic back pain s/p multiple back surgeries w/ hardware on methadone, anxiety, SBO s/p sx c/b wound that never healed, prostate cancer 1999 s/p radiation c/b radical cystectomy and nephrostomy w/ ileal conduit (in remission), p/w SOB, found to have severe acidemia, sepsis 2/2 pyelonephritis vs PNA, new onset suspected lung cancer w/ mets, admitted to MICU.     Neurology  #TME  agitation/ anxiety hx/ short term mem loss hx, likely 2/2 uremia vs sepsis vs baseline per partner when has short term mem loss and gets confused and agitated,  -became agitated and tachycardic off sedation. re-sedated w propofol at this time.   -c/w Seroquel 50 bid, monitor qtc    Cardiac  #mild aortic aneurysm  dilation of the aortic root and ascending aorta on ct, no known hx, no AR on echo  -control sBP <150     #hypotension  likely 2/2 sedation and sepsis, lactate improved to 2.0, cannot assess true septic levo requirements until off sedation  -increasing levo requirements (0.11 --> 0.139) in the setting of continued fevers and tachycardia. likely 2/2 septic shock    #htn  -currently on pressors - holding home anti-hypertensive medications    #fluid overload  - pt grossly overloaded on CXR.   - lasix 120IV and 10mg metolazone  - monitor I/Os    Pulmonary  #lung cancer w/ mets  -CT chest/ abd w/o contrast due to cr 4.5 showing 7cm RLL mass concerning for neoplasm w/ suspected hepatic and adrenal mets and abd LAD on ct, no known hx of lung cancer per partner, never been treated as pt was unaware, chronic smoker but never screened with CT per partner, dopplers neg for dvt, bronchoscopy 6/17 showing RML mucous plugging, sent for sputum cx and cytology, GOC per partner- living will no definitive decisions but leaves all GOC decisions to partner, pal care consulted, pt DNR, -suspect PNA is post-obstructive pna 2/2 mass. bronchoscopy negative for malignant cells- HCP discussed with IR and agreed risk too high for biopsy  -pal care consulted, meeting next week for more goc discussion  -oncology consulted, f/u recs    #smoker   50 years and current, no recent ct scans  -nicotine patch prn when pt not sedated    #sleep apnea  offered cpap at home but did not use  -currently intubated  -cpap at night when off mech vent    #COPD  no dx, no inhalers, emphysema on CT scan of chest, suspect resp acidosis 2/2 COPD that is chronic and preventing compensation of met acidosis  -intubation to decrease WOB and increase RR if needed to compensate for metabolic acidosis  -duonebs q6 standing     #post obstructive PNA 2/2 suspected lung cancer  CT chest shows RLL PNA, pt p/w SOB w/ tachypnea and WBC 30, no hypoxia, no fevers, s/p vanc/ cefepime in ED--> vanc and zosyn--> vanc tito--> tito, dcd vanc 6/18, bcx ngtd, sputum cx ngtd  -daily cxr- unchanged - no infiltrate appreciated but consistent b/l pulmonary edema/effusions  -c.w meropenem- 10d total, id approved  -restarted vanc 6/22 overnight due to fever 102 and tachycardia. could also have VAP at this time given >48 hours on ventilator  - vanc by level    Renal  #HUGO on CKD  Cr 4.5 on admission, baseline cr 1.6 2/19 per PMD, Cr stable at 2.29 today. improving. continues to make ~60cc/hr UOP. of note, cystostomy was off this AM so UOP higher than recorded.  -f/u renal recs  -monitor bmp  -possible HD in near future  -monitor UO   -renally dose medications    Metabolic  #metabolic acidosis   bicarb low, no AG, no diarrhea, cl high suggesting hyperchloremic etiology 2/2 NS, changed ns to d5 for meds and cl lowered mildly  -monitor bmp  -renal consult as above  -intubated as above for resp compensation (high vt and rr)  -c/w sodium bicarb po 650 tid, consider increasing  -keep meds in d5 not ns    ID  # sepsis 2/2 post-obstructive PNA  on admission wbc 30, w/ tachypnea to 22 meets 2/4 sirs criteria afebrile, lactate 2.6. cxr clear, ct chest RLL PNA and under mass. ct scan showing perinephritic stranding and mod hydronephrosis concerning for UTI/ nephritis UA+, but urince cx neg. pt w/ distended gallbladder w/ stones concerning for cholecystitis but  HIDA scan neg, blood and sputum and urine cultures no growth, wound care for skin wound over abdomen (doesnt look infected, from SBO s/p sx c/b wound that never healed, ) penile dc cx grows e faecalis sensitive to amp/vanc. pt with no culture data other than e fecalis in penile cx, covered by meropenam (alos covering for empiric PNA and pyelo), lactate nomrlaized to 2, WBC still elevated to 30 and pt still febrile, likely has post obstructive PNA without source control so continues to spike and have wbc  -monitor wbc, fever curve  -c/w tito since 6/15-6/25 for PNA, possible pyelo, and  penile infection   -vanc by level (6/22-)    Endocrine  #DM2  a1c 7.4, on home metformin, fsgs controlled  -ISS  -add insulin regimen if needed    MSK  #chronic back pain  chronic back pain s/p multiple back surgeries w/ hardware on methadone, utox  positive for methadone only which per partner pt gest from pain specialist Dr Kwan, takes  5mg 2-4 pills qd   -methadone 5-20 mg qd, higher end if needed for pain control when off sedation      #cystectomy with nephrostomy  ct showing mod hydro but draining dark brown urine, R kidney atrophy, prostate cancer 1999 s/p radiation c/b radical cystectomy and nephrostomy w/ ileal conduit (in remission)  -urology consulted, f.u recs  -monitor UO from nephrostomy bag    GI  #npo with tube feeds  -tube feeds through ngt    #constipation  hx of sbo, no sbo on ct abd, belly benign on exam, now s/p 2 BMs am 6/19 and 1 6/20  -bowel regimen  -if residuals in feeds >500, add reglan    Heme  anemia, normocytic, likely 2/2 hugo on ckd and acd, cbc stable no s./s of bleed  -monitor CBC    OTHER  F: none  E: replete PRN but cautious in Hugo in CKD  N: npo with tube feeds,  DVT ppx: HSQ 5000 q8, scds  other ppx: none required as pt on feeds  DNR, not DNI for now  dispo: MICU

## 2019-06-23 NOTE — PROGRESS NOTE ADULT - SUBJECTIVE AND OBJECTIVE BOX
Patient is a 69y Male seen and evaluated at bedside.   pt seen and examined  remains intubated on fio2 @ 40 %  on levophed @ 25     acetaminophen    Suspension .. 650 every 6 hours PRN  albumin human 25% IVPB 50 every 6 hours  ALBUTerol/ipratropium for Nebulization 3 every 6 hours  amiodarone    Tablet 200 daily  chlorhexidine 0.12% Liquid 15 two times a day  chlorhexidine 4% Liquid 1 <User Schedule>  dextrose 40% Gel 15 once PRN  dextrose 5%. 1000 <Continuous>  dextrose 50% Injectable 12.5 once  dextrose 50% Injectable 25 once  dextrose 50% Injectable 25 once  furosemide   IVPB 120 once  glucagon  Injectable 1 once PRN  heparin  Injectable 7500 every 8 hours  insulin lispro (HumaLOG) corrective regimen sliding scale  every 6 hours  meropenem  IVPB 1000 every 12 hours  metolazone 10 once  norepinephrine Infusion 0.05 <Continuous>  propofol Infusion 5 <Continuous>  QUEtiapine 50 every 12 hours  sodium bicarbonate 975 every 8 hours      Allergies    No Known Allergies    Intolerances        T(C): , Max: 40 (06-22-19 @ 14:22)  T(F): , Max: 104 (06-22-19 @ 14:22)  HR: 104 (06-23-19 @ 09:00)  BP: 115/48 (06-23-19 @ 09:00)  BP(mean): 71 (06-23-19 @ 09:00)  RR: 32 (06-23-19 @ 09:00)  SpO2: 97% (06-23-19 @ 09:00)  Wt(kg): --    06-22 @ 07:01  -  06-23 @ 07:00  --------------------------------------------------------  IN: 2396.8 mL / OUT: 2425 mL / NET: -28.2 mL    06-23 @ 07:01  -  06-23 @ 10:26  --------------------------------------------------------  IN: 105 mL / OUT: 5 mL / NET: 100 mL          Review of Systems:  NA    PHYSICAL EXAM:  GENERAL: intubated- fio2 @ 40 % , sedated  HEAD:  Atraumatic, Normocephalic,   EYES: Bilateral conjunctiva and sclera normal   Oral cavity: ET in place  NECK: Neck supple, No JVD  CHEST/LUNG: limited exam, decreased breath sounds   HEART: Regular rate and rhythm. KJ II/VI at LPSB, No gallop, no rub   ABDOMEN: urostomy bag connected to garcia cath, midline surgical dressing with bag containing greenish fluid   EXTREMITIES: trace LE edema  Neurology: Sedated  SKIN: No rashes or lesions          LABS:                        9.5    31.54 )-----------( 423      ( 23 Jun 2019 05:51 )             30.9     06-23    138  |  106  |  49<H>  ----------------------------<  155<H>  3.5   |  18<L>  |  2.29<H>    Ca    8.4      23 Jun 2019 05:51  Phos  3.4     06-23  Mg     2.0     06-23    TPro  6.6  /  Alb  2.2<L>  /  TBili  0.3  /  DBili  x   /  AST  35  /  ALT  14  /  AlkPhos  133<H>  06-23      PT/INR - ( 22 Jun 2019 05:21 )   PT: 14.2 sec;   INR: 1.25                    RADIOLOGY & ADDITIONAL STUDIES:

## 2019-06-23 NOTE — PROGRESS NOTE ADULT - SUBJECTIVE AND OBJECTIVE BOX
OVERNIGHT EVENTS:    SUBJECTIVE / INTERVAL HPI: Patient seen and examined at bedside.     VITAL SIGNS:  Vital Signs Last 24 Hrs  T(C): 38.6 (23 Jun 2019 07:00), Max: 40 (22 Jun 2019 14:22)  T(F): 101.5 (23 Jun 2019 07:00), Max: 104 (22 Jun 2019 14:22)  HR: 114 (23 Jun 2019 08:00) (96 - 144)  BP: 93/59 (23 Jun 2019 08:00) (85/47 - 138/51)  BP(mean): 70 (23 Jun 2019 08:00) (60 - 94)  RR: 28 (23 Jun 2019 08:00) (25 - 272)  SpO2: 96% (23 Jun 2019 08:00) (96% - 99%)    PHYSICAL EXAM:    General: WDWN  HEENT: NC/AT; PERRL, anicteric sclera; MMM  Neck: supple  Cardiovascular: +S1/S2, RRR  Respiratory: CTA B/L; no W/R/R  Gastrointestinal: soft, NT/ND; +BSx4  Extremities: WWP; no edema, clubbing or cyanosis  Vascular: 2+ radial, DP/PT pulses B/L  Neurological: AAOx3; no focal deficits    MEDICATIONS:  MEDICATIONS  (STANDING):  ALBUTerol/ipratropium for Nebulization 3 milliLiter(s) Nebulizer every 6 hours  amiodarone    Tablet 200 milliGRAM(s) Oral daily  chlorhexidine 0.12% Liquid 15 milliLiter(s) Oral Mucosa two times a day  chlorhexidine 4% Liquid 1 Application(s) Topical <User Schedule>  dextrose 5%. 1000 milliLiter(s) (50 mL/Hr) IV Continuous <Continuous>  dextrose 50% Injectable 12.5 Gram(s) IV Push once  dextrose 50% Injectable 25 Gram(s) IV Push once  dextrose 50% Injectable 25 Gram(s) IV Push once  heparin  Injectable 7500 Unit(s) SubCutaneous every 8 hours  insulin lispro (HumaLOG) corrective regimen sliding scale   SubCutaneous every 6 hours  meropenem  IVPB 1000 milliGRAM(s) IV Intermittent every 12 hours  norepinephrine Infusion 0.05 MICROgram(s)/kG/Min (8.972 mL/Hr) IV Continuous <Continuous>  propofol Infusion 5 MICROgram(s)/kG/Min (2.871 mL/Hr) IV Continuous <Continuous>  QUEtiapine 50 milliGRAM(s) Oral every 12 hours  sodium bicarbonate 975 milliGRAM(s) Oral every 8 hours    MEDICATIONS  (PRN):  acetaminophen    Suspension .. 650 milliGRAM(s) Oral every 6 hours PRN Temp greater or equal to 38C (100.4F)  dextrose 40% Gel 15 Gram(s) Oral once PRN Blood Glucose LESS THAN 70 milliGRAM(s)/deciliter  glucagon  Injectable 1 milliGRAM(s) IntraMuscular once PRN Glucose LESS THAN 70 milligrams/deciliter      ALLERGIES:  Allergies    No Known Allergies    Intolerances        LABS:                        9.5    31.54 )-----------( 423      ( 23 Jun 2019 05:51 )             30.9     06-23    138  |  106  |  49<H>  ----------------------------<  155<H>  3.5   |  18<L>  |  2.29<H>    Ca    8.4      23 Jun 2019 05:51  Phos  3.4     06-23  Mg     2.0     06-23    TPro  6.6  /  Alb  2.2<L>  /  TBili  0.3  /  DBili  x   /  AST  35  /  ALT  14  /  AlkPhos  133<H>  06-23    PT/INR - ( 22 Jun 2019 05:21 )   PT: 14.2 sec;   INR: 1.25              CAPILLARY BLOOD GLUCOSE      POCT Blood Glucose.: 158 mg/dL (23 Jun 2019 05:29)      RADIOLOGY & ADDITIONAL TESTS: Reviewed. OVERNIGHT EVENTS: agitated and tachycardic. given fentanyl 50 x2 and versed 2. subsequently started propofol. febrile to 102: given tylenol and 1g vancomcyin    SUBJECTIVE / INTERVAL HPI: Patient seen and examined at bedside. intubated and sedated    VITAL SIGNS:  Vital Signs Last 24 Hrs  T(C): 38.6 (23 Jun 2019 07:00), Max: 40 (22 Jun 2019 14:22)  T(F): 101.5 (23 Jun 2019 07:00), Max: 104 (22 Jun 2019 14:22)  HR: 114 (23 Jun 2019 08:00) (96 - 144)  BP: 93/59 (23 Jun 2019 08:00) (85/47 - 138/51)  BP(mean): 70 (23 Jun 2019 08:00) (60 - 94)  RR: 28 (23 Jun 2019 08:00) (25 - 272)  SpO2: 96% (23 Jun 2019 08:00) (96% - 99%)    PHYSICAL EXAM:    General: intubated and sedated  HEENT: NC/AT; PERRL, anicteric sclera; MMM; ETT and NGT in place  Neck: supple  Cardiovascular: +S1/S2, tachycardic  Respiratory: CTA B/L; no W/R/R  Gastrointestinal: soft but distended abdomen. notender. nephrostomy draining dark green liquid. cystostomy draining dark brown liquid. nonhealing scab on abdomen w some bleeding  Extremities: b/l LE edema to ankles  Vascular: 2+ radial, DP/PT pulses B/L  Neurological: sedated    MEDICATIONS:  MEDICATIONS  (STANDING):  ALBUTerol/ipratropium for Nebulization 3 milliLiter(s) Nebulizer every 6 hours  amiodarone    Tablet 200 milliGRAM(s) Oral daily  chlorhexidine 0.12% Liquid 15 milliLiter(s) Oral Mucosa two times a day  chlorhexidine 4% Liquid 1 Application(s) Topical <User Schedule>  dextrose 5%. 1000 milliLiter(s) (50 mL/Hr) IV Continuous <Continuous>  dextrose 50% Injectable 12.5 Gram(s) IV Push once  dextrose 50% Injectable 25 Gram(s) IV Push once  dextrose 50% Injectable 25 Gram(s) IV Push once  heparin  Injectable 7500 Unit(s) SubCutaneous every 8 hours  insulin lispro (HumaLOG) corrective regimen sliding scale   SubCutaneous every 6 hours  meropenem  IVPB 1000 milliGRAM(s) IV Intermittent every 12 hours  norepinephrine Infusion 0.05 MICROgram(s)/kG/Min (8.972 mL/Hr) IV Continuous <Continuous>  propofol Infusion 5 MICROgram(s)/kG/Min (2.871 mL/Hr) IV Continuous <Continuous>  QUEtiapine 50 milliGRAM(s) Oral every 12 hours  sodium bicarbonate 975 milliGRAM(s) Oral every 8 hours    MEDICATIONS  (PRN):  acetaminophen    Suspension .. 650 milliGRAM(s) Oral every 6 hours PRN Temp greater or equal to 38C (100.4F)  dextrose 40% Gel 15 Gram(s) Oral once PRN Blood Glucose LESS THAN 70 milliGRAM(s)/deciliter  glucagon  Injectable 1 milliGRAM(s) IntraMuscular once PRN Glucose LESS THAN 70 milligrams/deciliter      ALLERGIES:  Allergies    No Known Allergies    Intolerances        LABS:                        9.5    31.54 )-----------( 423      ( 23 Jun 2019 05:51 )             30.9     06-23    138  |  106  |  49<H>  ----------------------------<  155<H>  3.5   |  18<L>  |  2.29<H>    Ca    8.4      23 Jun 2019 05:51  Phos  3.4     06-23  Mg     2.0     06-23    TPro  6.6  /  Alb  2.2<L>  /  TBili  0.3  /  DBili  x   /  AST  35  /  ALT  14  /  AlkPhos  133<H>  06-23    PT/INR - ( 22 Jun 2019 05:21 )   PT: 14.2 sec;   INR: 1.25              CAPILLARY BLOOD GLUCOSE      POCT Blood Glucose.: 158 mg/dL (23 Jun 2019 05:29)      RADIOLOGY & ADDITIONAL TESTS: Reviewed.  CXR rotated and underpenetrated but w consistent b/l pulmonary edema and b/l pleural effusions

## 2019-06-23 NOTE — PROGRESS NOTE ADULT - ASSESSMENT
patient is a 69 y o White male with PMH of unknown baseline cr, HTN, chronic back pain, s/p radical cystectomy who was admitted for severe acidosis, and PO, HEnce nephrology consult.    PO  non oliguric   DDX: ATN from sepsis   cr dwon @ 2.29  volume status remains wet--> agree with lasix 120 mg IV daily to achieve euvolemic state  can add metolazone to increase diuresis  renally dose abx  monitor I/o  keep MAP > 65      acidosis  DDX Po  recommend increasing sodium bicarbonate to 1300 mg po tid    Anemia  check iron studies  recommend transfusion if Hb < 7

## 2019-06-24 NOTE — PROGRESS NOTE ADULT - ATTENDING COMMENTS
Patient continues with fever likely from continued postobstructive pna in setting of what looks like metastatic cancer. Weaning not possible as he gets SOB with decreases in sedation and apparent delirium has been unresponsive to quetiapine and precedex.   Aggressive care will require trach/PEG. Chances of cure or palliation remain poor. This has been presented to his HCP.

## 2019-06-24 NOTE — PROGRESS NOTE ADULT - SUBJECTIVE AND OBJECTIVE BOX
********INCOMPLETE********    JANIE ZABALA   [id number]         CC: Patient is a 69y old  Male who presents with a chief complaint of acidemia (24 Jun 2019 12:22)    HPI:  70 y/o M PMH HTN, smoker, chronic back pain, s/p radical cystectomy w/ one remaining kidney w/ nephrostomy /w SOB x 3 days. Pt denies fever, cough, CP. Pt in pain in R back. No belly pain. Could not provide hx of when had procedures or smoking hx.    In the ED, pt afebrile, HR 72, satting 100% on 3L NC, RR 22-->18, BP 90/60-->120s. Labs significant for WBC 30, K 5.7, bicarb 11, AG 20, VBG pH 7.12-->7.08, CO2 40, lactate 2.6, BUN ~70, Cr ~4, unknown baseline. Carboxyhemoglobin slightly elevated at 2.6. CXR clear. CT chest abd concerning for metastatic lung cancer, and possible cholecystitis and nephritis w/ hydro. S/p vanc, cefepime, ketamine, bicarb drip 150/hr, insulin/ d50, LR 1 L bolus. Transferred to [facility] MICU for emergent HD and further care. On arrival, renal and urology consulted. Pt putting out urine into nephrostomy tube and abg improved so no urgent HD, but renal will monitor and start HD if needed. Pt started on vanc/ zosyn for pyelonephritis and broad coverage while r/o pna and cholecystitis, or infected surgical wound. (13 Jun 2019 15:29)    SUBJECTIVE:  ROS:  UNABLE TO OBTAIN  due to:    DYSPNEA (Y/N):	  N/V (Y/N):	  SECRETIONS (Y/N):	  AGITATION (Y/N):  PAIN(Y/N):        -Provocation/Palliation:     -Quality/Quantity:     -Radiating:     -Severity:     -Timing/Frequency:     -Impact on ADLs:  GENERAL:    HEENT:      	  NECK:           CVS:           	  RESP:        	  GI:             	  :            	  MUSC:       	  NEURO:     	  PSYCH:        PHYSICAL EXAM:  T(C): 39.1 (06-24-19 @ 12:00), Max: 39.1 (06-24-19 @ 12:00)  T(F): 102.4 (06-24-19 @ 12:00), Max: 102.4 (06-24-19 @ 12:00)  HR: 110 (06-24-19 @ 12:26) (81 - 152)  BP: 104/58 (06-24-19 @ 12:00) (75/47 - 115/63)  RR: 27 (06-24-19 @ 12:26) (23 - 30)  SpO2: 97% (06-24-19 @ 12:26) (95% - 100%)  Wt(kg): --  GENERAL:    HEENT:      	  NECK:           CVS:           	  RESP:        	  GI:             	  :            	  MUSC:       	  NEURO:     	  PSYCH:          SKIN:         	   LYMPH:         ALLERGIES:  No Known Allergies      OPIATE NAÏVE (Y/N):  -iStop reviewed (Y/N): Y    MEDICATIONS: reviewed  MEDICATIONS  (STANDING):  ALBUTerol/ipratropium for Nebulization 3 milliLiter(s) Nebulizer every 6 hours  amiodarone    Tablet 200 milliGRAM(s) Oral daily  chlorhexidine 0.12% Liquid 15 milliLiter(s) Oral Mucosa two times a day  chlorhexidine 4% Liquid 1 Application(s) Topical <User Schedule>  dextrose 5%. 1000 milliLiter(s) (50 mL/Hr) IV Continuous <Continuous>  dextrose 50% Injectable 12.5 Gram(s) IV Push once  dextrose 50% Injectable 25 Gram(s) IV Push once  dextrose 50% Injectable 25 Gram(s) IV Push once  furosemide Infusion 20 mG/Hr (10 mL/Hr) IV Continuous <Continuous>  heparin  Injectable 7500 Unit(s) SubCutaneous every 8 hours  insulin lispro (HumaLOG) corrective regimen sliding scale   SubCutaneous every 6 hours  meropenem  IVPB 1000 milliGRAM(s) IV Intermittent every 12 hours  metoprolol tartrate Injectable 2.5 milliGRAM(s) IV Push every 6 hours  norepinephrine Infusion 0.05 MICROgram(s)/kG/Min (8.972 mL/Hr) IV Continuous <Continuous>  propofol Infusion 5 MICROgram(s)/kG/Min (2.871 mL/Hr) IV Continuous <Continuous>  QUEtiapine 50 milliGRAM(s) Oral every 12 hours  sodium bicarbonate 975 milliGRAM(s) Oral every 8 hours    MEDICATIONS  (PRN):  acetaminophen    Suspension .. 650 milliGRAM(s) Oral every 6 hours PRN Temp greater or equal to 38C (100.4F)  dextrose 40% Gel 15 Gram(s) Oral once PRN Blood Glucose LESS THAN 70 milliGRAM(s)/deciliter  glucagon  Injectable 1 milliGRAM(s) IntraMuscular once PRN Glucose LESS THAN 70 milligrams/deciliter      LABS: reviewed                        9.7    35.89 )-----------( 461      ( 24 Jun 2019 04:43 )             31.0     06-24    138  |  104  |  57<H>  ----------------------------<  141<H>  4.6   |  19<L>  |  2.31<H>    Ca    8.7      24 Jun 2019 04:43  Phos  4.0     06-24  Mg     2.2     06-24    TPro  7.0  /  Alb  2.5<L>  /  TBili  0.3  /  DBili  <0.2  /  AST  40  /  ALT  17  /  AlkPhos  150<H>  06-24    LIVER FUNCTIONS - ( 24 Jun 2019 04:43 )  Alb: 2.5 g/dL / Pro: 7.0 g/dL / ALK PHOS: 150 U/L / ALT: 17 U/L / AST: 40 U/L / GGT: x               IMAGING: reviewed    ADVANCE DIRECTIVES:  DNR   Living Will   Decision maker:  Legal surrogate:    PSYCHOSOCIAL-SPIRITUAL ASSESSMENT:  Reviewed  Care plan unchanged      GOALS OF CARE DISCUSSION:  Palliative care info/counseling provided	      Family meeting        See previous Palliative Medicine Note  Documentation of GOC:     AGENCY CHOICE DISCUSSED:             REFERRALS:	   Palliative Med   Unit SW/Case Mgmt  -declined  Nutrition  PT/OT    [ ]CRITICAL CARE TIME PROVIDED TO UNSTABLE PT W/ ORGAN FAILURE            [x]> 50% OF THE TIME SPENT IN COUNSELING AND COORDINATING CARE     [ ]PROLONGED SERVICE       FACE TO FACE: [x]PT     [ ]PT & FAMILY    Start:               End:  	       Minutes: JANIE ZABALA   [id number]         CC: Patient is a 69y old  Male who presents with a chief complaint of acidemia (2019 12:22)    HPI:  68 y/o M PMH HTN, smoker, chronic back pain, s/p radical cystectomy w/ one remaining kidney w/ nephrostomy /w SOB x 3 days. Pt denies fever, cough, CP. Pt in pain in R back. No belly pain. Could not provide hx of when had procedures or smoking hx.    In the ED, pt afebrile, HR 72, satting 100% on 3L NC, RR 22-->18, BP 90/60-->120s. Labs significant for WBC 30, K 5.7, bicarb 11, AG 20, VBG pH 7.12-->7.08, CO2 40, lactate 2.6, BUN ~70, Cr ~4, unknown baseline. Carboxyhemoglobin slightly elevated at 2.6. CXR clear. CT chest abd concerning for metastatic lung cancer, and possible cholecystitis and nephritis w/ hydro. S/p vanc, cefepime, ketamine, bicarb drip 150/hr, insulin/ d50, LR 1 L bolus. Transferred to [facility] MICU for emergent HD and further care. On arrival, renal and urology consulted. Pt putting out urine into nephrostomy tube and abg improved so no urgent HD, but renal will monitor and start HD if needed. Pt started on vanc/ zosyn for pyelonephritis and broad coverage while r/o pna and cholecystitis, or infected surgical wound. (2019 15:29)    SUBJECTIVE: Remains febrile with cooling blanket with slight increase of pressors since last seen  ROS:  UNABLE TO OBTAIN  due to: sedated on vent    DYSPNEA (Y/N):	  N/V (Y/N):	  SECRETIONS (Y/N):	  AGITATION (Y/N):  PAIN(Y/N):        -Provocation/Palliation:     -Quality/Quantity:     -Radiating:     -Severity:     -Timing/Frequency:     -Impact on ADLs:  PHYSICAL EXAM:  T(C): 39.1 (19 @ 12:00), Max: 39.1 (19 @ 12:00)  T(F): 102.4 (19 @ 12:00), Max: 102.4 (19 @ 12:00)  HR: 110 (19 @ 12:26) (81 - 152)  BP: 104/58 (19 @ 12:00) (75/47 - 115/63)  RR: 27 (19 @ 12:26) (23 - 30)  SpO2: 97% (19 @ 12:26) (95% - 100%)    GENERAL: Sick looking morbidly obese gentleman with partner at bedside   HEENT: no spont. eye opening     	          CVS: SR on ECG          	  RESP: ETT to CMV on vent       	  GI: NGT with tube feeds             	  : ileal conduit           	  MUSC: no spont. mov't to extremities      	  NEURO: sedated on diprivan     	  PSYCH:  sedated on diprivan            ALLERGIES:  No Known Allergies    OPIATE NAÏVE (Y/N): n (methadone)  -iStop reviewed (Y/N): Y    MEDICATIONS: reviewed  MEDICATIONS  (STANDING):  ALBUTerol/ipratropium for Nebulization 3 milliLiter(s) Nebulizer every 6 hours  amiodarone    Tablet 200 milliGRAM(s) Oral daily  chlorhexidine 0.12% Liquid 15 milliLiter(s) Oral Mucosa two times a day  chlorhexidine 4% Liquid 1 Application(s) Topical <User Schedule>  dextrose 5%. 1000 milliLiter(s) (50 mL/Hr) IV Continuous <Continuous>  dextrose 50% Injectable 12.5 Gram(s) IV Push once  dextrose 50% Injectable 25 Gram(s) IV Push once  dextrose 50% Injectable 25 Gram(s) IV Push once  furosemide Infusion 20 mG/Hr (10 mL/Hr) IV Continuous <Continuous>  heparin  Injectable 7500 Unit(s) SubCutaneous every 8 hours  insulin lispro (HumaLOG) corrective regimen sliding scale   SubCutaneous every 6 hours  meropenem  IVPB 1000 milliGRAM(s) IV Intermittent every 12 hours  metoprolol tartrate Injectable 2.5 milliGRAM(s) IV Push every 6 hours  norepinephrine Infusion 0.05 MICROgram(s)/kG/Min (8.972 mL/Hr) IV Continuous <Continuous>  propofol Infusion 5 MICROgram(s)/kG/Min (2.871 mL/Hr) IV Continuous <Continuous>  QUEtiapine 50 milliGRAM(s) Oral every 12 hours  sodium bicarbonate 975 milliGRAM(s) Oral every 8 hours    MEDICATIONS  (PRN):  acetaminophen    Suspension .. 650 milliGRAM(s) Oral every 6 hours PRN Temp greater or equal to 38C (100.4F)  dextrose 40% Gel 15 Gram(s) Oral once PRN Blood Glucose LESS THAN 70 milliGRAM(s)/deciliter  glucagon  Injectable 1 milliGRAM(s) IntraMuscular once PRN Glucose LESS THAN 70 milligrams/deciliter    LABS: reviewed                        9.7    35.89 )-----------( 461      ( 2019 04:43 )             31.0     06-24    138  |  104  |  57<H>  ----------------------------<  141<H>  4.6   |  19<L>  |  2.31<H>    Ca    8.7      2019 04:43  Phos  4.0     06-24  Mg     2.2     06-24    TPro  7.0  /  Alb  2.5<L>  /  TBili  0.3  /  DBili  <0.2  /  AST  40  /  ALT  17  /  AlkPhos  150<H>  -24    LIVER FUNCTIONS - ( 2019 04:43 )  Alb: 2.5 g/dL / Pro: 7.0 g/dL / ALK PHOS: 150 U/L / ALT: 17 U/L / AST: 40 U/L / GGT: x           IMAGING: reviewed    ADVANCE DIRECTIVES:  DNR   Living Will   Decision maker: pt without capacity to make med decisions at this time, primary HCP is partner Vinod Bo  Legal surrogate: alt HCP on pt's Living Will is  sister Vaughn Harper, second alt HCP is friend Candelaria Antonio [phone] (c), [phone] (H)    PSYCHOSOCIAL-SPIRITUAL ASSESSMENT:  Reviewed  Care plan unchanged    GOALS OF CARE DISCUSSION:  Palliative care info/counseling provided	      Family meeting        See previous Palliative Medicine Note  Documentation of GOC:     AGENCY CHOICE DISCUSSED:             REFERRALS:	   Palliative Med   Unit SW/Case Mgmt  -declined  Nutrition  PT/OT    [ ]CRITICAL CARE TIME PROVIDED TO UNSTABLE PT W/ ORGAN FAILURE            [x]> 50% OF THE TIME SPENT IN COUNSELING AND COORDINATING CARE     [ ]PROLONGED SERVICE       FACE TO FACE: [x]PT     [ ]PT & FAMILY    Start:               End:  	       Minutes: JANIE ZABALA   [id number]         CC: Patient is a 69y old  Male who presents with a chief complaint of acidemia (2019 12:22)    HPI:  70 y/o M PMH HTN, smoker, chronic back pain, s/p radical cystectomy w/ one remaining kidney w/ nephrostomy /w SOB x 3 days. Pt denies fever, cough, CP. Pt in pain in R back. No belly pain. Could not provide hx of when had procedures or smoking hx.    In the ED, pt afebrile, HR 72, satting 100% on 3L NC, RR 22-->18, BP 90/60-->120s. Labs significant for WBC 30, K 5.7, bicarb 11, AG 20, VBG pH 7.12-->7.08, CO2 40, lactate 2.6, BUN ~70, Cr ~4, unknown baseline. Carboxyhemoglobin slightly elevated at 2.6. CXR clear. CT chest abd concerning for metastatic lung cancer, and possible cholecystitis and nephritis w/ hydro. S/p vanc, cefepime, ketamine, bicarb drip 150/hr, insulin/ d50, LR 1 L bolus. Transferred to [facility] MICU for emergent HD and further care. On arrival, renal and urology consulted. Pt putting out urine into nephrostomy tube and abg improved so no urgent HD, but renal will monitor and start HD if needed. Pt started on vanc/ zosyn for pyelonephritis and broad coverage while r/o pna and cholecystitis, or infected surgical wound. (2019 15:29)    SUBJECTIVE: Remains febrile with cooling blanket with slight increase of pressors since last seen  ROS:  UNABLE TO OBTAIN  due to: sedated on vent    DYSPNEA (Y/N):	  N/V (Y/N):	  SECRETIONS (Y/N):	  AGITATION (Y/N):  PAIN(Y/N):        -Provocation/Palliation:     -Quality/Quantity:     -Radiating:     -Severity:     -Timing/Frequency:     -Impact on ADLs:  PHYSICAL EXAM:  T(C): 39.1 (19 @ 12:00), Max: 39.1 (19 @ 12:00)  T(F): 102.4 (19 @ 12:00), Max: 102.4 (19 @ 12:00)  HR: 110 (19 @ 12:26) (81 - 152)  BP: 104/58 (19 @ 12:00) (75/47 - 115/63)  RR: 27 (19 @ 12:26) (23 - 30)  SpO2: 97% (19 @ 12:26) (95% - 100%)    GENERAL: Sick looking morbidly obese gentleman with partner at bedside   HEENT: no spont. eye opening     	          CVS: SR on ECG          	  RESP: ETT to CMV on vent       	  GI: NGT with tube feeds             	  : ileal conduit           	  MUSC: no spont. mov't to extremities      	  NEURO: sedated on diprivan     	  PSYCH:  sedated on diprivan            ALLERGIES:  No Known Allergies    OPIATE NAÏVE (Y/N): n (methadone)  -iStop reviewed (Y/N): Y    MEDICATIONS: reviewed  MEDICATIONS  (STANDING):  ALBUTerol/ipratropium for Nebulization 3 milliLiter(s) Nebulizer every 6 hours  amiodarone    Tablet 200 milliGRAM(s) Oral daily  chlorhexidine 0.12% Liquid 15 milliLiter(s) Oral Mucosa two times a day  chlorhexidine 4% Liquid 1 Application(s) Topical <User Schedule>  dextrose 5%. 1000 milliLiter(s) (50 mL/Hr) IV Continuous <Continuous>  dextrose 50% Injectable 12.5 Gram(s) IV Push once  dextrose 50% Injectable 25 Gram(s) IV Push once  dextrose 50% Injectable 25 Gram(s) IV Push once  furosemide Infusion 20 mG/Hr (10 mL/Hr) IV Continuous <Continuous>  heparin  Injectable 7500 Unit(s) SubCutaneous every 8 hours  insulin lispro (HumaLOG) corrective regimen sliding scale   SubCutaneous every 6 hours  meropenem  IVPB 1000 milliGRAM(s) IV Intermittent every 12 hours  metoprolol tartrate Injectable 2.5 milliGRAM(s) IV Push every 6 hours  norepinephrine Infusion 0.05 MICROgram(s)/kG/Min (8.972 mL/Hr) IV Continuous <Continuous>  propofol Infusion 5 MICROgram(s)/kG/Min (2.871 mL/Hr) IV Continuous <Continuous>  QUEtiapine 50 milliGRAM(s) Oral every 12 hours  sodium bicarbonate 975 milliGRAM(s) Oral every 8 hours    MEDICATIONS  (PRN):  acetaminophen    Suspension .. 650 milliGRAM(s) Oral every 6 hours PRN Temp greater or equal to 38C (100.4F)  dextrose 40% Gel 15 Gram(s) Oral once PRN Blood Glucose LESS THAN 70 milliGRAM(s)/deciliter  glucagon  Injectable 1 milliGRAM(s) IntraMuscular once PRN Glucose LESS THAN 70 milligrams/deciliter    LABS: reviewed                        9.7    35.89 )-----------( 461      ( 2019 04:43 )             31.0     06-24    138  |  104  |  57<H>  ----------------------------<  141<H>  4.6   |  19<L>  |  2.31<H>    Ca    8.7      2019 04:43  Phos  4.0     06-24  Mg     2.2     06-24    TPro  7.0  /  Alb  2.5<L>  /  TBili  0.3  /  DBili  <0.2  /  AST  40  /  ALT  17  /  AlkPhos  150<H>  -24    LIVER FUNCTIONS - ( 2019 04:43 )  Alb: 2.5 g/dL / Pro: 7.0 g/dL / ALK PHOS: 150 U/L / ALT: 17 U/L / AST: 40 U/L / GGT: x           IMAGING: reviewed    ADVANCE DIRECTIVES:  DNR   Living Will   Decision maker: pt without capacity to make med decisions at this time, primary HCP is partner Vinod Bo  Legal surrogate: alt HCP on pt's Living Will is  sister Vaughn Harper, second alt HCP is friend Candelaria Antonio [phone] (c), [phone] (H)    PSYCHOSOCIAL-SPIRITUAL ASSESSMENT:  Reviewed  Care plan unchanged    GOALS OF CARE DISCUSSION:  Palliative care info/counseling provided	      Family meeting     See previous Palliative Medicine Note  Documentation of GOC: cont. current treatment while HCP speaks with pt's 3 cousins    AGENCY CHOICE DISCUSSED:             REFERRALS:	   Palliative Med   Unit SW/Case Mgmt  -declined  Nutrition  PT/OT    [ ]CRITICAL CARE TIME PROVIDED TO UNSTABLE PT W/ ORGAN FAILURE            [x]> 50% OF THE TIME SPENT IN COUNSELING AND COORDINATING CARE     [ ]PROLONGED SERVICE       FACE TO FACE: [x]PT     [ ]PT & FAMILY    Start:               End:  	       Minutes:

## 2019-06-24 NOTE — PROGRESS NOTE ADULT - SUBJECTIVE AND OBJECTIVE BOX
Patient is a 69y old  Male who presents with a chief complaint of acidemia (24 Jun 2019 10:33)      INTERVAL HPI/OVERNIGHT EVENTS: Pt seen and examined at bedside. Intubated and sedated.      ICU Vital Signs Last 24 Hrs  T(C): 39.1 (24 Jun 2019 12:00), Max: 39.1 (24 Jun 2019 12:00)  T(F): 102.4 (24 Jun 2019 12:00), Max: 102.4 (24 Jun 2019 12:00)  HR: 120 (24 Jun 2019 12:00) (81 - 152)  BP: 104/58 (24 Jun 2019 12:00) (75/47 - 115/63)  BP(mean): 84 (24 Jun 2019 12:00) (56 - 84)  ABP: --  ABP(mean): --  RR: 28 (24 Jun 2019 12:00) (23 - 30)  SpO2: 97% (24 Jun 2019 12:00) (95% - 100%)    I&O's Summary    23 Jun 2019 07:01  -  24 Jun 2019 07:00  --------------------------------------------------------  IN: 3002.7 mL / OUT: 3500 mL / NET: -497.3 mL    24 Jun 2019 07:01  -  24 Jun 2019 12:23  --------------------------------------------------------  IN: 459 mL / OUT: 1030 mL / NET: -571 mL      Mode: AC/ CMV (Assist Control/ Continuous Mandatory Ventilation)  RR (machine): 18  TV (machine): 550  FiO2: 40  PEEP: 5  ITime: 1  MAP: 13  PIP: 23      LABS:                        9.7    35.89 )-----------( 461      ( 24 Jun 2019 04:43 )             31.0     06-24    138  |  104  |  57<H>  ----------------------------<  141<H>  4.6   |  19<L>  |  2.31<H>    Ca    8.7      24 Jun 2019 04:43  Phos  4.0     06-24  Mg     2.2     06-24    TPro  7.0  /  Alb  2.5<L>  /  TBili  0.3  /  DBili  <0.2  /  AST  40  /  ALT  17  /  AlkPhos  150<H>  06-24        CAPILLARY BLOOD GLUCOSE      POCT Blood Glucose.: 147 mg/dL (24 Jun 2019 06:02)  POCT Blood Glucose.: 175 mg/dL (23 Jun 2019 23:11)  POCT Blood Glucose.: 195 mg/dL (23 Jun 2019 17:21)        RADIOLOGY & ADDITIONAL TESTS:    Consultant(s) Notes Reviewed:  [x ] YES  [ ] NO    MEDICATIONS  (STANDING):  ALBUTerol/ipratropium for Nebulization 3 milliLiter(s) Nebulizer every 6 hours  amiodarone    Tablet 200 milliGRAM(s) Oral daily  chlorhexidine 0.12% Liquid 15 milliLiter(s) Oral Mucosa two times a day  chlorhexidine 4% Liquid 1 Application(s) Topical <User Schedule>  dextrose 5%. 1000 milliLiter(s) (50 mL/Hr) IV Continuous <Continuous>  dextrose 50% Injectable 12.5 Gram(s) IV Push once  dextrose 50% Injectable 25 Gram(s) IV Push once  dextrose 50% Injectable 25 Gram(s) IV Push once  furosemide Infusion 20 mG/Hr (10 mL/Hr) IV Continuous <Continuous>  heparin  Injectable 7500 Unit(s) SubCutaneous every 8 hours  insulin lispro (HumaLOG) corrective regimen sliding scale   SubCutaneous every 6 hours  meropenem  IVPB 1000 milliGRAM(s) IV Intermittent every 12 hours  metoprolol tartrate Injectable 2.5 milliGRAM(s) IV Push every 6 hours  norepinephrine Infusion 0.05 MICROgram(s)/kG/Min (8.972 mL/Hr) IV Continuous <Continuous>  propofol Infusion 5 MICROgram(s)/kG/Min (2.871 mL/Hr) IV Continuous <Continuous>  QUEtiapine 50 milliGRAM(s) Oral every 12 hours  sodium bicarbonate 975 milliGRAM(s) Oral every 8 hours    MEDICATIONS  (PRN):  acetaminophen    Suspension .. 650 milliGRAM(s) Oral every 6 hours PRN Temp greater or equal to 38C (100.4F)  dextrose 40% Gel 15 Gram(s) Oral once PRN Blood Glucose LESS THAN 70 milliGRAM(s)/deciliter  glucagon  Injectable 1 milliGRAM(s) IntraMuscular once PRN Glucose LESS THAN 70 milligrams/deciliter      PHYSICAL EXAM:  General: intubated and sedated, NAD  HEENT: NC/AT; PERRL, anicteric sclera; MMM; ETT and NGT in place  Neck: supple  Cardiovascular: +S1/S2, tachycardic  Respiratory: diffuse rhonchi  Gastrointestinal: soft but distended abdomen. notender. nephrostomy draining dark green liquid. cystostomy draining dark brown liquid. nonhealing scab on abdomen w some bleeding  Extremities: b/l trace LE edema to ankles  Vascular: 2+ radial, DP/PT pulses B/L  Neurological: sedated Patient is a 69y old  Male who presents with a chief complaint of acidemia (24 Jun 2019 10:33)      INTERVAL HPI/OVERNIGHT EVENTS: Pt seen and examined at bedside. Intubated and sedated. ROS not obtainable.      ICU Vital Signs Last 24 Hrs  T(C): 39.1 (24 Jun 2019 12:00), Max: 39.1 (24 Jun 2019 12:00)  T(F): 102.4 (24 Jun 2019 12:00), Max: 102.4 (24 Jun 2019 12:00)  HR: 120 (24 Jun 2019 12:00) (81 - 152)  BP: 104/58 (24 Jun 2019 12:00) (75/47 - 115/63)  BP(mean): 84 (24 Jun 2019 12:00) (56 - 84)  ABP: --  ABP(mean): --  RR: 28 (24 Jun 2019 12:00) (23 - 30)  SpO2: 97% (24 Jun 2019 12:00) (95% - 100%)    I&O's Summary    23 Jun 2019 07:01  -  24 Jun 2019 07:00  --------------------------------------------------------  IN: 3002.7 mL / OUT: 3500 mL / NET: -497.3 mL    24 Jun 2019 07:01  -  24 Jun 2019 12:23  --------------------------------------------------------  IN: 459 mL / OUT: 1030 mL / NET: -571 mL      Mode: AC/ CMV (Assist Control/ Continuous Mandatory Ventilation)  RR (machine): 18  TV (machine): 550  FiO2: 40  PEEP: 5  ITime: 1  MAP: 13  PIP: 23      LABS:                        9.7    35.89 )-----------( 461      ( 24 Jun 2019 04:43 )             31.0     06-24    138  |  104  |  57<H>  ----------------------------<  141<H>  4.6   |  19<L>  |  2.31<H>    Ca    8.7      24 Jun 2019 04:43  Phos  4.0     06-24  Mg     2.2     06-24    TPro  7.0  /  Alb  2.5<L>  /  TBili  0.3  /  DBili  <0.2  /  AST  40  /  ALT  17  /  AlkPhos  150<H>  06-24        CAPILLARY BLOOD GLUCOSE      POCT Blood Glucose.: 147 mg/dL (24 Jun 2019 06:02)  POCT Blood Glucose.: 175 mg/dL (23 Jun 2019 23:11)  POCT Blood Glucose.: 195 mg/dL (23 Jun 2019 17:21)        RADIOLOGY & ADDITIONAL TESTS:    Consultant(s) Notes Reviewed:  [x ] YES  [ ] NO    MEDICATIONS  (STANDING):  ALBUTerol/ipratropium for Nebulization 3 milliLiter(s) Nebulizer every 6 hours  amiodarone    Tablet 200 milliGRAM(s) Oral daily  chlorhexidine 0.12% Liquid 15 milliLiter(s) Oral Mucosa two times a day  chlorhexidine 4% Liquid 1 Application(s) Topical <User Schedule>  dextrose 5%. 1000 milliLiter(s) (50 mL/Hr) IV Continuous <Continuous>  dextrose 50% Injectable 12.5 Gram(s) IV Push once  dextrose 50% Injectable 25 Gram(s) IV Push once  dextrose 50% Injectable 25 Gram(s) IV Push once  furosemide Infusion 20 mG/Hr (10 mL/Hr) IV Continuous <Continuous>  heparin  Injectable 7500 Unit(s) SubCutaneous every 8 hours  insulin lispro (HumaLOG) corrective regimen sliding scale   SubCutaneous every 6 hours  meropenem  IVPB 1000 milliGRAM(s) IV Intermittent every 12 hours  metoprolol tartrate Injectable 2.5 milliGRAM(s) IV Push every 6 hours  norepinephrine Infusion 0.05 MICROgram(s)/kG/Min (8.972 mL/Hr) IV Continuous <Continuous>  propofol Infusion 5 MICROgram(s)/kG/Min (2.871 mL/Hr) IV Continuous <Continuous>  QUEtiapine 50 milliGRAM(s) Oral every 12 hours  sodium bicarbonate 975 milliGRAM(s) Oral every 8 hours    MEDICATIONS  (PRN):  acetaminophen    Suspension .. 650 milliGRAM(s) Oral every 6 hours PRN Temp greater or equal to 38C (100.4F)  dextrose 40% Gel 15 Gram(s) Oral once PRN Blood Glucose LESS THAN 70 milliGRAM(s)/deciliter  glucagon  Injectable 1 milliGRAM(s) IntraMuscular once PRN Glucose LESS THAN 70 milligrams/deciliter      PHYSICAL EXAM:  General: intubated and sedated, NAD  HEENT: NC/AT; PERRL, anicteric sclera; MMM; ETT and NGT in place  Neck: supple  Cardiovascular: +S1/S2, tachycardic  Respiratory: diffuse rhonchi  Gastrointestinal: soft but distended abdomen. notender. nephrostomy draining dark green liquid. cystostomy draining dark brown liquid. nonhealing scab on abdomen w some bleeding  Extremities: b/l trace LE edema to ankles  Vascular: 2+ radial, DP/PT pulses B/L  Neurological: sedated but moves extremities

## 2019-06-24 NOTE — PROGRESS NOTE ADULT - SUBJECTIVE AND OBJECTIVE BOX
Patient is a 69y Male seen and evaluated at bedside. Patient remains critically ill on ventilatory support on IV pressors at present. Serum creatinine remains stable @ 2.3 at present with non oliguria. Received Lasix 120mg yesterday and 80 mg IV today morning. Last 24 hours I/o with net negative 500cc fluid balance with 3.4L urine output. Patient remains febrile 101.5 with hypotension on IV pressors at present.     acetaminophen    Suspension .. 650 every 6 hours PRN  ALBUTerol/ipratropium for Nebulization 3 every 6 hours  amiodarone    Tablet 200 daily  chlorhexidine 0.12% Liquid 15 two times a day  chlorhexidine 4% Liquid 1 <User Schedule>  dextrose 40% Gel 15 once PRN  dextrose 5%. 1000 <Continuous>  dextrose 50% Injectable 12.5 once  dextrose 50% Injectable 25 once  dextrose 50% Injectable 25 once  glucagon  Injectable 1 once PRN  heparin  Injectable 7500 every 8 hours  insulin lispro (HumaLOG) corrective regimen sliding scale  every 6 hours  meropenem  IVPB 1000 every 12 hours  metoprolol tartrate Injectable 2.5 every 6 hours  norepinephrine Infusion 0.05 <Continuous>  propofol Infusion 5 <Continuous>  QUEtiapine 50 every 12 hours  sodium bicarbonate 975 every 8 hours      Allergies    No Known Allergies    Intolerances        T(C): , Max: 38.9 (06-24-19 @ 09:40)  T(F): , Max: 102.1 (06-24-19 @ 09:40)  HR: 100 (06-24-19 @ 10:00)  BP: 95/54 (06-24-19 @ 10:00)  BP(mean): 72 (06-24-19 @ 10:00)  RR: 29 (06-24-19 @ 10:00)  SpO2: 97% (06-24-19 @ 10:00)  Wt(kg): --    06-23 @ 07:01  -  06-24 @ 07:00  --------------------------------------------------------  IN: 3002.7 mL / OUT: 3500 mL / NET: -497.3 mL    06-24 @ 07:01  -  06-24 @ 10:34  --------------------------------------------------------  IN: 297.6 mL / OUT: 800 mL / NET: -502.4 mL          Review of Systems:  Limited. sedated and intubated on ventilatory support.    PHYSICAL EXAM:  GENERAL: Ill appearing, lying in bed, sedated and intubated on ventilatory support.  HEAD:  Atraumatic, Normocephalic,   EYES: Bilateral conjunctival and scleral pallor   Oral cavity: Oral mucosa dry and pale  NECK: Neck supple, No JVD, ET tube and NG tube in place  CHEST/LUNG: Bilateral decreased breath sounds, Bibasilar rales and crepitations, no wheezing  HEART: Regular rate and rhythm. KJ II/VI at LPSB, No gallop, no rub   ABDOMEN: Soft, nephrostomy draining dark green liquid. Ileostomy draining dark brown liquid. nonhealing scab on abdomen w some bleeding  EXTREMITIES: Bilateral leg and thigh edema  Neurology: AAOx0, sedated and intubated on ventilatory support  SKIN: No rashes or lesions      ACCESS:     LABS:                        9.7    35.89 )-----------( 461      ( 24 Jun 2019 04:43 )             31.0     06-24    138  |  104  |  57<H>  ----------------------------<  141<H>  4.6   |  19<L>  |  2.31<H>    Ca    8.7      24 Jun 2019 04:43  Phos  4.0     06-24  Mg     2.2     06-24    TPro  7.0  /  Alb  2.5<L>  /  TBili  0.3  /  DBili  <0.2  /  AST  40  /  ALT  17  /  AlkPhos  150<H>  06-24                RADIOLOGY & ADDITIONAL STUDIES:  < from: Xray Chest 1 View- PORTABLE-Routine (06.23.19 @ 06:31) >    EXAM:  XR CHEST PORTABLE ROUTINE 1V                          PROCEDURE DATE:  06/23/2019          INTERPRETATION:  Clinical History: Shortness of breath    Portable examination of the chest demonstrates no interval change   position remaining support devices in comparison to prior examination of   the chest 6/21/2019. Congestion and/or infiltrates. Right effusion.    Impression: Congestion and/or infiltrates. Right effusion            Thank you for the opportunity to participate in the care of this patient.        ROGER FRANKEL M.D., ATTENDING RADIOLOGIST  This document has been electronically signed. Jun 23 2019  8:43AM                  < end of copied text >

## 2019-06-24 NOTE — PROGRESS NOTE ADULT - ATTENDING COMMENTS
events noted and reviewed  non-oliguric HUGO most likely ATN  creatinine improving-   clinically, still volume expanded-  maintain net negative balance with IV diuretics, as above

## 2019-06-24 NOTE — PROGRESS NOTE ADULT - ASSESSMENT
70 y/o M PMH HTN, chronic back pain, s/p radical cystectomy p/w SOB, found to have severe metabolic and respiratory acidemia, urosepsis with likely left pyelonephritis, with likely acute ATN on CKD, severe dehydration, likely metastatic cancer, admitted to MICU for emergent HD and further workup.    # Non-oliguric HUGO on unknown prior baseline renal function (admission S cr 4.7) with slowly improving renal function with stable S cr of 2.3 likely ischemic ATN from septic shock and post obstructive with hydronephrosis  - S cr stable @ 2.3 with non oliguria  - Last 24 hours I/o with net negative 500cc fluid balance with 3.2L urine output.   - Received intermittent Lasix over past 24 hours to goal fluid balance net negative.  - IV Lasix as needed to keep goal fluid balance net negative as not to drop MAP to avoid renal hypoperfusion as patient still remains febrile and septic.  - Volume status still on wet side with stable electrolytes  - Maintain MAP>65-70 mm Hg all time for better renal perfusion  - Adjust antibiotics as per renal dose clearance with eGFR 20ml/min for now.  - Continue Ventilatory support and IV pressors per primary ICU team.  - Monitor BMP every 12 hrly  -No urgent need of RRT/HD at present    # Acute respiratory failure with lung mass:  Plan pe primary  team  goals of care per primary ICu team/palliative care team.  Overall prognosis seems to be poor.

## 2019-06-24 NOTE — CHART NOTE - NSCHARTNOTEFT_GEN_A_CORE
Admitting Diagnosis:   Patient is a 69y old  Male who presents with a chief complaint of acidemia (24 Jun 2019 12:22)      PAST MEDICAL & SURGICAL HISTORY:  Chronic back pain  HTN (hypertension)  H/O total cystectomy      Current Nutrition Order:  Nepro @ 37mL/hr x 24hrs plus 1 ProStat via NGT. (888mL TV, 1698 kcal, 87g pro, 646mL free H2O, 94% RDI)  +454kcal from propofol     PO Intake: Good (%) [   ]  Fair (50-75%) [   ] Poor (<25%) [   ]- N/A NPO w/EN    GI Issues: Unable to assess at this time 2/2 vent; Rectal tube in place with low output  No TF residuals recorded overnight     Pain: Unable to assess at this time 2/2 vent; sedated    Skin Integrity: Jean 13  midline surgical wound  urostomy   IAD    Labs:   06-24    138  |  104  |  57<H>  ----------------------------<  141<H>  4.6   |  19<L>  |  2.31<H>    Ca    8.7      24 Jun 2019 04:43  Phos  4.0     06-24  Mg     2.2     06-24    TPro  7.0  /  Alb  2.5<L>  /  TBili  0.3  /  DBili  <0.2  /  AST  40  /  ALT  17  /  AlkPhos  150<H>  06-24    CAPILLARY BLOOD GLUCOSE      POCT Blood Glucose.: 157 mg/dL (24 Jun 2019 12:56)  POCT Blood Glucose.: 147 mg/dL (24 Jun 2019 06:02)  POCT Blood Glucose.: 175 mg/dL (23 Jun 2019 23:11)  POCT Blood Glucose.: 195 mg/dL (23 Jun 2019 17:21)      Medications:  MEDICATIONS  (STANDING):  ALBUTerol/ipratropium for Nebulization 3 milliLiter(s) Nebulizer every 6 hours  amiodarone    Tablet 200 milliGRAM(s) Oral daily  chlorhexidine 0.12% Liquid 15 milliLiter(s) Oral Mucosa two times a day  chlorhexidine 4% Liquid 1 Application(s) Topical <User Schedule>  dextrose 5%. 1000 milliLiter(s) (50 mL/Hr) IV Continuous <Continuous>  dextrose 50% Injectable 12.5 Gram(s) IV Push once  dextrose 50% Injectable 25 Gram(s) IV Push once  dextrose 50% Injectable 25 Gram(s) IV Push once  furosemide Infusion 20 mG/Hr (10 mL/Hr) IV Continuous <Continuous>  heparin  Injectable 7500 Unit(s) SubCutaneous every 8 hours  insulin lispro (HumaLOG) corrective regimen sliding scale   SubCutaneous every 6 hours  meropenem  IVPB 1000 milliGRAM(s) IV Intermittent every 12 hours  metoprolol tartrate Injectable 2.5 milliGRAM(s) IV Push every 6 hours  norepinephrine Infusion 0.05 MICROgram(s)/kG/Min (8.972 mL/Hr) IV Continuous <Continuous>  propofol Infusion 5 MICROgram(s)/kG/Min (2.871 mL/Hr) IV Continuous <Continuous>  QUEtiapine 50 milliGRAM(s) Oral every 12 hours  sodium bicarbonate 975 milliGRAM(s) Oral every 8 hours    MEDICATIONS  (PRN):  acetaminophen    Suspension .. 650 milliGRAM(s) Oral every 6 hours PRN Temp greater or equal to 38C (100.4F)  dextrose 40% Gel 15 Gram(s) Oral once PRN Blood Glucose LESS THAN 70 milliGRAM(s)/deciliter  glucagon  Injectable 1 milliGRAM(s) IntraMuscular once PRN Glucose LESS THAN 70 milligrams/deciliter      Weight: 95.7kg   Daily     Daily     Weight Change: No new weights recorded since admit     Nutrition Focused Physical Exam: Completed [   ]  Not Pertinent [ X  ]    Estimated energy needs: Ideal body weight (58.9kg) used for calculations as pt >120% of IBW. Needs estimated for maintenance in older adults; adjusted for vent.  Calories: 25-30 kcal/kg = 4233-2485 kcal/day  Protein: 1.4-1.6 g/kg = 82-94g protein/day  Fluids: 30-35 mL/kg = 2519-8672 mL/day OR per team discretion     Subjective: 70 yo/male with PMHx HTN, chronic back pain, s/p radical cystectomy, nephrostomy in remaining kidney, admitted with SOB and found to have severe acidemia. CT chest c/f lung cancer w/mets. S/p bronch on 6/17, cytology negative. IR did not perform biopsy as too high risk. Pt seen in room and discussed during MICU rounds. Pt intubated on VC/AC mode, was tachypneic on CPAP trials. Sedated on propofol @ 17.2mL/hr (454kcal/day from lipids). MAP 82, requiring levophed for BP support, requirements fluctuating. EN running at goal w/no residuals overnight, rectal tube w/low output. Pt also w/midline abdominal wound w/fecal-like drainage. Palliative to hold GOC discussion today w/HCP- will continue to aligned nutrition with GOC at all times.     Previous Nutrition Diagnosis:  Increased protein-calorie needs RT increased demand for protein-calorie intake AEB vent     Active [ X  ]  Resolved [   ]    If resolved, new PES:     Goal: Pt will meet % of EER per day via tolerated route     Recommendations:  1. Cont. with current TF order. Monitor for s/s intolerance; maintain aspiration precautions at all times   *please continue to follow VBF protocol  2. Monitor lytes and replete prn. POC BG Q6hrs   3. Weekly weights   4. Pain and bowel regimens per team   5. Keep nutrition aligned with GOC at all times     Education: N/A- vent     Risk Level: High [ X  ] Moderate [   ] Low [   ]

## 2019-06-24 NOTE — PROGRESS NOTE ADULT - ASSESSMENT
68 y/o M PMH smoker (50 years and current), sleep apnea w/o cpap, short-term mem loss, HTN, DM2, chronic back pain s/p multiple back surgeries w/ hardware on methadone, anxiety, SBO s/p sx c/b wound that never healed, prostate cancer 1999 s/p radiation c/b radical cystectomy and nephrostomy w/ ileal conduit (in remission), p/w SOB, found to have severe acidemia, sepsis 2/2 pyelonephritis vs PNA, new onset suspected lung cancer w/ mets, admitted to MICU.     Neurology  #TME  agitation/ anxiety hx/ short term mem loss hx, likely 2/2 uremia vs sepsis vs baseline per partner when has short term mem loss and gets confused and agitated,  -became agitated and tachycardic off sedation. re-sedated w propofol at this time.   -c/w Seroquel 50 bid, monitor qtc    Cardiac  #mild aortic aneurysm  dilation of the aortic root and ascending aorta on ct, no known hx, no AR on echo  -control sBP <150     #hypotension  likely 2/2 sedation and sepsis, lactate improved to 2.0, cannot assess true septic levo requirements until off sedation  -c/w levo gtt    #htn  -currently on pressors - holding home anti-hypertensive medications    #fluid overload  pt grossly overloaded on CXR. s/p  lasix 120IV and 10mg metolazone w/ imrpvoement in cxr and UO  - monitor I/Os  - lasix gtt     Pulmonary  #lung cancer w/ mets  CT chest/ abd w/o contrast due to cr 4.5 showing 7cm RLL mass concerning for neoplasm w/ suspected hepatic and adrenal mets and abd LAD on ct, no known hx of lung cancer per partner, never been treated as pt was unaware, chronic smoker but never screened with CT per partner, dopplers neg for dvt, bronchoscopy 6/17 showing RML mucous plugging, sent for sputum cx and cytology, GOC per partner- living will no definitive decisions but leaves all GOC decisions to partner, pal care consulted, pt DNR, -suspect PNA is post-obstructive pna 2/2 mass. bronchoscopy negative for malignant cells- HCP discussed with IR and agreed risk too high for biopsy, poor pgx  -pal care consulted, meeting today for GOC  -oncology consulted    #smoker   50 years and current, no recent ct scans  -nicotine patch prn when pt not sedated    #sleep apnea  offered cpap at home but did not use  -currently intubated  -cpap at night when off mech vent    #COPD  no dx, no inhalers, emphysema on CT scan of chest  -duonebs q6 standing     #post obstructive PNA 2/2 suspected lung cancer  s/p 10 days of tito, continues to spike fevers (no increase in fever curve)  -cannot obtain source control given comorbidities not a candidate for surgery  -consider repeat ct chest if in line with goc    Renal  #HUGO on CKD  Cr 4.5 on admission, baseline cr 1.6 2/19 per PMD, cr improved and pt continues to make urine and respond to lasix, renal following  -lasic gtt as above  -f/u renal recs  -monitor bmp  -possible HD in near future  -monitor UO   -renally dose medications    Metabolic  #metabolic acidosis   bicarb low, no AG, no diarrhea, likely 2/2 hyperchloremia from NS  -monitor bmp  -renal consult as above  -c/w sodium bicarb po 975 tid, consider increasing  -keep meds in d5 not ns    ID  # sepsis 2/2 post-obstructive PNA  on admission wbc 30, w/ tachypnea to 22 meets 2/4 sirs criteria afebrile, lactate 2.6. cxr clear, ct chest RLL PNA and under mass. ct scan showing perinephritic stranding and mod hydronephrosis concerning for UTI/ nephritis UA+, but urince cx neg. pt w/ distended gallbladder w/ stones concerning for cholecystitis but  HIDA scan neg, blood and sputum and urine cultures no growth, wound care for skin wound over abdomen (doesnt look infected, from SBO s/p sx c/b wound that never healed, ) penile dc cx grows e faecalis sensitive to amp/vanc. pt with no culture data other than e fecalis in penile cx, covered by meropenam (alos covering for empiric PNA and pyelo), lactate nomrlaized to 2, WBC still elevated to 30 and pt still febrile, likely has post obstructive PNA without source control so continues to spike and have wbc  -as above under pulm    Endocrine  #DM2  a1c 7.4, on home metformin, fsgs controlled  -ISS  -add insulin regimen if needed    MSK  #chronic back pain  chronic back pain s/p multiple back surgeries w/ hardware on methadone, utox  positive for methadone only which per partner pt gest from pain specialist Dr Kwan, takes  5mg 2-4 pills qd   -methadone 5-20 mg qd, higher end if needed for pain control when off sedation      #cystectomy with nephrostomy  ct showing mod hydro but draining dark brown urine, R kidney atrophy, prostate cancer 1999 s/p radiation c/b radical cystectomy and nephrostomy w/ ileal conduit (in remission)  -urology consulted, f.u recs  -monitor UO from nephrostomy bag    GI  #npo with tube feeds  -tube feeds through ngt    #constipation  hx of sbo, no sbo on ct abd, belly benign on exam, now s/p 2 BMs am 6/19 and 1 6/20  -bowel regimen  -if residuals in feeds >500, add reglan    Heme  anemia, normocytic, likely 2/2 hugo on ckd and acd, cbc stable no s./s of bleed  -monitor CBC    OTHER  F: none  E: replete PRN but cautious in Hugo in CKD  N: npo with tube feeds,  DVT ppx: HSQ 5000 q8, scds  other ppx: none required as pt on feeds  DNR, not DNI for now  dispo: MICU 70 y/o M PMH smoker (50 years and current), sleep apnea w/o cpap, short-term mem loss, HTN, DM2, chronic back pain s/p multiple back surgeries w/ hardware on methadone, anxiety, SBO s/p sx c/b wound that never healed, prostate cancer 1999 s/p radiation c/b radical cystectomy and nephrostomy w/ ileal conduit (in remission), p/w SOB, found to have severe acidemia, sepsis 2/2 pyelonephritis vs PNA, new onset suspected lung cancer w/ mets, admitted to MICU.     Neurology  #TME  agitation/ anxiety hx/ short term mem loss hx, likely 2/2 uremia vs sepsis vs baseline per partner when has short term mem loss and gets confused and agitated,  -became agitated and tachycardic off sedation. re-sedated w propofol at this time.   -c/w Seroquel 50 bid, monitor qtc    Cardiac  #mild aortic aneurysm  dilation of the aortic root and ascending aorta on ct, no known hx, no AR on echo  -control sBP <150     #hypotension  likely 2/2 sedation and sepsis, lactate improved to 2.0, cannot assess true septic levo requirements until off sedation  -c/w levo gtt    #htn  -currently on pressors - holding home anti-hypertensive medications    #fluid overload  pt grossly overloaded on CXR. s/p  lasix 120IV and 10mg metolazone w/ imrpvoement in cxr and UO  - monitor I/Os  - lasix gtt     Pulmonary  #lung cancer w/ mets  CT chest/ abd w/o contrast due to cr 4.5 showing 7cm RLL mass concerning for neoplasm w/ suspected hepatic and adrenal mets and abd LAD on ct, no known hx of lung cancer per partner, never been treated as pt was unaware, chronic smoker but never screened with CT per partner, dopplers neg for dvt, bronchoscopy 6/17 showing RML mucous plugging, sent for sputum cx and cytology, GOC per partner- living will no definitive decisions but leaves all GOC decisions to partner, pal care consulted, pt DNR, -suspect PNA is post-obstructive pna 2/2 mass. bronchoscopy negative for malignant cells- HCP discussed with IR and agreed risk too high for biopsy, poor pgx  -pal care consulted, meeting today for GOC  -oncology consulted    #smoker   50 years and current, no recent ct scans  -nicotine patch prn when pt not sedated    #sleep apnea  offered cpap at home but did not use  -currently intubated  -cpap at night when off mech vent    #COPD  no dx, no inhalers, emphysema on CT scan of chest  -duonebs q6 standing     #post obstructive PNA 2/2 suspected lung cancer  s/p 10 days of tito, continues to spike fevers (no increase in fever curve)  -cannot obtain source control given comorbidities not a candidate for surgery  -consider repeat ct chest if in line with goc    Renal  #HUGO on CKD  Cr 4.5 on admission, baseline cr 1.6 2/19 per PMD, cr improved and pt continues to make urine and respond to lasix, renal following  -lasix gtt as above  -f/u renal recs  -monitor bmp  -possible HD in near future  -monitor UO   -renally dose medications    Metabolic  #metabolic acidosis   bicarb low, no AG, no diarrhea, likely 2/2 hyperchloremia from NS  -monitor bmp  -renal consult as above  -c/w sodium bicarb po 975 tid, consider increasing  -keep meds in d5 not ns    ID  # sepsis 2/2 post-obstructive PNA  on admission wbc 30, w/ tachypnea to 22 meets 2/4 sirs criteria afebrile, lactate 2.6. cxr clear, ct chest RLL PNA and under mass. ct scan showing perinephritic stranding and mod hydronephrosis concerning for UTI/ nephritis UA+, but urince cx neg. pt w/ distended gallbladder w/ stones concerning for cholecystitis but  HIDA scan neg, blood and sputum and urine cultures no growth, wound care for skin wound over abdomen (doesnt look infected, from SBO s/p sx c/b wound that never healed, ) penile dc cx grows e faecalis sensitive to amp/vanc. pt with no culture data other than e fecalis in penile cx, covered by meropenam (alos covering for empiric PNA and pyelo), lactate nomrlaized to 2, WBC still elevated to 30 and pt still febrile, likely has post obstructive PNA without source control so continues to spike and have wbc  -as above under pulm    Endocrine  #DM2  a1c 7.4, on home metformin, fsgs controlled  -ISS  -add insulin regimen if needed    MSK  #chronic back pain  chronic back pain s/p multiple back surgeries w/ hardware on methadone, utox  positive for methadone only which per partner pt gest from pain specialist Dr Kwan, takes  5mg 2-4 pills qd   -methadone 5-20 mg qd, higher end if needed for pain control when off sedation      #cystectomy with nephrostomy  ct showing mod hydro but draining dark brown urine, R kidney atrophy, prostate cancer 1999 s/p radiation c/b radical cystectomy and nephrostomy w/ ileal conduit (in remission)  -urology consulted, f.u recs  -monitor UO from nephrostomy bag    GI  #npo with tube feeds  -tube feeds through ngt    #constipation  hx of sbo, no sbo on ct abd, belly benign on exam, now s/p 2 BMs am 6/19 and 1 6/20  -bowel regimen  -if residuals in feeds >500, add reglan    Heme  anemia, normocytic, likely 2/2 hugo on ckd and acd, cbc stable no s./s of bleed  -monitor CBC    OTHER  F: none  E: replete PRN but cautious in Hugo in CKD  N: npo with tube feeds,  DVT ppx: HSQ 5000 q8, scds  other ppx: none required as pt on feeds  DNR, not DNI for now  dispo: MICU

## 2019-06-24 NOTE — PROGRESS NOTE ADULT - ASSESSMENT
68 y/o morbidly obese M with h/o remote prostate ca s/p RT, bowel obstruction, right cystectomy with ileal conduit, HTN, spine surgery, chronic pain syndrome on methadone, CASEY, and progressive cognitive deficits per partner, p/w SOB, found to be in septic shock from pyleonephritis/pna and abnormal radiograph suggesting metastatic lung cancer, with fevers, seen for goals of care

## 2019-06-25 NOTE — PROGRESS NOTE ADULT - SUBJECTIVE AND OBJECTIVE BOX
JANIE ZABALA   MRN-[id number]         CC: Patient is a 69y old  Male who presents with a chief complaint of acidemia (2019 12:27)    HPI:  68 y/o M PMH HTN, smoker, chronic back pain, s/p radical cystectomy w/ one remaining kidney w/ nephrostomy /w SOB x 3 days. Pt denies fever, cough, CP. Pt in pain in R back. No belly pain. Could not provide hx of when had procedures or smoking hx.    In the ED, pt afebrile, HR 72, satting 100% on 3L NC, RR 22-->18, BP 90/60-->120s. Labs significant for WBC 30, K 5.7, bicarb 11, AG 20, VBG pH 7.12-->7.08, CO2 40, lactate 2.6, BUN ~70, Cr ~4, unknown baseline. Carboxyhemoglobin slightly elevated at 2.6. CXR clear. CT chest abd concerning for metastatic lung cancer, and possible cholecystitis and nephritis w/ hydro. S/p vanc, cefepime, ketamine, bicarb drip 150/hr, insulin/ d50, LR 1 L bolus. Transferred to [facility] MICU for emergent HD and further care. On arrival, renal and urology consulted. Pt putting out urine into nephrostomy tube and abg improved so no urgent HD, but renal will monitor and start HD if needed. Pt started on vanc/ zosyn for pyelonephritis and broad coverage while r/o pna and cholecystitis, or infected surgical wound. (2019 15:29)    SUBJECTIVE: pressor requirements are up.  No visitors noted at bedside today per primary team  ROS:  UNABLE TO OBTAIN  due to:    DYSPNEA (Y/N):	  N/V (Y/N):	  SECRETIONS (Y/N):	  AGITATION (Y/N):  PAIN(Y/N):        -Provocation/Palliation:     -Quality/Quantity:     -Radiating:     -Severity:     -Timing/Frequency:     -Impact on ADLs:      PHYSICAL EXAM:  T(C): 37.6 (19 @ 12:00), Max: 39.2 (19 @ 14:12)  T(F): 99.6 (19 @ 12:00), Max: 102.6 (19 @ 14:12)  HR: 98 (19 @ 12:43) (80 - 104)  BP: 88/54 (19 @ 12:00) (77/44 - 127/60)  RR: 21 (19 @ 12:00) (20 - 33)  SpO2: 97% (19 @ 12:43) (97% - 100%)    GENERAL: Critically ill gentleman appearing sicker  HEENT: no spont. eye opening     	        CVS: SR on ECG           	  RESP: ETT to CMV on vent       	  GI: NGT           	  : ileal conduit          	  MUSC: no spont. mov't to extremities noted      	  NEURO: sedated on propofol   	      ALLERGIES:  No Known Allergies    OPIATE NAÏVE (Y/N): y  -iStop reviewed (Y/N): Y    MEDICATIONS: reviewed  MEDICATIONS  (STANDING):  ALBUTerol/ipratropium for Nebulization 3 milliLiter(s) Nebulizer every 6 hours  amiodarone    Tablet 200 milliGRAM(s) Oral daily  chlorhexidine 0.12% Liquid 15 milliLiter(s) Oral Mucosa two times a day  chlorhexidine 4% Liquid 1 Application(s) Topical <User Schedule>  dextrose 5%. 1000 milliLiter(s) (50 mL/Hr) IV Continuous <Continuous>  dextrose 50% Injectable 12.5 Gram(s) IV Push once  dextrose 50% Injectable 25 Gram(s) IV Push once  dextrose 50% Injectable 25 Gram(s) IV Push once  heparin  Injectable 7500 Unit(s) SubCutaneous every 8 hours  insulin lispro (HumaLOG) corrective regimen sliding scale   SubCutaneous every 6 hours  metoprolol tartrate Injectable 2.5 milliGRAM(s) IV Push every 6 hours  norepinephrine Infusion 0.05 MICROgram(s)/kG/Min (8.972 mL/Hr) IV Continuous <Continuous>  pantoprazole    Tablet 40 milliGRAM(s) Oral before breakfast  propofol Infusion 5 MICROgram(s)/kG/Min (2.871 mL/Hr) IV Continuous <Continuous>  QUEtiapine 50 milliGRAM(s) Oral every 12 hours  sodium bicarbonate 1300 milliGRAM(s) Oral every 8 hours    MEDICATIONS  (PRN):  acetaminophen    Suspension .. 650 milliGRAM(s) Oral every 6 hours PRN Temp greater or equal to 38C (100.4F)  dextrose 40% Gel 15 Gram(s) Oral once PRN Blood Glucose LESS THAN 70 milliGRAM(s)/deciliter  glucagon  Injectable 1 milliGRAM(s) IntraMuscular once PRN Glucose LESS THAN 70 milligrams/deciliter  ondansetron Injectable 4 milliGRAM(s) IV Push every 6 hours PRN Vomiting    LABS: reviewed                        10.2   42.03 )-----------( 560      ( 2019 07:04 )             33.0         142  |  104  |  58<H>  ----------------------------<  134<H>  4.1   |  21<L>  |  2.23<H>    Ca    8.9      2019 07:03  Phos  5.3       Mg     1.9         TPro  7.5  /  Alb  2.5<L>  /  TBili  0.3  /  DBili  x   /  AST  33  /  ALT  14  /  AlkPhos  144<H>      LIVER FUNCTIONS - ( 2019 07:03 )  Alb: 2.5 g/dL / Pro: 7.5 g/dL / ALK PHOS: 144 U/L / ALT: 14 U/L / AST: 33 U/L / GGT: x           PT/INR - ( 2019 07:04 )   PT: 13.5 sec;   INR: 1.19     PTT - ( 2019 07:04 )  PTT:31.2 sec    IMAGING: reviewed    ADVANCE DIRECTIVES: DNR  Living Will   Decision maker: pt without capacity to make med decisions at this time, primary HCP is partner Vinod Bo  Legal surrogate: alt HCP on pt's Living Will is  sister Vaughn Harper, second alt HCP is friend Candelaria Antonio [phone] (c), [phone] (H)    PSYCHOSOCIAL-SPIRITUAL ASSESSMENT:  Reviewed	    GOALS OF CARE DISCUSSION:  Palliative care info/counseling provided	       See previous Palliative Medicine Note  Documentation of GOC: cont. current management until HCP speaks with pt's cousins    AGENCY CHOICE DISCUSSED:   none          REFERRALS:	   Palliative Med   Unit SW/Case Mgmt  -HCP declined  Nutrition    [ ]CRITICAL CARE TIME PROVIDED TO UNSTABLE PT W/ ORGAN FAILURE            [x]> 50% OF THE TIME SPENT IN COUNSELING AND COORDINATING CARE     [ ]PROLONGED SERVICE       FACE TO FACE: [x]PT     [ ]PT & FAMILY    Start:               End:  	       Minutes:

## 2019-06-25 NOTE — PROGRESS NOTE ADULT - ATTENDING COMMENTS
HUGO- creatinine stable 2.2 down from peak 4+  continue to generate negative balance with lasix drip

## 2019-06-25 NOTE — PROGRESS NOTE ADULT - ASSESSMENT
70 y/o M PMH HTN, chronic back pain, s/p radical cystectomy p/w SOB, found to have severe metabolic and respiratory acidemia, urosepsis with likely left pyelonephritis, with likely acute ATN on CKD, severe dehydration, likely metastatic cancer, admitted to MICU for emergent HD and further workup.    # Non-oliguric HUGO on unknown prior baseline renal function (admission S cr 4.7) with stable renal function with S cr 2.2 likely ischemic ATN from septic shock and post obstructive with hydronephrosis  - S cr stable @ 2.2 with non oliguria  - Last 24 hours I/o with net negative 1.5L fluid balance with 3.2L urine output.   - IV Lasix drip to keep goal fluid balance to be net negative in next 24hours  - Volume status still on wet side with stable electrolytes and renal function  - Maintain MAP>65-70 mm Hg all time for better renal perfusion  - Adjust antibiotics as per renal dose clearance with eGFR 20ml/min for now.  - Continue Ventilatory support and IV pressors per primary ICU team.  - Monitor BMP every 12 hrly  -No urgent need of RRT/HD at present    # Acute respiratory failure with lung mass:  Plan pe primary  team  goals of care per primary ICu team/palliative care team.

## 2019-06-25 NOTE — PROGRESS NOTE ADULT - SUBJECTIVE AND OBJECTIVE BOX
Patient is a 69y old  Male who presents with a chief complaint of acidemia (25 Jun 2019 10:31)      INTERVAL HPI/OVERNIGHT EVENTS: Pt seen and examined at bedside.       ICU Vital Signs Last 24 Hrs  T(C): 37.6 (25 Jun 2019 12:00), Max: 39.4 (24 Jun 2019 13:00)  T(F): 99.6 (25 Jun 2019 12:00), Max: 102.9 (24 Jun 2019 13:00)  HR: 94 (25 Jun 2019 12:00) (80 - 124)  BP: 88/54 (25 Jun 2019 12:00) (76/42 - 127/60)  BP(mean): 68 (25 Jun 2019 12:00) (53 - 103)  ABP: --  ABP(mean): --  RR: 21 (25 Jun 2019 12:00) (20 - 33)  SpO2: 99% (25 Jun 2019 12:00) (97% - 100%)    I&O's Summary    24 Jun 2019 07:01  -  25 Jun 2019 07:00  --------------------------------------------------------  IN: 1983 mL / OUT: 3590 mL / NET: -1607 mL    25 Jun 2019 07:01  -  25 Jun 2019 12:27  --------------------------------------------------------  IN: 285.5 mL / OUT: 320 mL / NET: -34.5 mL      Mode: AC/ CMV (Assist Control/ Continuous Mandatory Ventilation)  RR (machine): 18  TV (machine): 550  FiO2: 40  PEEP: 5  ITime: 1  MAP: 12  PIP: 23      LABS:                        10.2   42.03 )-----------( 560      ( 25 Jun 2019 07:04 )             33.0     06-25    142  |  104  |  58<H>  ----------------------------<  134<H>  4.1   |  21<L>  |  2.23<H>    Ca    8.9      25 Jun 2019 07:03  Phos  5.3     06-25  Mg     1.9     06-25    TPro  7.5  /  Alb  2.5<L>  /  TBili  0.3  /  DBili  x   /  AST  33  /  ALT  14  /  AlkPhos  144<H>  06-25    PT/INR - ( 25 Jun 2019 07:04 )   PT: 13.5 sec;   INR: 1.19          PTT - ( 25 Jun 2019 07:04 )  PTT:31.2 sec    CAPILLARY BLOOD GLUCOSE      POCT Blood Glucose.: 149 mg/dL (25 Jun 2019 11:28)  POCT Blood Glucose.: 118 mg/dL (25 Jun 2019 06:23)  POCT Blood Glucose.: 134 mg/dL (24 Jun 2019 23:53)  POCT Blood Glucose.: 193 mg/dL (24 Jun 2019 19:08)  POCT Blood Glucose.: 157 mg/dL (24 Jun 2019 12:56)        RADIOLOGY & ADDITIONAL TESTS:    Consultant(s) Notes Reviewed:  [x ] YES  [ ] NO    MEDICATIONS  (STANDING):  ALBUTerol/ipratropium for Nebulization 3 milliLiter(s) Nebulizer every 6 hours  amiodarone    Tablet 200 milliGRAM(s) Oral daily  chlorhexidine 0.12% Liquid 15 milliLiter(s) Oral Mucosa two times a day  chlorhexidine 4% Liquid 1 Application(s) Topical <User Schedule>  dextrose 5%. 1000 milliLiter(s) (50 mL/Hr) IV Continuous <Continuous>  dextrose 50% Injectable 12.5 Gram(s) IV Push once  dextrose 50% Injectable 25 Gram(s) IV Push once  dextrose 50% Injectable 25 Gram(s) IV Push once  heparin  Injectable 7500 Unit(s) SubCutaneous every 8 hours  insulin lispro (HumaLOG) corrective regimen sliding scale   SubCutaneous every 6 hours  metoprolol tartrate Injectable 2.5 milliGRAM(s) IV Push every 6 hours  norepinephrine Infusion 0.05 MICROgram(s)/kG/Min (8.972 mL/Hr) IV Continuous <Continuous>  propofol Infusion 5 MICROgram(s)/kG/Min (2.871 mL/Hr) IV Continuous <Continuous>  QUEtiapine 50 milliGRAM(s) Oral every 12 hours  sodium bicarbonate 1300 milliGRAM(s) Oral every 8 hours    MEDICATIONS  (PRN):  acetaminophen    Suspension .. 650 milliGRAM(s) Oral every 6 hours PRN Temp greater or equal to 38C (100.4F)  dextrose 40% Gel 15 Gram(s) Oral once PRN Blood Glucose LESS THAN 70 milliGRAM(s)/deciliter  glucagon  Injectable 1 milliGRAM(s) IntraMuscular once PRN Glucose LESS THAN 70 milligrams/deciliter  ondansetron Injectable 4 milliGRAM(s) IV Push every 6 hours PRN Vomiting      PHYSICAL EXAM:  General: intubated and sedated, NAD  HEENT: NC/AT; PERRL, anicteric sclera; MMM; ETT and NGT/sump in place drained 150 cc fluid o/n  Neck: supple  Cardiovascular: +S1/S2, RRR  Respiratory: diffuse rhonchi  Gastrointestinal: soft but distended abdomen. nontender. nephrostomy draining dark green liquid. cystostomy draining dark brown liquid. nonhealing scab on abdomen w some bleeding  Extremities: no edema  Vascular: 2+ radial, DP/PT pulses B/L  Neurological: sedated   skin: no brusies or rashes Patient is a 69y old  Male who presents with a chief complaint of acidemia (25 Jun 2019 10:31)      INTERVAL HPI/OVERNIGHT EVENTS: Pt seen and examined at bedside. ROS not obtainable      ICU Vital Signs Last 24 Hrs  T(C): 37.6 (25 Jun 2019 12:00), Max: 39.4 (24 Jun 2019 13:00)  T(F): 99.6 (25 Jun 2019 12:00), Max: 102.9 (24 Jun 2019 13:00)  HR: 94 (25 Jun 2019 12:00) (80 - 124)  BP: 88/54 (25 Jun 2019 12:00) (76/42 - 127/60)  BP(mean): 68 (25 Jun 2019 12:00) (53 - 103)  ABP: --  ABP(mean): --  RR: 21 (25 Jun 2019 12:00) (20 - 33)  SpO2: 99% (25 Jun 2019 12:00) (97% - 100%)    I&O's Summary    24 Jun 2019 07:01  -  25 Jun 2019 07:00  --------------------------------------------------------  IN: 1983 mL / OUT: 3590 mL / NET: -1607 mL    25 Jun 2019 07:01  -  25 Jun 2019 12:27  --------------------------------------------------------  IN: 285.5 mL / OUT: 320 mL / NET: -34.5 mL      Mode: AC/ CMV (Assist Control/ Continuous Mandatory Ventilation)  RR (machine): 18  TV (machine): 550  FiO2: 40  PEEP: 5  ITime: 1  MAP: 12  PIP: 23      LABS:                        10.2   42.03 )-----------( 560      ( 25 Jun 2019 07:04 )             33.0     06-25    142  |  104  |  58<H>  ----------------------------<  134<H>  4.1   |  21<L>  |  2.23<H>    Ca    8.9      25 Jun 2019 07:03  Phos  5.3     06-25  Mg     1.9     06-25    TPro  7.5  /  Alb  2.5<L>  /  TBili  0.3  /  DBili  x   /  AST  33  /  ALT  14  /  AlkPhos  144<H>  06-25    PT/INR - ( 25 Jun 2019 07:04 )   PT: 13.5 sec;   INR: 1.19          PTT - ( 25 Jun 2019 07:04 )  PTT:31.2 sec    CAPILLARY BLOOD GLUCOSE      POCT Blood Glucose.: 149 mg/dL (25 Jun 2019 11:28)  POCT Blood Glucose.: 118 mg/dL (25 Jun 2019 06:23)  POCT Blood Glucose.: 134 mg/dL (24 Jun 2019 23:53)  POCT Blood Glucose.: 193 mg/dL (24 Jun 2019 19:08)  POCT Blood Glucose.: 157 mg/dL (24 Jun 2019 12:56)        RADIOLOGY & ADDITIONAL TESTS:    Consultant(s) Notes Reviewed:  [x ] YES  [ ] NO    MEDICATIONS  (STANDING):  ALBUTerol/ipratropium for Nebulization 3 milliLiter(s) Nebulizer every 6 hours  amiodarone    Tablet 200 milliGRAM(s) Oral daily  chlorhexidine 0.12% Liquid 15 milliLiter(s) Oral Mucosa two times a day  chlorhexidine 4% Liquid 1 Application(s) Topical <User Schedule>  dextrose 5%. 1000 milliLiter(s) (50 mL/Hr) IV Continuous <Continuous>  dextrose 50% Injectable 12.5 Gram(s) IV Push once  dextrose 50% Injectable 25 Gram(s) IV Push once  dextrose 50% Injectable 25 Gram(s) IV Push once  heparin  Injectable 7500 Unit(s) SubCutaneous every 8 hours  insulin lispro (HumaLOG) corrective regimen sliding scale   SubCutaneous every 6 hours  metoprolol tartrate Injectable 2.5 milliGRAM(s) IV Push every 6 hours  norepinephrine Infusion 0.05 MICROgram(s)/kG/Min (8.972 mL/Hr) IV Continuous <Continuous>  propofol Infusion 5 MICROgram(s)/kG/Min (2.871 mL/Hr) IV Continuous <Continuous>  QUEtiapine 50 milliGRAM(s) Oral every 12 hours  sodium bicarbonate 1300 milliGRAM(s) Oral every 8 hours    MEDICATIONS  (PRN):  acetaminophen    Suspension .. 650 milliGRAM(s) Oral every 6 hours PRN Temp greater or equal to 38C (100.4F)  dextrose 40% Gel 15 Gram(s) Oral once PRN Blood Glucose LESS THAN 70 milliGRAM(s)/deciliter  glucagon  Injectable 1 milliGRAM(s) IntraMuscular once PRN Glucose LESS THAN 70 milligrams/deciliter  ondansetron Injectable 4 milliGRAM(s) IV Push every 6 hours PRN Vomiting      PHYSICAL EXAM:  General: intubated and sedated, NAD  HEENT: NC/AT; PERRL, anicteric sclera; MMM; ETT and NGT/sump in place drained 150 cc fluid o/n  Neck: supple  Cardiovascular: +S1/S2, RRR  Respiratory: diffuse rhonchi  Gastrointestinal: soft but distended abdomen. nontender. nephrostomy draining dark green liquid. cystostomy draining dark brown liquid. nonhealing scab on abdomen w some bleeding  Extremities: no edema  Vascular: 2+ radial, DP/PT pulses B/L  Neurological: sedated   skin: no brusies or rashes

## 2019-06-25 NOTE — PROGRESS NOTE ADULT - ASSESSMENT
68 y/o morbidly obese M with h/o remote prostate ca s/p RT, bowel obstruction, right cystectomy with ileal conduit, HTN, spine surgery, chronic pain syndrome on methadone, CASEY, and progressive cognitive deficits per partner, p/w SOB, found to be in septic shock likely from obstructive pna and abnormal radiograph suggesting metastatic lung cancer, with fevers, seen for goals of care

## 2019-06-25 NOTE — PROGRESS NOTE ADULT - ATTENDING COMMENTS
The patient remains on MV with discomfort whenever sedation is lightened. Pressor needs have risen with diuresis. Given apparent metastatic disease palliative care has discussed with his HCP and he feels that patient would not want to be kept alive like this on ventilator with trach/PEG. He has proposed palliative extubation for tomorrow.  Critical care time rendered 40 minutes

## 2019-06-25 NOTE — PROGRESS NOTE ADULT - ASSESSMENT
70 y/o M PMH smoker (50 years and current), sleep apnea w/o cpap, short-term mem loss, HTN, DM2, chronic back pain s/p multiple back surgeries w/ hardware on methadone, anxiety, SBO s/p sx c/b wound that never healed, prostate cancer 1999 s/p radiation c/b radical cystectomy and nephrostomy w/ ileal conduit (in remission), p/w SOB, found to have severe acidemia, sepsis 2/2 pyelonephritis vs PNA, new onset suspected lung cancer w/ mets, admitted to MICU.     Neurology  #TME  agitation/ anxiety hx/ short term mem loss hx, likely 2/2 uremia vs sepsis vs baseline per partner when has short term mem loss and gets confused and agitated,  -became agitated and tachycardic off sedation. re-sedated w propofol at this time.   -c/w Seroquel 50 bid, monitor qtc    Cardiac  #mild aortic aneurysm  dilation of the aortic root and ascending aorta on ct, no known hx, no AR on echo  -control sBP <150     #hypotension  likely 2/2 sedation and sepsis, lactate improved to 2.0, increasing levo requirements  -c/w levo gtt    #htn  -currently on pressors - holding home anti-hypertensive medications    #fluid overload  pt grossly overloaded on CXR. s/p  lasix 120IV and 10mg metolazone w/ imrpvoement in cxr and UO that has since platueaud, pt not improving in CXR after several days of net negative  - monitor I/Os    Pulmonary  #lung cancer w/ mets  CT chest/ abd w/o contrast due to cr 4.5 showing 7cm RLL mass concerning for neoplasm w/ suspected hepatic and adrenal mets and abd LAD on ct, no known hx of lung cancer per partner, never been treated as pt was unaware, chronic smoker but never screened with CT per partner, dopplers neg for dvt, bronchoscopy 6/17 showing RML mucous plugging, sent for sputum cx and cytology, GOC per partner- living will no definitive decisions but leaves all GOC decisions to partner, pal care consulted, pt DNR, -suspect PNA is post-obstructive pna 2/2 mass. bronchoscopy negative for malignant cells- HCP discussed with IR and agreed risk too high for biopsy, poor pgx, hcp aware and thinking about making pt comfort care but still deciding/ processing  -pal care consulted, meeting again today for GOC (comfort care vs trach/ peg)  -oncology consulted    #smoker   50 years and current, no recent ct scans  -nicotine patch prn when pt not sedated    #sleep apnea  offered cpap at home but did not use  -currently intubated  -cpap at night when off mech vent    #COPD  no dx, no inhalers, emphysema on CT scan of chest  -duonebs q6 standing     #post obstructive PNA 2/2 suspected lung cancer  s/p 10 days of tito, continues to spike fevers (no increase in fever curve)  -cannot obtain source control given comorbidities not a candidate for surgery  -consider repeat ct chest if in line with goc    Renal  #HUGO on CKD  Cr 4.5 on admission, baseline cr 1.6 2/19 per PMD, cr improved and pt continues to make urine  -f/u renal recs  -monitor bmp  -possible HD in near future  -monitor UO   -renally dose medications    Metabolic  #metabolic acidosis   bicarb low, no AG, no diarrhea, likely 2/2 hyperchloremia from NS, improving  -monitor bmp  -renal consult as above  -keep meds in d5 not ns  -na bicarb 1300 tid      ID  # sepsis 2/2 post-obstructive PNA  on admission wbc 30, w/ tachypnea to 22 meets 2/4 sirs criteria afebrile, lactate 2.6. cxr clear, ct chest RLL PNA and under mass. ct scan showing perinephritic stranding and mod hydronephrosis concerning for UTI/ nephritis UA+, but urince cx neg. pt w/ distended gallbladder w/ stones concerning for cholecystitis but  HIDA scan neg, blood and sputum and urine cultures no growth, wound care for skin wound over abdomen (doesnt look infected, from SBO s/p sx c/b wound that never healed, ) penile dc cx grows e faecalis sensitive to amp/vanc. pt with no culture data other than e fecalis in penile cx, covered by meropenam (alos covering for empiric PNA and pyelo), lactate nomrlaized to 2, WBC still elevated  and pt still febrile, likely has post obstructive PNA without source control so continues to spike and have wbc, wbc has high eos suggestive 2/2 cancer  -as above under pulm    Endocrine  #DM2  a1c 7.4, on home metformin, fsgs controlled  -ISS  -add insulin regimen if needed    MSK  #chronic back pain  chronic back pain s/p multiple back surgeries w/ hardware on methadone, utox  positive for methadone only which per partner pt gest from pain specialist Dr Kwan, takes  5mg 2-4 pills qd   -methadone 5-20 mg qd, higher end if needed for pain control when off sedation      #cystectomy with nephrostomy  ct showing mod hydro but draining dark brown urine, R kidney atrophy, prostate cancer 1999 s/p radiation c/b radical cystectomy and nephrostomy w/ ileal conduit (in remission)  -urology consulted, f.u recs  -monitor UO from nephrostomy bag    GI  #npo   not tolerating tube feeds, vomited, s/p switch to sump and npo    #constipation  hx of sbo, no sbo on ct abd, belly benign on exam, now s/p 2 BMs am 6/19 and 1 6/20  -bowel regimen if needed    Heme  anemia, normocytic, likely 2/2 hugo on ckd and acd, cbc stable no s./s of bleed  -monitor CBC    OTHER  F: none  E: replete PRN but cautious in Hugo in CKD  N: npo   DVT ppx: HSQ 5000 q8, scds  other ppx: ppi  DNR, not DNI for now  dispo: MICU

## 2019-06-26 NOTE — PROGRESS NOTE ADULT - REASON FOR ADMISSION
acidemia

## 2019-06-26 NOTE — PROGRESS NOTE ADULT - PROBLEM SELECTOR PLAN 1
ADVANCE CARE PLANNING MEETING  START TIME: 1210  END TIME: 1235  TOTAL TIME: 25 mins    A FACE TO FACE MEETING TO DISCUSS ADVANCE CARE PLANNING WAS HELD TODAY REGARDING: JANIE ZABALA with Dr. Loving and Dr. Delcid from Heme/Onc  PRIMARY DECISION MAKER: Vinod Bo  ALTERNATE/SURROGATE: Vinod Bo is primary HCP  DISCUSSED ADVANCE DIRECTIVES INCLUDING, BUT NOT LIMITED TO, HEALTHCARE PROXY AND CODE STATUS.  DECISION REGARDING CODE STATUS: DNR  DOCUMENTATION COMPLETED TODAY: none    -pt's clinical course and the possible scenario of contiuuing a prolonged course of abx with a trach/peg discussed by Dr. Loving.  The very low likelihood pt. could receive any disease modifying treatment even if pt was able to receive a biopsy that did reveal cancer shared by Dr. Delcid. Option of comfort focused care with extubation and not reintubation with meds to control pain/SOB discussed as well.  HCP shared his negative experience with his mother who ultimately  in a SNF after a prolonged 6 month hospital course post ICH and stated "it probably would have been better if she had  initially and I don't want that for Philiprosendo".  HCP will discuss with pt's 3 cousins regarding todays mtg and agreed to readdress with staff tomorrow
cont. current treatment plan per ICU.  I spoke with partner/primary HCP Vinod Bo via phone regarding feedback from IR and Heme/Onc.  HCP reports speaking with Dr. Mitchell from IR as well and expresses his gratitude for his call and states "I will no longer agree to the biopsy because of all the risks the expert said".  Plan is for family mtg. with Heme Onc, ICU, and myself this upcoming Monday 6/24 at noon for further goals of care discussion
decision for palliative extubation with focus on comfort
voicemail left with HCP today to followup
Living Will in chart reviewed, cont. current management per ICU
cont. current treatment plan per ICU.  I spoke with partner/primary HCP Vinod Bo via phone from [phone], informed him of path from bronch which although did not reveal any malignant cells, it is still not absolute/certain that pt. is without any malignancy.  Discussed potential biopsy which he is in favor of and he wants input from Heme/Onc.  Partner reminded me again via phone that he had lung cancer himself and has gone through a similar process.  He also reports speaking with pt's PMD who is also his PMD for the last 30yrs and he too is in favor of a biopsy for a more definitive diagnosis "even if he can't get treated for it, but we won't know until we hear from the experts".
cont. current treatment plan per ICU.  Per partner, friend/alt HCP Candelaria Antonio is now aware of pt's suspected metastatic lung cancer and will be assisting him for any needed decision making

## 2019-06-26 NOTE — PROGRESS NOTE ADULT - SUBJECTIVE AND OBJECTIVE BOX
JANIE ZABALA   MRN-[id number]         CC: Patient is a 69y old  Male who presents with a chief complaint of acidemia (2019 12:27)    HPI:  70 y/o M PMH HTN, smoker, chronic back pain, s/p radical cystectomy w/ one remaining kidney w/ nephrostomy /w SOB x 3 days. Pt denies fever, cough, CP. Pt in pain in R back. No belly pain. Could not provide hx of when had procedures or smoking hx.    In the ED, pt afebrile, HR 72, satting 100% on 3L NC, RR 22-->18, BP 90/60-->120s. Labs significant for WBC 30, K 5.7, bicarb 11, AG 20, VBG pH 7.12-->7.08, CO2 40, lactate 2.6, BUN ~70, Cr ~4, unknown baseline. Carboxyhemoglobin slightly elevated at 2.6. CXR clear. CT chest abd concerning for metastatic lung cancer, and possible cholecystitis and nephritis w/ hydro. S/p vanc, cefepime, ketamine, bicarb drip 150/hr, insulin/ d50, LR 1 L bolus. Transferred to [facility] MICU for emergent HD and further care. On arrival, renal and urology consulted. Pt putting out urine into nephrostomy tube and abg improved so no urgent HD, but renal will monitor and start HD if needed. Pt started on vanc/ zosyn for pyelonephritis and broad coverage while r/o pna and cholecystitis, or infected surgical wound. (2019 15:29)    SUBJECTIVE: pressor requirements are up. Decision made yesterday for palliative extubtion today and focus on comfort at the end of life.  Patient presently on propofol and pressors with BP in the 90s.  Decision made consistent with patient's known and documented wishes  ROS:  UNABLE TO OBTAIN  due to: patient sedated on propofol    DYSPNEA (Y/N): resp rate in 40's  N/V (Y/N):	N  SECRETIONS (Y/N):	N  AGITATION (Y/N):     was noted to have agitation several hours after coming off propofol earlier in the week  PAIN(Y/N):        -Provocation/Palliation:     -Quality/Quantity:     -Radiating:     -Severity:     -Timing/Frequency:     -Impact on ADLs:      PHYSICAL EXAM:  Vital Signs Last 24 Hrs  T(C): 37.8 (2019 13:09), Max: 38 (2019 01:20)  T(F): 100 (2019 13:09), Max: 100.4 (2019 01:20)  HR: 124 (2019 17:00) (90 - 124)  BP: 83/48 (2019 17:00) (73/48 - 120/55)  BP(mean): 59 (2019 17:00) (58 - 86)  RR: 47 (2019 17:00) (18 - 47)  SpO2: 87% (2019 17:00) (87% - 100%)    GENERAL: Critically ill gentleman appearing sicker  HEENT: no spont. eye opening     	        CVS: SR on ECG           	  RESP: orally intubated ETT to CMV on vent       	  GI: NGT           	  : ileal conduit          	  MUSC: no spont. mov't to extremities noted      	  NEURO: sedated on propofol   	      ALLERGIES:  No Known Allergies    OPIATE NAÏVE (Y/N): y  -iStop reviewed (Y/N): Y    MEDICATIONS: reviewed  MEDICATIONS  (STANDING):  ALBUTerol/ipratropium for Nebulization 3 milliLiter(s) Nebulizer every 6 hours  amiodarone    Tablet 200 milliGRAM(s) Oral daily  chlorhexidine 0.12% Liquid 15 milliLiter(s) Oral Mucosa two times a day  chlorhexidine 4% Liquid 1 Application(s) Topical <User Schedule>  dextrose 5%. 1000 milliLiter(s) (50 mL/Hr) IV Continuous <Continuous>  dextrose 50% Injectable 12.5 Gram(s) IV Push once  dextrose 50% Injectable 25 Gram(s) IV Push once  dextrose 50% Injectable 25 Gram(s) IV Push once  heparin  Injectable 7500 Unit(s) SubCutaneous every 8 hours  insulin lispro (HumaLOG) corrective regimen sliding scale   SubCutaneous every 6 hours  metoprolol tartrate Injectable 2.5 milliGRAM(s) IV Push every 6 hours  norepinephrine Infusion 0.05 MICROgram(s)/kG/Min (8.972 mL/Hr) IV Continuous <Continuous>  pantoprazole    Tablet 40 milliGRAM(s) Oral before breakfast  propofol Infusion 5 MICROgram(s)/kG/Min (2.871 mL/Hr) IV Continuous <Continuous>  QUEtiapine 50 milliGRAM(s) Oral every 12 hours  sodium bicarbonate 1300 milliGRAM(s) Oral every 8 hours    MEDICATIONS  (PRN):  acetaminophen    Suspension .. 650 milliGRAM(s) Oral every 6 hours PRN Temp greater or equal to 38C (100.4F)  dextrose 40% Gel 15 Gram(s) Oral once PRN Blood Glucose LESS THAN 70 milliGRAM(s)/deciliter  glucagon  Injectable 1 milliGRAM(s) IntraMuscular once PRN Glucose LESS THAN 70 milligrams/deciliter  ondansetron Injectable 4 milliGRAM(s) IV Push every 6 hours PRN Vomiting    LABS: reviewed    none new today                        10.2   42.03 )-----------( 560      ( 2019 07:04 )             33.0         142  |  104  |  58<H>  ----------------------------<  134<H>  4.1   |  21<L>  |  2.23<H>    Ca    8.9      2019 07:03  Phos  5.3       Mg     1.9         TPro  7.5  /  Alb  2.5<L>  /  TBili  0.3  /  DBili  x   /  AST  33  /  ALT  14  /  AlkPhos  144<H>  25    LIVER FUNCTIONS - ( 2019 07:03 )  Alb: 2.5 g/dL / Pro: 7.5 g/dL / ALK PHOS: 144 U/L / ALT: 14 U/L / AST: 33 U/L / GGT: x           PT/INR - ( 2019 07:04 )   PT: 13.5 sec;   INR: 1.19     PTT - ( 2019 07:04 )  PTT:31.2 sec    IMAGING: reviewed    ADVANCE DIRECTIVES: DNR  Living Will   Decision maker: pt without capacity to make med decisions at this time, primary HCP is partner Vinod Bo  Legal surrogate: alt HCP on pt's Living Will is  sister Vaughn Harper, second alt HCP is friend Cadnelaria Antonio [phone] (c), [phone] (H)    PSYCHOSOCIAL-SPIRITUAL ASSESSMENT:  Reviewed	    GOALS OF CARE DISCUSSION:  Palliative care info/counseling provided	       See previous Palliative Medicine Note  Documentation of GOC: decision made for palliative extubation later today    AGENCY CHOICE DISCUSSED:   none          REFERRALS:	   Palliative Med   Unit SW/Case Mgmt  -HCP declined  Nutrition

## 2019-06-26 NOTE — PROGRESS NOTE ADULT - PROBLEM SELECTOR PROBLEM 2
Palliative care by specialist

## 2019-06-26 NOTE — PROGRESS NOTE ADULT - SUBJECTIVE AND OBJECTIVE BOX
Patient is a 69y old  Male who presents with a chief complaint of acidemia (25 Jun 2019 13:46)      INTERVAL HPI/OVERNIGHT EVENTS: Pt seen and examined at bedside.       ICU Vital Signs Last 24 Hrs  T(C): 37.4 (26 Jun 2019 05:45), Max: 38 (26 Jun 2019 01:20)  T(F): 99.3 (26 Jun 2019 05:45), Max: 100.4 (26 Jun 2019 01:20)  HR: 98 (26 Jun 2019 08:00) (90 - 118)  BP: 92/54 (26 Jun 2019 08:00) (73/48 - 120/70)  BP(mean): 67 (26 Jun 2019 08:00) (56 - 86)  ABP: --  ABP(mean): --  RR: 30 (26 Jun 2019 08:00) (21 - 33)  SpO2: 98% (26 Jun 2019 08:00) (97% - 100%)    I&O's Summary    25 Jun 2019 07:01  -  26 Jun 2019 07:00  --------------------------------------------------------  IN: 1679.6 mL / OUT: 1570 mL / NET: 109.6 mL    26 Jun 2019 07:01  -  26 Jun 2019 08:04  --------------------------------------------------------  IN: 45.9 mL / OUT: 80 mL / NET: -34.1 mL      Mode: AC/ CMV (Assist Control/ Continuous Mandatory Ventilation)  RR (machine): 18  TV (machine): 550  FiO2: 40  PEEP: 5  ITime: 1  MAP: 12  PIP: 24      LABS:                        10.2   42.03 )-----------( 560      ( 25 Jun 2019 07:04 )             33.0     06-25    142  |  104  |  58<H>  ----------------------------<  134<H>  4.1   |  21<L>  |  2.23<H>    Ca    8.9      25 Jun 2019 07:03  Phos  5.3     06-25  Mg     1.9     06-25    TPro  7.5  /  Alb  2.5<L>  /  TBili  0.3  /  DBili  x   /  AST  33  /  ALT  14  /  AlkPhos  144<H>  06-25    PT/INR - ( 25 Jun 2019 07:04 )   PT: 13.5 sec;   INR: 1.19          PTT - ( 25 Jun 2019 07:04 )  PTT:31.2 sec    CAPILLARY BLOOD GLUCOSE      POCT Blood Glucose.: 132 mg/dL (26 Jun 2019 05:45)  POCT Blood Glucose.: 124 mg/dL (25 Jun 2019 22:41)  POCT Blood Glucose.: 160 mg/dL (25 Jun 2019 18:57)  POCT Blood Glucose.: 149 mg/dL (25 Jun 2019 11:28)        RADIOLOGY & ADDITIONAL TESTS:    Consultant(s) Notes Reviewed:  [x ] YES  [ ] NO    MEDICATIONS  (STANDING):  ALBUTerol/ipratropium for Nebulization 3 milliLiter(s) Nebulizer every 6 hours  amiodarone    Tablet 200 milliGRAM(s) Oral daily  chlorhexidine 0.12% Liquid 15 milliLiter(s) Oral Mucosa two times a day  chlorhexidine 4% Liquid 1 Application(s) Topical <User Schedule>  dextrose 5%. 1000 milliLiter(s) (50 mL/Hr) IV Continuous <Continuous>  dextrose 50% Injectable 12.5 Gram(s) IV Push once  dextrose 50% Injectable 25 Gram(s) IV Push once  dextrose 50% Injectable 25 Gram(s) IV Push once  heparin  Injectable 7500 Unit(s) SubCutaneous every 8 hours  insulin lispro (HumaLOG) corrective regimen sliding scale   SubCutaneous every 6 hours  metoprolol tartrate Injectable 2.5 milliGRAM(s) IV Push every 6 hours  norepinephrine Infusion 0.05 MICROgram(s)/kG/Min (8.972 mL/Hr) IV Continuous <Continuous>  pantoprazole  Injectable 40 milliGRAM(s) IV Push every 24 hours  propofol Infusion 5 MICROgram(s)/kG/Min (2.871 mL/Hr) IV Continuous <Continuous>  QUEtiapine 50 milliGRAM(s) Oral every 12 hours  sodium bicarbonate 1300 milliGRAM(s) Oral every 8 hours    MEDICATIONS  (PRN):  acetaminophen    Suspension .. 650 milliGRAM(s) Oral every 6 hours PRN Temp greater or equal to 38C (100.4F)  dextrose 40% Gel 15 Gram(s) Oral once PRN Blood Glucose LESS THAN 70 milliGRAM(s)/deciliter  glucagon  Injectable 1 milliGRAM(s) IntraMuscular once PRN Glucose LESS THAN 70 milligrams/deciliter  ondansetron Injectable 4 milliGRAM(s) IV Push every 6 hours PRN Vomiting        PHYSICAL EXAM:  General: intubated and sedated, NAD  HEENT: NC/AT; PERRL, anicteric sclera; MMM; ETT and NGT in place  Neck: supple  Cardiovascular: +S1/S2, tachycardic  Respiratory: diffuse rhonchi  Gastrointestinal: soft but distended abdomen. notender. nephrostomy draining dark green liquid. cystostomy draining dark brown liquid. nonhealing scab on abdomen w some bleeding  Extremities: b/l trace LE edema to ankles  Vascular: 2+ radial, DP/PT pulses B/L  Neurological: sedated but moves extremities Patient is a 69y old  Male who presents with a chief complaint of acidemia (25 Jun 2019 13:46)      INTERVAL HPI/OVERNIGHT EVENTS: Pt seen and examined at bedside.       ICU Vital Signs Last 24 Hrs  T(C): 37.4 (26 Jun 2019 05:45), Max: 38 (26 Jun 2019 01:20)  T(F): 99.3 (26 Jun 2019 05:45), Max: 100.4 (26 Jun 2019 01:20)  HR: 98 (26 Jun 2019 08:00) (90 - 118)  BP: 92/54 (26 Jun 2019 08:00) (73/48 - 120/70)  BP(mean): 67 (26 Jun 2019 08:00) (56 - 86)  ABP: --  ABP(mean): --  RR: 30 (26 Jun 2019 08:00) (21 - 33)  SpO2: 98% (26 Jun 2019 08:00) (97% - 100%)    I&O's Summary    25 Jun 2019 07:01  -  26 Jun 2019 07:00  --------------------------------------------------------  IN: 1679.6 mL / OUT: 1570 mL / NET: 109.6 mL    26 Jun 2019 07:01  -  26 Jun 2019 08:04  --------------------------------------------------------  IN: 45.9 mL / OUT: 80 mL / NET: -34.1 mL      Mode: AC/ CMV (Assist Control/ Continuous Mandatory Ventilation)  RR (machine): 18  TV (machine): 550  FiO2: 40  PEEP: 5  ITime: 1  MAP: 12  PIP: 24      LABS:                        10.2   42.03 )-----------( 560      ( 25 Jun 2019 07:04 )             33.0     06-25    142  |  104  |  58<H>  ----------------------------<  134<H>  4.1   |  21<L>  |  2.23<H>    Ca    8.9      25 Jun 2019 07:03  Phos  5.3     06-25  Mg     1.9     06-25    TPro  7.5  /  Alb  2.5<L>  /  TBili  0.3  /  DBili  x   /  AST  33  /  ALT  14  /  AlkPhos  144<H>  06-25    PT/INR - ( 25 Jun 2019 07:04 )   PT: 13.5 sec;   INR: 1.19          PTT - ( 25 Jun 2019 07:04 )  PTT:31.2 sec    CAPILLARY BLOOD GLUCOSE      POCT Blood Glucose.: 132 mg/dL (26 Jun 2019 05:45)  POCT Blood Glucose.: 124 mg/dL (25 Jun 2019 22:41)  POCT Blood Glucose.: 160 mg/dL (25 Jun 2019 18:57)  POCT Blood Glucose.: 149 mg/dL (25 Jun 2019 11:28)        RADIOLOGY & ADDITIONAL TESTS:    Consultant(s) Notes Reviewed:  [x ] YES  [ ] NO    MEDICATIONS  (STANDING):  ALBUTerol/ipratropium for Nebulization 3 milliLiter(s) Nebulizer every 6 hours  amiodarone    Tablet 200 milliGRAM(s) Oral daily  chlorhexidine 0.12% Liquid 15 milliLiter(s) Oral Mucosa two times a day  chlorhexidine 4% Liquid 1 Application(s) Topical <User Schedule>  dextrose 5%. 1000 milliLiter(s) (50 mL/Hr) IV Continuous <Continuous>  dextrose 50% Injectable 12.5 Gram(s) IV Push once  dextrose 50% Injectable 25 Gram(s) IV Push once  dextrose 50% Injectable 25 Gram(s) IV Push once  heparin  Injectable 7500 Unit(s) SubCutaneous every 8 hours  insulin lispro (HumaLOG) corrective regimen sliding scale   SubCutaneous every 6 hours  metoprolol tartrate Injectable 2.5 milliGRAM(s) IV Push every 6 hours  norepinephrine Infusion 0.05 MICROgram(s)/kG/Min (8.972 mL/Hr) IV Continuous <Continuous>  pantoprazole  Injectable 40 milliGRAM(s) IV Push every 24 hours  propofol Infusion 5 MICROgram(s)/kG/Min (2.871 mL/Hr) IV Continuous <Continuous>  QUEtiapine 50 milliGRAM(s) Oral every 12 hours  sodium bicarbonate 1300 milliGRAM(s) Oral every 8 hours    MEDICATIONS  (PRN):  acetaminophen    Suspension .. 650 milliGRAM(s) Oral every 6 hours PRN Temp greater or equal to 38C (100.4F)  dextrose 40% Gel 15 Gram(s) Oral once PRN Blood Glucose LESS THAN 70 milliGRAM(s)/deciliter  glucagon  Injectable 1 milliGRAM(s) IntraMuscular once PRN Glucose LESS THAN 70 milligrams/deciliter  ondansetron Injectable 4 milliGRAM(s) IV Push every 6 hours PRN Vomiting      PHYSICAL EXAM:  General: intubated and sedated, NAD  HEENT: NC/AT; PERRL, anicteric sclera; MMM; ETT and NGT in place  Neck: supple  Cardiovascular: +S1/S2, tachycardic  Respiratory: diffuse rhonchi  Gastrointestinal: soft but distended abdomen. notender. nephrostomy draining dark green liquid. cystostomy draining dark brown liquid. nonhealing scab on abdomen w some bleeding  Extremities: b/l trace LE edema to ankles  Vascular: 2+ radial, DP/PT pulses B/L  Neurological: sedated

## 2019-06-26 NOTE — PROGRESS NOTE ADULT - PROVIDER SPECIALTY LIST ADULT
MICU
Nephrology
Palliative Care
Nephrology

## 2019-06-26 NOTE — CHART NOTE - NSCHARTNOTEFT_GEN_A_CORE
Admitting Diagnosis:   Patient is a 69y old  Male who presents with a chief complaint of acidemia (26 Jun 2019 08:03)      PAST MEDICAL & SURGICAL HISTORY:  Chronic back pain  HTN (hypertension)  H/O total cystectomy      Current Nutrition Order:  NPO    PO Intake: Good (%) [   ]  Fair (50-75%) [   ] Poor (<25%) [   ]- N/A NPO    GI Issues: Unable to assess at this time 2/2 vent     Pain: Unable to assess at this time 2/2 vent; sedated    Skin Integrity: Jean 9  midline surgical wound  urostomy   IAD    Labs:   06-25    142  |  104  |  58<H>  ----------------------------<  134<H>  4.1   |  21<L>  |  2.23<H>    Ca    8.9      25 Jun 2019 07:03  Phos  5.3     06-25  Mg     1.9     06-25    TPro  7.5  /  Alb  2.5<L>  /  TBili  0.3  /  DBili  x   /  AST  33  /  ALT  14  /  AlkPhos  144<H>  06-25    CAPILLARY BLOOD GLUCOSE      POCT Blood Glucose.: 132 mg/dL (26 Jun 2019 05:45)  POCT Blood Glucose.: 124 mg/dL (25 Jun 2019 22:41)  POCT Blood Glucose.: 160 mg/dL (25 Jun 2019 18:57)  POCT Blood Glucose.: 149 mg/dL (25 Jun 2019 11:28)      Medications:  MEDICATIONS  (STANDING):  ALBUTerol/ipratropium for Nebulization 3 milliLiter(s) Nebulizer every 6 hours  amiodarone    Tablet 200 milliGRAM(s) Oral daily  chlorhexidine 0.12% Liquid 15 milliLiter(s) Oral Mucosa two times a day  chlorhexidine 4% Liquid 1 Application(s) Topical <User Schedule>  dextrose 5%. 1000 milliLiter(s) (50 mL/Hr) IV Continuous <Continuous>  dextrose 50% Injectable 12.5 Gram(s) IV Push once  dextrose 50% Injectable 25 Gram(s) IV Push once  dextrose 50% Injectable 25 Gram(s) IV Push once  heparin  Injectable 7500 Unit(s) SubCutaneous every 8 hours  insulin lispro (HumaLOG) corrective regimen sliding scale   SubCutaneous every 6 hours  metoprolol tartrate Injectable 2.5 milliGRAM(s) IV Push every 6 hours  norepinephrine Infusion 0.05 MICROgram(s)/kG/Min (8.972 mL/Hr) IV Continuous <Continuous>  pantoprazole  Injectable 40 milliGRAM(s) IV Push every 24 hours  propofol Infusion 5 MICROgram(s)/kG/Min (2.871 mL/Hr) IV Continuous <Continuous>  QUEtiapine 50 milliGRAM(s) Oral every 12 hours  sodium bicarbonate 1300 milliGRAM(s) Oral every 8 hours    MEDICATIONS  (PRN):  acetaminophen    Suspension .. 650 milliGRAM(s) Oral every 6 hours PRN Temp greater or equal to 38C (100.4F)  dextrose 40% Gel 15 Gram(s) Oral once PRN Blood Glucose LESS THAN 70 milliGRAM(s)/deciliter  glucagon  Injectable 1 milliGRAM(s) IntraMuscular once PRN Glucose LESS THAN 70 milligrams/deciliter  ondansetron Injectable 4 milliGRAM(s) IV Push every 6 hours PRN Vomiting      Weight: 95.7kg   Daily     Daily     Weight Change: No new weights recorded since admit     Nutrition Focused Physical Exam: Completed [   ]  Not Pertinent [ X  ]    Estimated energy needs: Ideal body weight (58.9kg) used for calculations as pt >120% of IBW. Needs estimated for maintenance in older adults; adjusted for vent.  Calories: 25-30 kcal/kg = 6999-1256 kcal/day  Protein: 1.4-1.6 g/kg = 82-94g protein/day  Fluids: 30-35 mL/kg = 1076-0323 mL/day    Subjective: 68 yo/male with PMHx HTN, chronic back pain, s/p radical cystectomy, nephrostomy in remaining kidney, admitted with SOB and found to have severe acidemia. CT chest c/f lung cancer w/mets. S/p bronch on 6/17, cytology negative. IR did not perform biopsy as too high risk. Pt remains intubated on VC/AC mode, sedated on propofol, and requiring levo for BP support. Palliative having ongoing GOC discussions, and now planned for palliative extubation today. Will continue to follow per RD protocol.     Previous Nutrition Diagnosis:  Increased protein-calorie needs RT increased demand for protein-calorie intake AEB vent     Active [ X  ]  Resolved [   ]    If resolved, new PES:     Goal: Nutrition will be kept aligned with GOC     Recommendations:  1. Keep nutrition aligned with GOC at all times   2. Pain control per team  3. Please re-consult if GOC to change    Education: N/A- vent     Risk Level: High [   ] Moderate [ X  ] Low [   ]

## 2019-06-26 NOTE — PROGRESS NOTE ADULT - PROBLEM SELECTOR PLAN 2
DNR. Support provided to pt and family via phone. Patient to have access to supportive services during rest of hospital stay as the pt/family deems necessary i.e. Chaplaincy, Massage Therapy, Music Therapy, Pt/family supportive services, Palliative SW, etc.  As discussed during the palliative IDT meeting, the patients PSSA screening did not identify any current psychosocial need or spiritual support deficits.
DNR. Support provided to pt and family via phone. Patient to have access to supportive services during rest of hospital stay as the pt/family deems necessary i.e. Chaplaincy, Massage Therapy, Music Therapy, Pt/family supportive services, Palliative SW, etc.  As discussed during the palliative IDT meeting, the patients PSSA screening did not identify any current psychosocial need or spiritual support deficits.
DNR. Support provided to pt. Patient to have access to supportive services during rest of hospital stay as the pt/family deems necessary i.e. Chaplaincy, Massage Therapy, Music Therapy, Pt/family supportive services, Palliative SW, etc.  As discussed during the palliative IDT meeting, the patients PSSA screening did not identify any current psychosocial need or spiritual support deficits.
DNR. Support provided to pt. Patient to have access to supportive services during rest of hospital stay as the pt/family deems necessary i.e. Chaplaincy, Massage Therapy, Music Therapy, Pt/family supportive services, Palliative SW, etc.  As discussed during the palliative IDT meeting, the patients PSSA screening did not identify any current psychosocial need or spiritual support deficits.    Recommend morphine drip at 1mg/hour to start, with bolus of 2 mg q 15 minutes as needed.  Would stop propofol, be sure he appears comfortable and then extubate.  IV Ativan may also be necessary.    Increase morphine drip and size of bolus if he remains uncomfortable
DNR. Patient to have access to supportive services during rest of hospital stay as the pt/family deems necessary i.e. Chaplaincy, Massage Therapy, Music Therapy, Pt/family supportive services, Palliative SW, etc.  PSSA pending.  Rapport already established with partner/primary HCP who has my contact info
DNR. Support provided to pt and family via phone. Partner Vinod updated on pt's status via phone ([phone]) Patient to have access to supportive services during rest of hospital stay as the pt/family deems necessary i.e. Chaplaincy, Massage Therapy, Music Therapy, Pt/family supportive services, Palliative SW, etc.  As discussed during the palliative IDT meeting, the patients PSSA screening did not identify any current psychosocial need or spiritual support deficits.
DNR. Support provided to pt and family via phone. Patient to have access to supportive services during rest of hospital stay as the pt/family deems necessary i.e. Chaplaincy, Massage Therapy, Music Therapy, Pt/family supportive services, Palliative SW, etc.  As discussed during the palliative IDT meeting, the patients PSSA screening did not identify any current psychosocial need or spiritual support deficits.

## 2019-06-26 NOTE — PROGRESS NOTE ADULT - ASSESSMENT
68 y/o M PMH HTN, chronic back pain, s/p radical cystectomy p/w SOB, found to have severe metabolic and respiratory acidemia, urosepsis with likely left pyelonephritis, with likely acute ATN on CKD, severe dehydration, likely metastatic cancer, admitted to MICU for emergent HD and further workup.    # Non-oliguric HUGO on unknown prior baseline renal function (admission S cr 4.7) with stable renal function with S cr 2.2 likely ischemic ATN from septic shock and post obstructive with hydronephrosis  - NO new labs available.  - Last 24 hours I/o with net neutral balance.   - Off diuretic drip  - Volume status and electrolytes noted.  - Maintain MAP>65-70 mm Hg   - Adjust antibiotics as per renal dose clearance  - For possible palliative extubation  - No need for RRT/HD    # Acute respiratory failure with lung mass:  Plan pe primary  team  goals of care per primary ICu team/palliative care team with possible planned palliative extubation later today.    Will sign off for now. Reconsult as needed. 68 y/o M PMH HTN, chronic back pain, s/p radical cystectomy p/w SOB, found to have severe metabolic and respiratory acidemia, urosepsis with likely left pyelonephritis, with likely acute ATN on CKD, severe dehydration, likely metastatic cancer, admitted to MICU for emergent HD and further workup.    # Non-oliguric HUGO on unknown prior baseline renal function (admission S cr 4.7) with stable renal function with S cr 2.2 likely ischemic ATN from septic shock and post obstructive with hydronephrosis  - No new labs available.  - Last 24 hours I/o with net neutral balance.   - Off diuretic drip  - Volume status and electrolytes noted.  - Maintain MAP>65-70 mm Hg   - Adjust antibiotics as per renal dose clearance  - For possible palliative extubation  - No need for RRT/HD    # Acute respiratory failure with lung mass:  Plan pe primary  team  goals of care per primary ICu team/palliative care team with possible planned palliative extubation later today.    Will sign off for now. Reconsult as needed.

## 2019-06-26 NOTE — PROGRESS NOTE ADULT - SUBJECTIVE AND OBJECTIVE BOX
Patient is a 69y Male seen and evaluated at bedside. Patient remains critically ill on ventilatory support and on IV pressors. No new labs available. Interval discussion with palliative care for planned extubation later today      acetaminophen    Suspension .. 650 every 6 hours PRN  ALBUTerol/ipratropium for Nebulization 3 every 6 hours  amiodarone    Tablet 200 daily  chlorhexidine 0.12% Liquid 15 two times a day  chlorhexidine 4% Liquid 1 <User Schedule>  dextrose 40% Gel 15 once PRN  dextrose 5%. 1000 <Continuous>  dextrose 50% Injectable 12.5 once  dextrose 50% Injectable 25 once  dextrose 50% Injectable 25 once  glucagon  Injectable 1 once PRN  heparin  Injectable 7500 every 8 hours  insulin lispro (HumaLOG) corrective regimen sliding scale  every 6 hours  metoprolol tartrate Injectable 2.5 every 6 hours  norepinephrine Infusion 0.05 <Continuous>  ondansetron Injectable 4 every 6 hours PRN  pantoprazole  Injectable 40 every 24 hours  propofol Infusion 5 <Continuous>  QUEtiapine 50 every 12 hours  sodium bicarbonate 1300 every 8 hours      Allergies    No Known Allergies    Intolerances        T(C): , Max: 38 (06-26-19 @ 01:20)  T(F): , Max: 100.4 (06-26-19 @ 01:20)  HR: 98 (06-26-19 @ 10:00)  BP: 83/52 (06-26-19 @ 10:00)  BP(mean): 63 (06-26-19 @ 10:00)  RR: 21 (06-26-19 @ 10:00)  SpO2: 98% (06-26-19 @ 10:00)  Wt(kg): --    06-25 @ 07:01  -  06-26 @ 07:00  --------------------------------------------------------  IN: 1679.6 mL / OUT: 1570 mL / NET: 109.6 mL    06-26 @ 07:01  -  06-26 @ 10:55  --------------------------------------------------------  IN: 149.3 mL / OUT: 80 mL / NET: 69.3 mL          Review of Systems:  Limited.      PHYSICAL EXAM:  GENERAL: Ill appearing, lying in bed, sedated and intubated on IV pressors.  HEAD:  Atraumatic, Normocephalic,   EYES: Bilateral conjunctival and scleral pallor   Oral cavity: Oral mucosa dry and pink  NECK: Neck supple, No JVD, ETT in place.  CHEST/LUNG: Bilateral decreased breath sounds, Right>left, Bibasilar crepitations, no wheezing  HEART: Regular rate and rhythm. KJ II/VI at LPSB, No gallop, no rub   ABDOMEN: Soft, Nontender, non distended, bowel sounds, Ileostomy/nephrostomy present   EXTREMITIES: No clubbing, cyanosis, or edema  Neurology: AAOx0, sedated and intubated on ventilatory support.  SKIN: No rashes or lesions      LABS:                        10.2   42.03 )-----------( 560      ( 25 Jun 2019 07:04 )             33.0     06-25    142  |  104  |  58<H>  ----------------------------<  134<H>  4.1   |  21<L>  |  2.23<H>    Ca    8.9      25 Jun 2019 07:03  Phos  5.3     06-25  Mg     1.9     06-25    TPro  7.5  /  Alb  2.5<L>  /  TBili  0.3  /  DBili  x   /  AST  33  /  ALT  14  /  AlkPhos  144<H>  06-25      PT/INR - ( 25 Jun 2019 07:04 )   PT: 13.5 sec;   INR: 1.19          PTT - ( 25 Jun 2019 07:04 )  PTT:31.2 sec          RADIOLOGY & ADDITIONAL STUDIES:  < from: Xray Chest 1 View- PORTABLE-Routine (06.25.19 @ 06:37) >    EXAM:  XR CHEST PORTABLE ROUTINE 1V                          PROCEDURE DATE:  06/25/2019          INTERPRETATION:  CLINICAL INDICATION: 69-year-old with shortness of   breath and lung cancer.      IMPRESSION: Frontal view of the chest is compared to 8 6/24/2019 and   demonstrates endotracheal tube in appropriate position. NG tube tip below   the level of the diaphragm. Anterior cervical fusion hardware.   Cardiomegaly. Central venous catheter with the tip in the SVC. Persistent   dense right basilar consolidation and large right pleural effusion.   Progressive severe interstitial and alveolar pulmonary edema.            Thank you for the opportunity to participate in the care of this patient.        PRISCILLA COTTON M.D., ATTENDING RADIOLOGIST  This document has been electronically signed. Jun 25 2019  2:25PM                  < end of copied text >

## 2019-06-26 NOTE — PROGRESS NOTE ADULT - NSHPATTENDINGPLANDISCUSS_GEN_ALL_CORE
as above
primary RN
Dr. Perales
ICU Fellow
primary RN
Dr. Loving, [facility] Team
as above
primary RN

## 2019-06-26 NOTE — PROGRESS NOTE ADULT - ASSESSMENT
68 y/o M PMH smoker (50 years and current), sleep apnea w/o cpap, short-term mem loss, HTN, DM2, chronic back pain s/p multiple back surgeries w/ hardware on methadone, anxiety, SBO s/p sx c/b wound that never healed, prostate cancer 1999 s/p radiation c/b radical cystectomy and nephrostomy w/ ileal conduit (in remission), p/w SOB, found to have severe acidemia, sepsis 2/2 pyelonephritis vs PNA, new onset suspected lung cancer w/ mets, admitted to MICU.     Neurology  #TME  agitation/ anxiety hx/ short term mem loss hx, likely 2/2 uremia vs sepsis vs baseline per partner when has short term mem loss and gets confused and agitated,  -became agitated and tachycardic off sedation. re-sedated w propofol at this time.   -c/w Seroquel 50 bid, monitor qtc    Cardiac  #mild aortic aneurysm  dilation of the aortic root and ascending aorta on ct, no known hx, no AR on echo  -control sBP <150     #hypotension  likely 2/2 sedation and sepsis, lactate improved to 2.0, increasing levo requirements  -c/w levo gtt    #htn  -currently on pressors - holding home anti-hypertensive medications    #fluid overload  pt grossly overloaded on CXR. s/p  lasix 120IV and 10mg metolazone w/ imrpvoement in cxr and UO that has since platueaud, pt not improving in CXR after several days of net negative  - monitor I/Os    Pulmonary  #lung cancer w/ mets  CT chest/ abd w/o contrast due to cr 4.5 showing 7cm RLL mass concerning for neoplasm w/ suspected hepatic and adrenal mets and abd LAD on ct, no known hx of lung cancer per partner, never been treated as pt was unaware, chronic smoker but never screened with CT per partner, dopplers neg for dvt, bronchoscopy 6/17 showing RML mucous plugging, sent for sputum cx and cytology, GOC per partner- living will no definitive decisions but leaves all GOC decisions to partner, pal care consulted, pt DNR, -suspect PNA is post-obstructive pna 2/2 mass. bronchoscopy negative for malignant cells- HCP discussed with IR and agreed risk too high for biopsy, poor pgx, hcp aware and thinking about making pt comfort care but still deciding/ processing  -pal care consulted, meeting again today for GOC (comfort care vs trach/ peg)  -oncology consulted    #smoker   50 years and current, no recent ct scans  -nicotine patch prn when pt not sedated    #sleep apnea  offered cpap at home but did not use  -currently intubated  -cpap at night when off mech vent    #COPD  no dx, no inhalers, emphysema on CT scan of chest  -duonebs q6 standing     #post obstructive PNA 2/2 suspected lung cancer  s/p 10 days of tito, continues to spike fevers (no increase in fever curve)  -cannot obtain source control given comorbidities not a candidate for surgery  -consider repeat ct chest if in line with goc    Renal  #HUGO on CKD  Cr 4.5 on admission, baseline cr 1.6 2/19 per PMD, cr improved and pt continues to make urine  -f/u renal recs  -monitor bmp  -possible HD in near future  -monitor UO   -renally dose medications    Metabolic  #metabolic acidosis   bicarb low, no AG, no diarrhea, likely 2/2 hyperchloremia from NS, improving  -monitor bmp  -renal consult as above  -keep meds in d5 not ns  -na bicarb 1300 tid      ID  # sepsis 2/2 post-obstructive PNA  on admission wbc 30, w/ tachypnea to 22 meets 2/4 sirs criteria afebrile, lactate 2.6. cxr clear, ct chest RLL PNA and under mass. ct scan showing perinephritic stranding and mod hydronephrosis concerning for UTI/ nephritis UA+, but urince cx neg. pt w/ distended gallbladder w/ stones concerning for cholecystitis but  HIDA scan neg, blood and sputum and urine cultures no growth, wound care for skin wound over abdomen (doesnt look infected, from SBO s/p sx c/b wound that never healed, ) penile dc cx grows e faecalis sensitive to amp/vanc. pt with no culture data other than e fecalis in penile cx, covered by meropenam (alos covering for empiric PNA and pyelo), lactate nomrlaized to 2, WBC still elevated  and pt still febrile, likely has post obstructive PNA without source control so continues to spike and have wbc, wbc has high eos suggestive 2/2 cancer  -as above under pulm    Endocrine  #DM2  a1c 7.4, on home metformin, fsgs controlled  -ISS  -add insulin regimen if needed    MSK  #chronic back pain  chronic back pain s/p multiple back surgeries w/ hardware on methadone, utox  positive for methadone only which per partner pt gest from pain specialist Dr Kwan, takes  5mg 2-4 pills qd   -methadone 5-20 mg qd, higher end if needed for pain control when off sedation      #cystectomy with nephrostomy  ct showing mod hydro but draining dark brown urine, R kidney atrophy, prostate cancer 1999 s/p radiation c/b radical cystectomy and nephrostomy w/ ileal conduit (in remission)  -urology consulted, f.u recs  -monitor UO from nephrostomy bag    GI  #npo   not tolerating tube feeds, vomited, s/p switch to sump and npo    #constipation  hx of sbo, no sbo on ct abd, belly benign on exam, now s/p 2 BMs am 6/19 and 1 6/20  -bowel regimen if needed    Heme  anemia, normocytic, likely 2/2 hugo on ckd and acd, cbc stable no s./s of bleed  -monitor CBC    OTHER  F: none  E: replete PRN but cautious in [place] in CKD  N: npo   DVT ppx: HSQ 5000 q8, scds  other ppx: ppi  DNR, not DNI for now  dispo: MICU 68 y/o M PMH smoker (50 years and current), sleep apnea w/o cpap, short-term mem loss, HTN, DM2, chronic back pain s/p multiple back surgeries w/ hardware on methadone, anxiety, SBO s/p sx c/b wound that never healed, prostate cancer 1999 s/p radiation c/b radical cystectomy and nephrostomy w/ ileal conduit (in remission), p/w SOB, found to have severe acidemia, sepsis 2/2 pyelonephritis vs PNA, new onset suspected lung cancer w/ mets, admitted to MICU.     Neurology  #TME  agitation/ anxiety hx/ short term mem loss hx, likely 2/2 uremia vs sepsis vs baseline per partner when has short term mem loss and gets confused and agitated,  -became agitated and tachycardic off sedation. re-sedated w propofol at this time.   -c/w Seroquel 50 bid, monitor qtc    Cardiac  #mild aortic aneurysm  dilation of the aortic root and ascending aorta on ct, no known hx, no AR on echo  -control sBP <150     #hypotension  likely 2/2 sedation and sepsis, lactate improved to 2.0, increasing levo requirements  -c/w levo gtt    #htn  -currently on pressors - holding home anti-hypertensive medications    #fluid overload  pt grossly overloaded on CXR. s/p  lasix 120IV and 10mg metolazone w/ imrpvoement in cxr and UO that has since platueaud, pt not improving in CXR after several days of net negative  - monitor I/Os    Pulmonary  #lung cancer w/ mets  CT chest/ abd w/o contrast due to cr 4.5 showing 7cm RLL mass concerning for neoplasm w/ suspected hepatic and adrenal mets and abd LAD on ct, no known hx of lung cancer per partner, never been treated as pt was unaware, chronic smoker but never screened with CT per partner, dopplers neg for dvt, bronchoscopy 6/17 showing RML mucous plugging, sent for sputum cx and cytology, GOC per partner- living will no definitive decisions but leaves all GOC decisions to partner, pal care consulted, pt DNR, -suspect PNA is post-obstructive pna 2/2 mass. bronchoscopy negative for malignant cells- HCP discussed with IR and agreed risk too high for biopsy, poor pgx, hcp aware and thinking about making pt comfort care but still deciding/ processing  -pal care consulted, meeting again today for [facility] (per partner will likely palliatively extubate today)  -oncology consulted    #smoker   50 years and current, no recent ct scans  -nicotine patch prn when pt not sedated    #sleep apnea  offered cpap at home but did not use  -currently intubated  -cpap at night when off mech vent    #COPD  no dx, no inhalers, emphysema on CT scan of chest  -duonebs q6 standing     #post obstructive PNA 2/2 suspected lung cancer  s/p 10 days of tito, continues to spike fevers (no increase in fever curve)  -cannot obtain source control given comorbidities not a candidate for surgery  -consider repeat ct chest if in line with [facility]    Renal  #HUGO on CKD  Cr 4.5 on admission, baseline cr 1.6 2/19 per PMD, cr improved and pt continues to make urine  -f/u renal recs  -monitor bmp  -possible HD in near future  -monitor UO   -renally dose medications    Metabolic  #metabolic acidosis   bicarb low, no AG, no diarrhea, likely 2/2 hyperchloremia from NS, improving  -monitor bmp  -renal consult as above  -keep meds in d5 not ns  -na bicarb 1300 tid    ID  # sepsis 2/2 post-obstructive PNA  on admission wbc 30, w/ tachypnea to 22 meets 2/4 sirs criteria afebrile, lactate 2.6. cxr clear, ct chest RLL PNA and under mass. ct scan showing perinephritic stranding and mod hydronephrosis concerning for UTI/ nephritis UA+, but urince cx neg. pt w/ distended gallbladder w/ stones concerning for cholecystitis but  HIDA scan neg, blood and sputum and urine cultures no growth, wound care for skin wound over abdomen (doesnt look infected, from SBO s/p sx c/b wound that never healed, ) penile dc cx grows e faecalis sensitive to amp/vanc. pt with no culture data other than e fecalis in penile cx, covered by meropenam (alos covering for empiric PNA and pyelo), lactate nomrlaized to 2, WBC still elevated  and pt still febrile, likely has post obstructive PNA without source control so continues to spike and have wbc, wbc has high eos suggestive 2/2 cancer  -as above under pulm    Endocrine  #DM2  a1c 7.4, on home metformin, fsgs controlled  -ISS  -add insulin regimen if needed    MSK  #chronic back pain  chronic back pain s/p multiple back surgeries w/ hardware on methadone, utox  positive for methadone only which per partner pt gest from pain specialist Dr Kwan, takes  5mg 2-4 pills qd   -methadone 5-20 mg qd, higher end if needed for pain control when off sedation      #cystectomy with nephrostomy  ct showing mod hydro but draining dark brown urine, R kidney atrophy, prostate cancer 1999 s/p radiation c/b radical cystectomy and nephrostomy w/ ileal conduit (in remission)  -urology consulted, f.u recs  -monitor UO from nephrostomy bag    GI  #npo   not tolerating tube feeds, vomited, s/p switch to sump and npo    #constipation  hx of sbo, no sbo on ct abd, belly benign on exam, now s/p 2 BMs am 6/19 and 1 6/20  -bowel regimen if needed    Heme  anemia, normocytic, likely 2/2 hugo on ckd and acd, cbc stable no s./s of bleed  -monitor CBC    OTHER  F: none  E: replete PRN but cautious in [place] in CKD  N: npo   DVT ppx: HSQ 5000 q8, scds  other ppx: ppi  DNR, not DNI for now  dispo: MICU

## 2019-06-26 NOTE — PROGRESS NOTE ADULT - ATTENDING COMMENTS
Palliative care and Dr. Loving have discussed situation with patient's HCP. Plan now is for comfort care with palliative extubation today.

## 2019-06-26 NOTE — CHART NOTE - NSCHARTNOTEFT_GEN_A_CORE
Goals of care discussion held once again with Vinod Bo (HCP) via phone. As ascertained in discussions previously held between myself, palliative staff, oncology, and HCP, the goal for Mr. Villafuerte's care will be centered around his comfort. After re-explaining Mr. Villafuerte's lack of response to our current aggressive medical therapy and overall poor prognosis, decision was made by HCP to switch to comfort care. He will be extubated this afternoon after weaning his sedation and administering opioids to treat his respiratory distress and pain.  Betzy stated that he wishes not to be present during the extubation, but wishes to be contacted for any other changes. We will keep him updated accordingly. Goals of care discussion held once again with Vinod Bo (HCP) via phone today at 12:45PM. As ascertained in discussions previously held between myself, palliative staff, oncology, and HCP, the goal for Mr. Villafuerte's care will be centered around his comfort. After re-explaining Mr. Villafuerte's lack of response to our current aggressive medical therapy and overall poor prognosis, decision was made by HCP to switch to comfort care. He will be extubated this afternoon after weaning his sedation and administering opioids to treat his respiratory distress and pain. Mr. Bo stated that he wishes not to be present during the extubation, but wishes to be contacted for any other changes. We will keep him updated accordingly.

## 2019-06-27 NOTE — DISCHARGE NOTE FOR THE EXPIRED PATIENT - SECONDARY DIAGNOSIS.
Acute pyelonephritis HAP (hospital-acquired pneumonia) Primary malignant neoplasm of lung metastatic to other site, unspecified laterality

## 2019-06-27 NOTE — DISCHARGE NOTE FOR THE EXPIRED PATIENT - HOSPITAL COURSE
70 y/o M PMH smoker (50 years and current), sleep apnea w/o cpap, short-term mem loss, HTN, DM2, chronic back pain s/p multiple back surgeries w/ hardware on methadone, anxiety, SBO s/p sx c/b wound that never healed, prostate cancer 1999 s/p radiation c/b radical cystectomy and nephrostomy w/ ileal conduit (in remission), p/w SOB, found to have HUGO, metabolic acidosis, RLL lung cancer w/ mets, and overlaying infection. S/p vanc zosyn for broad coverage of infection, suspected source RLL post-obstructive PNA, pt switched to tito s/p 10 day course w/o source control so continued to have fevers and WBC elevation. HIDA scan neg, UCx neg, penil cx growing e fecalis. S/p intubation for airway protection for not tolerating secretions and agitation. HUGO improved w/o HD, acidosis improved with bicarb. CT/ abd chest showing  RLL mass suspicious for lung cancer, with mets to liver and adrenals. IR discussed biopsy with HCP and HCP decided risks were not worth potential benefits. Pal care consulted as pt with poor prognosis worsened by multiple comorbidities Pt poor candidate for surgery or chemo therapy. GOC discussion had between primary team, pal care, and HCP and it decided pt to be comfort care w/ palliative extubation planned after pt comfortable on morphine drip.     Was called by RN to assess patient for asystole. On exam patient had no spontaneous movements. The patient was unresponsive to verbal or tactile stimuli. Pupils were mid-dilated and fixed. No breath sounds were appreciated over either lung field. No radial pulses were palpable. No heart sounds were auscultated over entire precordium. Patient pronounced dead at 00:45 on 6/27/2019. Dr. Gonzales, night intensivist, and HCP and partner Vinod Bo notified.  Patient was DNR/DNI. Patient’s major medical illness was cardiopulmonary arres secondary to septic shock. Time of death at 00:45, confirmed and witnessed by Dr. Celsa Castaneda MD PGY2.

## 2019-06-29 LAB
CULTURE RESULTS: SIGNIFICANT CHANGE UP
SPECIMEN SOURCE: SIGNIFICANT CHANGE UP

## 2019-07-17 LAB
CULTURE RESULTS: SIGNIFICANT CHANGE UP
SPECIMEN SOURCE: SIGNIFICANT CHANGE UP

## 2019-07-18 NOTE — CHART NOTE - NSCHARTNOTEFT_GEN_A_CORE
Addendum for Bronchoscopy report on 6/17/19    Operative findings: Sharp fariba. Airways were examined bilaterally to subsegmental level. Left and right airways both appeared normal without any erythema, swelling, or obstruction, or bleeding. Mild amounts of mucus was therapeutically aspirated. Scope was withdrawn afterwards and patient tolerated procedure well.    Specimen sent: Bronchial wash    Estimated blood loss: 0cc

## 2019-08-07 LAB
CULTURE RESULTS: SIGNIFICANT CHANGE UP
SPECIMEN SOURCE: SIGNIFICANT CHANGE UP

## 2020-01-02 NOTE — ED ADULT NURSE NOTE - NS ED NOTE ABUSE RESPONSE YN
[] : Yes [No] : In the past 12 months have you used drugs other than those required for medical reasons? No [No falls in past year] : Patient reported no falls in the past year [0] : 1) Little interest or pleasure doing things: Not at all (0) [de-identified] : 1/2--1 PPD [Audit-CScore] : 0 [[id number]] : 0 Yes

## 2020-08-18 NOTE — ED PROVIDER NOTE - CONSULTING PHYSICIAN
Called multiple times and lmom to return call 
Can you call patient
Dr. Courtney (PCP at [facility])

## 2020-09-10 NOTE — DISCHARGE NOTE FOR THE EXPIRED PATIENT - NS EXPIRED ORGANCONFIRMRES
Critical Care Consultation    Date of consult: 9/9/2020    Referring Physician  Enrrique Russell D.O.    Reason for Consultation  Acute resp failure and PN    History of Presenting Illness  55 y.o. male w/ PMH of afib who presented 9/9/2020 with acute resp failure and PNA as a tx from [facility]    He was covid negative at the outside facility.  Concern was for multi lobar pneumonia requiring mechanical ventilation and norepinephrine for presumed septic shock.    9/10 brief respiratory arrest overnight, currently not requiring high ventilator settings.  Stop continuous albuterol, stop vancomycin, with lighten sedation he is awake and alert follows commands    Reviewed last 24 hour events:              - Overnight events: respiratory arrest              - Hemodynamics: HDS on [place]              - Neuro/Pain/Sedation: dex + fentanyl   - Resp: 8/40%              - GI: trickle TF  - Infectious Disease: zosyn           Code Status  Full Code    Review of Systems  Review of Systems   Unable to perform ROS: Intubated       Past Medical History   has a past medical history of Atrial fibrillation (HCC), GERD (gastroesophageal reflux disease), Hypertension, and Psychiatric problem.    Surgical History   has a past surgical history that includes other orthopedic surgery and other abdominal surgery.    Family History  family history includes Cancer in his father and mother.    Social History   reports that he has quit smoking. He smoked 0.00 packs per day. He has never used smokeless tobacco. He reports current alcohol use. He reports previous drug use.    Medications  Home Medications     Reviewed by Kiran Black R.N. (Registered Nurse) on 09/09/20 at 2031  Med List Status: Partial   Medication Last Dose Status   amiodarone (CORDARONE) 200 MG Tab  Active   ascorbic acid (ASCORBIC ACID) 500 MG Tab  Active   calcitRIOL (ROCALTROL) 0.5 MCG Cap  Active   DULoxetine (CYMBALTA) 60 MG Cap DR Particles delayed-release  capsule  Active   esomeprazole (NEXIUM) 20 MG capsule  Active   ferrous sulfate 325 (65 Fe) MG tablet  Active   folic acid (FOLVITE) 1 MG Tab  Active   furosemide (LASIX) 40 MG Tab  Active   metoprolol (LOPRESSOR) 25 MG Tab  Active   potassium chloride (KLOR-CON) 20 MEQ Pack  Active   rivaroxaban (XARELTO) 20 MG Tab tablet  Active   tamsulosin (FLOMAX) 0.4 MG capsule  Active   topiramate (TOPAMAX) 25 MG Tab  Active   vitamin D (CHOLECALCIFEROL) 1000 UNIT Tab  Active              Current Facility-Administered Medications   Medication Dose Route Frequency Provider Last Rate Last Dose   • phenylephrine (LISANDRO-SYNEPHRINE) 40 mg in  mL Infusion  0-300 mcg/min Intravenous Continuous Francisco Duval M.D. 37.5 mL/hr at 09/10/20 1731 100 mcg/min at 09/10/20 1731   • ipratropium-albuterol (DUONEB) nebulizer solution  3 mL Nebulization Q6HRS (RT) Francisco Duval M.D.   Stopped at 09/10/20 1400   • dexmedetomidine (PRECEDEX) 400 mcg/100mL NS premix infusion  0.1-1.5 mcg/kg/hr Intravenous Continuous Francisco Duval M.D. 13.2 mL/hr at 09/10/20 1718 0.7 mcg/kg/hr at 09/10/20 1718   • Pharmacy Consult: Enteral tube insertion - review meds/change route/product selection  1 Each Other PHARMACY TO DOSE Francisco Duval M.D.       • promethazine (PHENERGAN) tablet 12.5-25 mg  12.5-25 mg Enteral Tube Q4HRS PRN Francisco Duval M.D.       • ondansetron (ZOFRAN ODT) dispertab 4 mg  4 mg Enteral Tube Q4HRS PRN Francisco Duval M.D.       • acetaminophen (TYLENOL) tablet 650 mg  650 mg Enteral Tube Q6HRS PRN Francisco Duval M.D.       • norepinephrine (Levophed) infusion 8 mg/250 mL (premix)  0-30 mcg/min Intravenous Continuous Arcadio Bangura M.D.   Stopped at 09/10/20 0832   • ondansetron (ZOFRAN) syringe/vial injection 4 mg  4 mg Intravenous Q4HRS PRN Arcadio Bangura M.D.       • promethazine (PHENERGAN) suppository 12.5-25 mg  12.5-25 mg Rectal Q4HRS PRN Arcadio Bangura M.D.       • prochlorperazine (COMPAZINE) injection  5-10 mg  5-10 mg Intravenous Q4HRS PRN Arcadio Bangura M.D.       • piperacillin-tazobactam (ZOSYN) 3.375 g in  mL IVPB  3.375 g Intravenous Q8HRS Arcadio Bangura M.D.   Stopped at 09/10/20 1706   • Respiratory Therapy Consult   Nebulization Continuous RT Arcadio Bangura M.D.       • famotidine (PEPCID) tablet 20 mg  20 mg Enteral Tube Q12HRS Arcadio Bangura M.D.        Or   • famotidine (PEPCID) injection 20 mg  20 mg Intravenous Q12HRS Arcadio Bangura M.D.   20 mg at 09/10/20 1706   • senna-docusate (PERICOLACE or SENOKOT S) 8.6-50 MG per tablet 2 Tab  2 Tab Enteral Tube BID Arcadio Bangura M.D.   Stopped at 09/10/20 0600    And   • polyethylene glycol/lytes (MIRALAX) PACKET 1 Packet  1 Packet Enteral Tube QDAY PRN Arcadio Bangura M.D.        And   • magnesium hydroxide (MILK OF MAGNESIA) suspension 30 mL  30 mL Enteral Tube QDAY PRN Arcadio Bangura M.D.        And   • bisacodyl (DULCOLAX) suppository 10 mg  10 mg Rectal QDAY PRN Arcadio Bangura M.D.       • MD Alert...ICU Electrolyte Replacement per Pharmacy   Other PHARMACY TO DOSE Arcadio Bangura M.D.       • lidocaine (XYLOCAINE) 1 % injection 1-2 mL  1-2 mL Tracheal Tube Q30 MIN PRN Arcadio Bangura M.D.       • fentaNYL (SUBLIMAZE) injection 50 mcg  50 mcg Intravenous Q15 MIN PRN Arcadio Bangura M.D.        And   • fentaNYL (SUBLIMAZE) injection 100 mcg  100 mcg Intravenous Q15 MIN PRN Arcadio Bangura M.D.   100 mcg at 09/10/20 1500    And   • fentaNYL (SUBLIMAZE) 50 mcg/mL in 50mL (Continuous Infusion)   Intravenous Continuous Arcadio Bangura M.D. 4 mL/hr at 09/10/20 1718 200 mcg/hr at 09/10/20 1718   • amiodarone (CORDARONE) tablet 200 mg  200 mg Enteral Tube Q DAY Arcadio Bangura M.D.   Stopped at 09/10/20 0600       Allergies  Allergies   Allergen Reactions   • Iron Dextran    • Keflex    • Penicillins      Tolerated zosyn at outside hospital 9/2020       Vital Signs last 24 hours  Temp:  [36 °C (96.8 °F)-36.6 °C (97.8 °F)] 36.1 °C (97 °F)  Pulse:   [0-132] 70  Resp:  [0-27] 20  BP: ()/(15-93) 81/46  SpO2:  [92 %-100 %] 97 %    Physical Exam  Physical Exam  Constitutional:       Appearance: He is ill-appearing. He is not toxic-appearing.   HENT:      Head: Normocephalic and atraumatic.      Mouth/Throat:      Mouth: Mucous membranes are moist.   Eyes:      Extraocular Movements: Extraocular movements intact.      Pupils: Pupils are equal, round, and reactive to light.   Cardiovascular:      Rate and Rhythm: Normal rate and regular rhythm.   Pulmonary:      Breath sounds: Normal breath sounds.      Comments: Mechanical ventilation  Abdominal:      General: Abdomen is flat. There is no distension.      Tenderness: There is no abdominal tenderness. There is no guarding or rebound.   Musculoskeletal:         General: No swelling or tenderness.   Skin:     General: Skin is warm and dry.      Capillary Refill: Capillary refill takes less than 2 seconds.   Neurological:      Mental Status: He is alert.      Comments: Somnolent but moves all four ext, follows commands         Fluids    Intake/Output Summary (Last 24 hours) at 9/10/2020 1737  Last data filed at 9/10/2020 1200  Gross per 24 hour   Intake 1491.56 ml   Output 1755 ml   Net -263.44 ml       Laboratory  Recent Results (from the past 48 hour(s))   MRSA By PCR (Amp)    Collection Time: 09/09/20  2:25 AM    Specimen: Nares; Respirate   Result Value Ref Range    Significant Indicator NEG     Source RESP     Site NARES     MRSA PCR Negative for MRSA by PCR.    ISTAT ARTERIAL BLOOD GAS    Collection Time: 09/09/20  8:37 PM   Result Value Ref Range    Ph 7.364 (L) 7.400 - 7.500    Pco2 54.9 (HH) 26.0 - 37.0 mmHg    Po2 58 (L) 64 - 87 mmHg    Tco2 33 20 - 33 mmol/L    S02 88 (L) 93 - 99 %    Hco3 31.3 (H) 17.0 - 25.0 mmol/L    BE 5 (H) -4 - 3 mmol/L    Body Temp 97.8 F degrees    O2 Therapy 40 %    iPF Ratio 145     Ph Temp Chris 7.371 (L) 7.400 - 7.500    Pco2 Temp Co 53.9 (HH) 26.0 - 37.0 mmHg    Po2 Temp Cor  56 (L) 64 - 87 mmHg    Specimen Arterial     Action Range Triggered YES     Inst. Qualified Patient YES    Triglycerides Starting now and then Every 3 Days    Collection Time: 09/09/20  8:50 PM   Result Value Ref Range    Triglycerides 50 0 - 149 mg/dL   CBC WITH DIFFERENTIAL    Collection Time: 09/09/20  8:50 PM   Result Value Ref Range    WBC 18.6 (H) 4.8 - 10.8 K/uL    RBC 2.74 (L) 4.70 - 6.10 M/uL    Hemoglobin 8.1 (L) 14.0 - 18.0 g/dL    Hematocrit 27.0 (L) 42.0 - 52.0 %    MCV 98.5 (H) 81.4 - 97.8 fL    MCH 29.6 27.0 - 33.0 pg    MCHC 30.0 (L) 33.7 - 35.3 g/dL    RDW 47.6 35.9 - 50.0 fL    Platelet Count 199 164 - 446 K/uL    MPV 10.0 9.0 - 12.9 fL    Neutrophils-Polys 89.80 (H) 44.00 - 72.00 %    Lymphocytes 1.80 (L) 22.00 - 41.00 %    Monocytes 7.70 0.00 - 13.40 %    Eosinophils 0.00 0.00 - 6.90 %    Basophils 0.20 0.00 - 1.80 %    Immature Granulocytes 0.50 0.00 - 0.90 %    Nucleated RBC 0.00 /100 WBC    Neutrophils (Absolute) 16.71 (H) 1.82 - 7.42 K/uL    Lymphs (Absolute) 0.33 (L) 1.00 - 4.80 K/uL    Monos (Absolute) 1.44 (H) 0.00 - 0.85 K/uL    Eos (Absolute) 0.00 0.00 - 0.51 K/uL    Baso (Absolute) 0.03 0.00 - 0.12 K/uL    Immature Granulocytes (abs) 0.09 0.00 - 0.11 K/uL    NRBC (Absolute) 0.00 K/uL   COMP METABOLIC PANEL    Collection Time: 09/09/20  8:50 PM   Result Value Ref Range    Sodium 141 135 - 145 mmol/L    Potassium 3.4 (L) 3.6 - 5.5 mmol/L    Chloride 100 96 - 112 mmol/L    Co2 30 20 - 33 mmol/L    Anion Gap 11.0 7.0 - 16.0    Glucose 106 (H) 65 - 99 mg/dL    Bun 17 8 - 22 mg/dL    Creatinine 0.38 (L) 0.50 - 1.40 mg/dL    Calcium 7.6 (L) 8.5 - 10.5 mg/dL    AST(SGOT) 7 (L) 12 - 45 U/L    ALT(SGPT) 14 2 - 50 U/L    Alkaline Phosphatase 49 30 - 99 U/L    Total Bilirubin 0.4 0.1 - 1.5 mg/dL    Albumin 3.2 3.2 - 4.9 g/dL    Total Protein 5.1 (L) 6.0 - 8.2 g/dL    Globulin 1.9 1.9 - 3.5 g/dL    A-G Ratio 1.7 g/dL   PROTHROMBIN TIME    Collection Time: 09/09/20  8:50 PM   Result Value Ref Range     PT 18.6 (H) 12.0 - 14.6 sec    INR 1.50 (H) 0.87 - 1.13   APTT    Collection Time: 09/09/20  8:50 PM   Result Value Ref Range    APTT 35.5 24.7 - 36.0 sec   TROPONIN    Collection Time: 09/09/20  8:50 PM   Result Value Ref Range    Troponin T 12 6 - 19 ng/L   ESTIMATED GFR    Collection Time: 09/09/20  8:50 PM   Result Value Ref Range    GFR If African American >60 >60 mL/min/1.73 m 2    GFR If Non African American >60 >60 mL/min/1.73 m 2   Lactic acid    Collection Time: 09/09/20 10:05 PM   Result Value Ref Range    Lactic Acid 0.7 0.5 - 2.0 mmol/L   Blood Culture,Hold    Collection Time: 09/09/20 10:05 PM   Result Value Ref Range    Blood Culture Hold Collected    Blood Culture,Hold    Collection Time: 09/09/20 10:05 PM   Result Value Ref Range    Blood Culture Hold Collected    ISTAT ARTERIAL BLOOD GAS    Collection Time: 09/09/20 11:49 PM   Result Value Ref Range    Ph 7.372 (L) 7.400 - 7.500    Pco2 53.8 (HH) 26.0 - 37.0 mmHg    Po2 54 (L) 64 - 87 mmHg    Tco2 33 20 - 33 mmol/L    S02 86 (L) 93 - 99 %    Hco3 31.2 (H) 17.0 - 25.0 mmol/L    BE 5 (H) -4 - 3 mmol/L    Body Temp 97.4 F degrees    O2 Therapy 40 %    iPF Ratio 135     Ph Temp Chris 7.382 (L) 7.400 - 7.500    Pco2 Temp Co 52.2 (HH) 26.0 - 37.0 mmHg    Po2 Temp Cor 51 (L) 64 - 87 mmHg    Specimen Arterial     Action Range Triggered YES     Inst. Qualified Patient YES    Fungal Culture - BAL    Collection Time: 09/10/20 12:45 AM    Specimen: Bronchoalveolar Lavage; Respirate   Result Value Ref Range    Significant Indicator NEG     Source RESP     Site Bronchoalveolar Lavage LLL     Culture Result Culture in progress.    Fungal Smear - BAL    Collection Time: 09/10/20 12:45 AM    Specimen: Bronchoalveolar Lavage; Respirate   Result Value Ref Range    Significant Indicator NEG     Source RESP     Site Bronchoalveolar Lavage LLL     Fungal Smear Results No fungal elements seen.    GRAM STAIN    Collection Time: 09/10/20 12:45 AM    Specimen: Respirate  Not Applicable   Result Value Ref Range    Significant Indicator .     Source RESP     Site Bronchoalveolar Lavage LLL     Gram Stain Result       Many WBCs.  Rare columnar epithelial cells.  Rare epithelial cells.  No organisms seen.     ISTAT ARTERIAL BLOOD GAS    Collection Time: 09/10/20  3:08 AM   Result Value Ref Range    Ph 7.433 7.400 - 7.500    Pco2 40.6 (H) 26.0 - 37.0 mmHg    Po2 67 64 - 87 mmHg    Tco2 28 20 - 33 mmol/L    S02 93 93 - 99 %    Hco3 27.1 (H) 17.0 - 25.0 mmol/L    BE 3 -4 - 3 mmol/L    Body Temp 97.1 F degrees    O2 Therapy 40 %    iPF Ratio 168     Ph Temp Chris 7.446 7.400 - 7.500    Pco2 Temp Co 39.1 (H) 26.0 - 37.0 mmHg    Po2 Temp Cor 63 (L) 64 - 87 mmHg    Specimen Arterial     Action Range Triggered NO     Inst. Qualified Patient YES    CBC with Differential    Collection Time: 09/10/20  5:05 AM   Result Value Ref Range    WBC 22.0 (H) 4.8 - 10.8 K/uL    RBC 2.54 (L) 4.70 - 6.10 M/uL    Hemoglobin 7.7 (L) 14.0 - 18.0 g/dL    Hematocrit 25.0 (L) 42.0 - 52.0 %    MCV 98.4 (H) 81.4 - 97.8 fL    MCH 30.3 27.0 - 33.0 pg    MCHC 30.8 (L) 33.7 - 35.3 g/dL    RDW 48.2 35.9 - 50.0 fL    Platelet Count 207 164 - 446 K/uL    MPV 10.0 9.0 - 12.9 fL    Neutrophils-Polys 90.00 (H) 44.00 - 72.00 %    Lymphocytes 3.30 (L) 22.00 - 41.00 %    Monocytes 6.00 0.00 - 13.40 %    Eosinophils 0.00 0.00 - 6.90 %    Basophils 0.20 0.00 - 1.80 %    Immature Granulocytes 0.50 0.00 - 0.90 %    Nucleated RBC 0.00 /100 WBC    Neutrophils (Absolute) 19.83 (H) 1.82 - 7.42 K/uL    Lymphs (Absolute) 0.73 (L) 1.00 - 4.80 K/uL    Monos (Absolute) 1.33 (H) 0.00 - 0.85 K/uL    Eos (Absolute) 0.00 0.00 - 0.51 K/uL    Baso (Absolute) 0.04 0.00 - 0.12 K/uL    Immature Granulocytes (abs) 0.11 0.00 - 0.11 K/uL    NRBC (Absolute) 0.00 K/uL   Basic Metabolic Panel (BMP)    Collection Time: 09/10/20  5:05 AM   Result Value Ref Range    Sodium 140 135 - 145 mmol/L    Potassium 2.9 (L) 3.6 - 5.5 mmol/L    Chloride 100 96 - 112 mmol/L    Co2 29 20  - 33 mmol/L    Glucose 145 (H) 65 - 99 mg/dL    Bun 17 8 - 22 mg/dL    Creatinine 0.48 (L) 0.50 - 1.40 mg/dL    Calcium 7.9 (L) 8.5 - 10.5 mg/dL    Anion Gap 11.0 7.0 - 16.0   Magnesium    Collection Time: 09/10/20  5:05 AM   Result Value Ref Range    Magnesium 1.5 1.5 - 2.5 mg/dL   Phosphorus    Collection Time: 09/10/20  5:05 AM   Result Value Ref Range    Phosphorus 1.9 (L) 2.5 - 4.5 mg/dL   ESTIMATED GFR    Collection Time: 09/10/20  5:05 AM   Result Value Ref Range    GFR If African American >60 >60 mL/min/1.73 m 2    GFR If Non African American >60 >60 mL/min/1.73 m 2   Infectious Disease Testing (Exposure)    Collection Time: 09/10/20  7:39 AM   Result Value Ref Range    Hepatitis B Surface Antigen Non-Reactive Non-Reactive    Hepatitis C Antibody Non-Reactive Non-Reactive   HIV Rapid Screen (Exposure)    Collection Time: 09/10/20  7:39 AM   Result Value Ref Range    HIV Ag/Ab Combo Assay Non-Reactive Non Reactive   HEMOGLOBIN AND HEMATOCRIT    Collection Time: 09/10/20 10:14 AM   Result Value Ref Range    Hemoglobin 7.3 (L) 14.0 - 18.0 g/dL    Hematocrit 24.4 (L) 42.0 - 52.0 %   PROCALCITONIN    Collection Time: 09/10/20 11:32 AM   Result Value Ref Range    Procalcitonin <0.05 <0.25 ng/mL   EC-ECHOCARDIOGRAM COMPLETE W/O CONT    Collection Time: 09/10/20 12:39 PM   Result Value Ref Range    Eject.Frac. MOD BP 65.37     Eject.Frac. MOD 4C 61.62     Eject.Frac. MOD 2C 68     Left Ventrical Ejection Fraction 65        Imaging  EC-ECHOCARDIOGRAM COMPLETE W/O CONT   Final Result      OUTSIDE IMAGES-DX CHEST   Final Result      US-FOREIGN FILM ULTRASOUND   Final Result      OUTSIDE IMAGES-DX CHEST   Final Result      OUTSIDE IMAGES-US ABDOMEN   Final Result      OUTSIDE IMAGES-CT CHEST   Final Result      DX-CHEST-PORTABLE (1 VIEW)   Final Result         1.  Pulmonary edema and/or infiltrates are identified, which are stable since the prior exam.   2.  Trace bilateral pleural effusion   3.  Cardiomegaly       DX-CHEST-LIMITED (1 VIEW)   Final Result         1.  Pulmonary edema and/or infiltrates.   2.  Cardiomegaly      DX-CHEST-LIMITED (1 VIEW)   Final Result         1.  Pulmonary edema and/or infiltrates are identified, which are stable since the prior exam.   2.  Small bilateral pleural effusions   3.  Cardiomegaly      DX-ABDOMEN FOR TUBE PLACEMENT    (Results Pending)       Assessment/Plan  Sepsis (HCC)  Assessment & Plan  This is Severe Sepsis Present on admission  SIRS criteria identified on my evaluation include: Tachycardia, with heart rate greater than 90 BPM and Leukocytosis, with WBC greater than 12,000  Source of infection is respiratroy  Clinical indicators of end organ dysfunction include Acute respiratory failure as evidenced by a new need for invasive or non-invasive mechanical ventilation (Ventilator or BiPap)   Sepsis protocol initiated  Fluid resuscitation ordered per protocol  IV antibiotics as appropriate for source of sepsis  Reassessment: I have reassessed the patient's hemodynamic status  End organ dysfunction include(s):  Acute respiratory failure     Goal MAP > 65; CVC placed at beside by ERP; Levophed started  Judicious fluids given hx of HF  Empiric abx; deescalate as able      Pneumonia  Assessment & Plan  Empiric abx with Zosyn and Vanc for now  Unsure if he has been recently hospitalized last admission at [facility] was in 12/19  MRSA swab negative   Await bronchoscopy results from last night    Acute respiratory failure (HCC)  Assessment & Plan  Multi lobar pneumonia versus pulmonary edema  RT/O2 protocols  Titration of ventilator therapy based on ABGs, waveform analysis and patient's status  SAT/SBT when able (ABCDEF Bundle)       Pulmonary hypertension (HCC)- (present on admission)  Assessment & Plan  Goal euvolemia when MAP able to tolerate    Paroxysmal atrial fibrillation (HCC)- (present on admission)  Assessment & Plan  Some report of hemoptysis at OSH; no signs of active hemorrhage at  present  Will hold Xarelto for now  Continue amiodarone through enteral tube  Hold BB for now given hypotension, resume when appropriate    CHF (congestive heart failure) (HCC)- (present on admission)  Assessment & Plan  RHF per report  Hold home regimen of beta blockade and Lasix given hypotension  Bedside echo pending    Lung protective ventilation strategies  Optimize oxygenation, ventilation, and acid base balance.      Discussed patient condition and risk of morbidity and/or mortality with RN, RT, Pharmacy and Charge nurse / hot rounds.    The patient remains critically ill.  Critical care time = 36 minutes in directly providing and coordinating critical care and extensive data review.  No time overlap and excludes procedures.

## 2022-08-30 NOTE — PROCEDURAL SAFETY CHECKLIST WITH OR WITHOUT SEDATION - NSPRESEDATIONFT_GEN_ALL_CORE
Physician confirms case reviewed for anesthesia consultation requirements. Topical Retinoid counseling:  Patient advised to apply a pea-sized amount only at bedtime and wait 30 minutes after washing their face before applying.  If too drying, patient may add a non-comedogenic moisturizer. The patient verbalized understanding of the proper use and possible adverse effects of retinoids.  All of the patient's questions and concerns were addressed.